# Patient Record
Sex: FEMALE | Race: WHITE | Employment: OTHER | URBAN - METROPOLITAN AREA
[De-identification: names, ages, dates, MRNs, and addresses within clinical notes are randomized per-mention and may not be internally consistent; named-entity substitution may affect disease eponyms.]

---

## 2017-01-25 ENCOUNTER — OFFICE VISIT (OUTPATIENT)
Dept: INTERNAL MEDICINE | Age: 67
End: 2017-01-25

## 2017-01-25 VITALS
SYSTOLIC BLOOD PRESSURE: 102 MMHG | HEART RATE: 64 BPM | TEMPERATURE: 98.1 F | HEIGHT: 65 IN | DIASTOLIC BLOOD PRESSURE: 62 MMHG | OXYGEN SATURATION: 97 % | BODY MASS INDEX: 22.99 KG/M2 | RESPIRATION RATE: 14 BRPM | WEIGHT: 138 LBS

## 2017-01-25 DIAGNOSIS — Z87.891 PERSONAL HISTORY OF TOBACCO USE: ICD-10-CM

## 2017-01-25 DIAGNOSIS — J44.9 CHRONIC OBSTRUCTIVE PULMONARY DISEASE, UNSPECIFIED COPD TYPE (HCC): ICD-10-CM

## 2017-01-25 DIAGNOSIS — E78.2 MIXED HYPERLIPIDEMIA: ICD-10-CM

## 2017-01-25 DIAGNOSIS — Z01.419 ENCOUNTER FOR GYNECOLOGICAL EXAMINATION WITHOUT ABNORMAL FINDING: ICD-10-CM

## 2017-01-25 DIAGNOSIS — M81.0 SENILE OSTEOPOROSIS: Primary | ICD-10-CM

## 2017-01-25 DIAGNOSIS — E03.4 HYPOTHYROIDISM DUE TO ACQUIRED ATROPHY OF THYROID: ICD-10-CM

## 2017-01-25 DIAGNOSIS — Z12.31 SCREENING MAMMOGRAM, ENCOUNTER FOR: ICD-10-CM

## 2017-01-25 PROCEDURE — 99215 OFFICE O/P EST HI 40 MIN: CPT | Performed by: FAMILY MEDICINE

## 2017-01-25 PROCEDURE — G0296 VISIT TO DETERM LDCT ELIG: HCPCS | Performed by: FAMILY MEDICINE

## 2017-01-25 RX ORDER — CITALOPRAM 40 MG/1
40 TABLET ORAL DAILY
Qty: 90 TABLET | Refills: 3 | Status: SHIPPED | OUTPATIENT
Start: 2017-01-25 | End: 2018-02-08 | Stop reason: SDUPTHER

## 2017-01-25 RX ORDER — LORATADINE 10 MG/1
10 TABLET ORAL DAILY
COMMUNITY
End: 2019-09-16 | Stop reason: ALTCHOICE

## 2017-01-25 ASSESSMENT — PATIENT HEALTH QUESTIONNAIRE - PHQ9
1. LITTLE INTEREST OR PLEASURE IN DOING THINGS: 0
2. FEELING DOWN, DEPRESSED OR HOPELESS: 0
SUM OF ALL RESPONSES TO PHQ9 QUESTIONS 1 & 2: 0
SUM OF ALL RESPONSES TO PHQ QUESTIONS 1-9: 0

## 2017-02-13 ENCOUNTER — HOSPITAL ENCOUNTER (OUTPATIENT)
Dept: WOMENS IMAGING | Age: 67
Discharge: HOME | End: 2017-02-13
Payer: MEDICARE

## 2017-02-13 DIAGNOSIS — M81.0 SENILE OSTEOPOROSIS: ICD-10-CM

## 2017-02-13 DIAGNOSIS — Z12.31 SCREENING MAMMOGRAM, ENCOUNTER FOR: ICD-10-CM

## 2017-02-13 PROCEDURE — 77080 DXA BONE DENSITY AXIAL: CPT

## 2017-02-13 PROCEDURE — G0202 SCR MAMMO BI INCL CAD: HCPCS

## 2017-02-13 RX ORDER — MOMETASONE FUROATE AND FORMOTEROL FUMARATE DIHYDRATE 200; 5 UG/1; UG/1
AEROSOL RESPIRATORY (INHALATION)
Qty: 13 G | Refills: 3 | Status: SHIPPED | OUTPATIENT
Start: 2017-02-13 | End: 2018-01-02 | Stop reason: SDUPTHER

## 2017-02-20 ENCOUNTER — TELEPHONE (OUTPATIENT)
Dept: INTERNAL MEDICINE | Age: 67
End: 2017-02-20

## 2017-02-20 RX ORDER — ALENDRONATE SODIUM 70 MG/1
70 TABLET ORAL
Qty: 12 TABLET | Refills: 3 | Status: SHIPPED | OUTPATIENT
Start: 2017-02-20 | End: 2017-10-17

## 2017-03-25 ENCOUNTER — HOSPITAL ENCOUNTER (EMERGENCY)
Age: 67
Discharge: HOME | End: 2017-03-25
Attending: EMERGENCY MEDICINE
Payer: MEDICARE

## 2017-03-25 ENCOUNTER — APPOINTMENT (OUTPATIENT)
Dept: CT IMAGING | Age: 67
End: 2017-03-25
Payer: MEDICARE

## 2017-03-25 VITALS
OXYGEN SATURATION: 100 % | DIASTOLIC BLOOD PRESSURE: 73 MMHG | WEIGHT: 136 LBS | BODY MASS INDEX: 22.66 KG/M2 | RESPIRATION RATE: 16 BRPM | HEIGHT: 65 IN | SYSTOLIC BLOOD PRESSURE: 133 MMHG | HEART RATE: 60 BPM | TEMPERATURE: 98.1 F

## 2017-03-25 DIAGNOSIS — K52.9 COLITIS: Primary | ICD-10-CM

## 2017-03-25 LAB
ALBUMIN SERPL-MCNC: 4.8 G/DL (ref 3.9–4.9)
ALP BLD-CCNC: 86 U/L (ref 40–130)
ALT SERPL-CCNC: 11 U/L (ref 0–33)
ANION GAP SERPL CALCULATED.3IONS-SCNC: 8 MEQ/L (ref 7–13)
AST SERPL-CCNC: 22 U/L (ref 0–35)
BASOPHILS ABSOLUTE: 0.1 K/UL (ref 0–0.2)
BASOPHILS RELATIVE PERCENT: 0.7 %
BILIRUB SERPL-MCNC: 0.4 MG/DL (ref 0–1.2)
BILIRUBIN URINE: NEGATIVE
BLOOD, URINE: NEGATIVE
BUN BLDV-MCNC: 13 MG/DL (ref 8–23)
CALCIUM SERPL-MCNC: 9.6 MG/DL (ref 8.6–10.2)
CHLORIDE BLD-SCNC: 99 MEQ/L (ref 98–107)
CLARITY: CLEAR
CO2: 25 MEQ/L (ref 22–29)
COLOR: YELLOW
CREAT SERPL-MCNC: 0.59 MG/DL (ref 0.5–0.9)
EOSINOPHILS ABSOLUTE: 0.1 K/UL (ref 0–0.7)
EOSINOPHILS RELATIVE PERCENT: 1.2 %
GFR AFRICAN AMERICAN: >60
GFR NON-AFRICAN AMERICAN: >60
GLOBULIN: 2 G/DL (ref 2.3–3.5)
GLUCOSE BLD-MCNC: 93 MG/DL (ref 74–109)
GLUCOSE URINE: NEGATIVE MG/DL
HCT VFR BLD CALC: 43.5 % (ref 37–47)
HEMOGLOBIN: 14.8 G/DL (ref 12–16)
KETONES, URINE: NEGATIVE MG/DL
LACTIC ACID: 0.9 MMOL/L (ref 0.5–2.2)
LEUKOCYTE ESTERASE, URINE: NEGATIVE
LYMPHOCYTES ABSOLUTE: 1.2 K/UL (ref 1–4.8)
LYMPHOCYTES RELATIVE PERCENT: 14.9 %
MCH RBC QN AUTO: 30.7 PG (ref 27–31.3)
MCHC RBC AUTO-ENTMCNC: 34.1 % (ref 33–37)
MCV RBC AUTO: 90.3 FL (ref 82–100)
MONOCYTES ABSOLUTE: 0.8 K/UL (ref 0.2–0.8)
MONOCYTES RELATIVE PERCENT: 10.1 %
NEUTROPHILS ABSOLUTE: 5.7 K/UL (ref 1.4–6.5)
NEUTROPHILS RELATIVE PERCENT: 73.1 %
NITRITE, URINE: NEGATIVE
PDW BLD-RTO: 13.6 % (ref 11.5–14.5)
PH UA: 6 (ref 5–9)
PLATELET # BLD: 159 K/UL (ref 130–400)
POTASSIUM SERPL-SCNC: 4 MEQ/L (ref 3.5–5.1)
PROTEIN UA: NEGATIVE MG/DL
RBC # BLD: 4.82 M/UL (ref 4.2–5.4)
SODIUM BLD-SCNC: 132 MEQ/L (ref 132–144)
SPECIFIC GRAVITY UA: 1 (ref 1–1.03)
TOTAL PROTEIN: 6.8 G/DL (ref 6.4–8.1)
UROBILINOGEN, URINE: 0.2 E.U./DL
WBC # BLD: 7.8 K/UL (ref 4.8–10.8)

## 2017-03-25 PROCEDURE — 80053 COMPREHEN METABOLIC PANEL: CPT

## 2017-03-25 PROCEDURE — 85025 COMPLETE CBC W/AUTO DIFF WBC: CPT

## 2017-03-25 PROCEDURE — 96374 THER/PROPH/DIAG INJ IV PUSH: CPT

## 2017-03-25 PROCEDURE — 81003 URINALYSIS AUTO W/O SCOPE: CPT

## 2017-03-25 PROCEDURE — 74177 CT ABD & PELVIS W/CONTRAST: CPT

## 2017-03-25 PROCEDURE — 2500000003 HC RX 250 WO HCPCS: Performed by: EMERGENCY MEDICINE

## 2017-03-25 PROCEDURE — 36415 COLL VENOUS BLD VENIPUNCTURE: CPT

## 2017-03-25 PROCEDURE — 96375 TX/PRO/DX INJ NEW DRUG ADDON: CPT

## 2017-03-25 PROCEDURE — 83605 ASSAY OF LACTIC ACID: CPT

## 2017-03-25 PROCEDURE — 99284 EMERGENCY DEPT VISIT MOD MDM: CPT

## 2017-03-25 PROCEDURE — 6360000004 HC RX CONTRAST MEDICATION: Performed by: RADIOLOGY

## 2017-03-25 PROCEDURE — 6360000002 HC RX W HCPCS: Performed by: EMERGENCY MEDICINE

## 2017-03-25 PROCEDURE — 2580000003 HC RX 258: Performed by: EMERGENCY MEDICINE

## 2017-03-25 RX ORDER — 0.9 % SODIUM CHLORIDE 0.9 %
1000 INTRAVENOUS SOLUTION INTRAVENOUS ONCE
Status: COMPLETED | OUTPATIENT
Start: 2017-03-25 | End: 2017-03-25

## 2017-03-25 RX ORDER — DILTIAZEM HYDROCHLORIDE 60 MG/1
60 TABLET, FILM COATED ORAL 2 TIMES DAILY
COMMUNITY
End: 2019-05-13 | Stop reason: SDUPTHER

## 2017-03-25 RX ORDER — CIPROFLOXACIN 500 MG/1
500 TABLET, FILM COATED ORAL 2 TIMES DAILY
Qty: 20 TABLET | Refills: 0 | Status: SHIPPED | OUTPATIENT
Start: 2017-03-25 | End: 2017-04-04

## 2017-03-25 RX ORDER — ONDANSETRON 4 MG/1
4-8 TABLET, ORALLY DISINTEGRATING ORAL EVERY 12 HOURS PRN
Qty: 12 TABLET | Refills: 0 | Status: SHIPPED | OUTPATIENT
Start: 2017-03-25 | End: 2017-10-17

## 2017-03-25 RX ORDER — HYDROCODONE BITARTRATE AND ACETAMINOPHEN 5; 325 MG/1; MG/1
1 TABLET ORAL EVERY 6 HOURS PRN
Qty: 10 TABLET | Refills: 0 | Status: SHIPPED | OUTPATIENT
Start: 2017-03-25 | End: 2017-04-01

## 2017-03-25 RX ORDER — DICYCLOMINE HYDROCHLORIDE 10 MG/1
10 CAPSULE ORAL
Qty: 30 CAPSULE | Refills: 0 | Status: SHIPPED | OUTPATIENT
Start: 2017-03-25 | End: 2018-01-23 | Stop reason: ALTCHOICE

## 2017-03-25 RX ORDER — ONDANSETRON 2 MG/ML
4 INJECTION INTRAMUSCULAR; INTRAVENOUS EVERY 30 MIN PRN
Status: DISCONTINUED | OUTPATIENT
Start: 2017-03-25 | End: 2017-03-25 | Stop reason: HOSPADM

## 2017-03-25 RX ORDER — FENTANYL CITRATE 50 UG/ML
50 INJECTION, SOLUTION INTRAMUSCULAR; INTRAVENOUS
Status: DISCONTINUED | OUTPATIENT
Start: 2017-03-25 | End: 2017-03-25 | Stop reason: HOSPADM

## 2017-03-25 RX ORDER — METRONIDAZOLE 500 MG/1
500 TABLET ORAL 3 TIMES DAILY
Qty: 30 TABLET | Refills: 0 | Status: SHIPPED | OUTPATIENT
Start: 2017-03-25 | End: 2017-04-04

## 2017-03-25 RX ADMIN — IOPAMIDOL 100 ML: 755 INJECTION, SOLUTION INTRAVENOUS at 17:53

## 2017-03-25 RX ADMIN — FENTANYL CITRATE 50 MCG: 50 INJECTION, SOLUTION INTRAMUSCULAR; INTRAVENOUS at 16:37

## 2017-03-25 RX ADMIN — SODIUM CHLORIDE 1000 ML: 9 INJECTION, SOLUTION INTRAVENOUS at 16:29

## 2017-03-25 RX ADMIN — ONDANSETRON 4 MG: 2 INJECTION, SOLUTION INTRAMUSCULAR; INTRAVENOUS at 16:38

## 2017-03-25 RX ADMIN — BARIUM SULFATE 450 ML: 21 SUSPENSION ORAL at 16:30

## 2017-03-25 ASSESSMENT — ENCOUNTER SYMPTOMS
NAUSEA: 1
FACIAL SWELLING: 0
COUGH: 0
DIARRHEA: 1
ABDOMINAL PAIN: 1
SHORTNESS OF BREATH: 0
BLOOD IN STOOL: 1
BACK PAIN: 0

## 2017-03-25 ASSESSMENT — PAIN SCALES - GENERAL
PAINLEVEL_OUTOF10: 0
PAINLEVEL_OUTOF10: 0
PAINLEVEL_OUTOF10: 6
PAINLEVEL_OUTOF10: 5

## 2017-03-25 ASSESSMENT — PAIN DESCRIPTION - INTENSITY
RATING_2: 10
RATING_2: 0

## 2017-03-25 ASSESSMENT — PAIN DESCRIPTION - LOCATION
LOCATION_2: ABDOMEN
LOCATION: RECTUM

## 2017-03-25 ASSESSMENT — PAIN DESCRIPTION - FREQUENCY: FREQUENCY: INTERMITTENT

## 2017-03-25 ASSESSMENT — PAIN DESCRIPTION - DESCRIPTORS
DESCRIPTORS: DISCOMFORT
DESCRIPTORS_2: CRAMPING

## 2017-03-25 ASSESSMENT — PAIN DESCRIPTION - DURATION: DURATION_2: INTERMITTENT

## 2017-03-26 ENCOUNTER — TELEPHONE (OUTPATIENT)
Dept: INTERNAL MEDICINE | Age: 67
End: 2017-03-26

## 2017-04-15 RX ORDER — ALPRAZOLAM 0.5 MG/1
TABLET ORAL
Qty: 90 TABLET | Refills: 1 | Status: SHIPPED | OUTPATIENT
Start: 2017-04-15 | End: 2017-10-17 | Stop reason: SDUPTHER

## 2017-07-03 ENCOUNTER — TELEPHONE (OUTPATIENT)
Dept: INTERNAL MEDICINE | Age: 67
End: 2017-07-03

## 2017-07-10 ENCOUNTER — TELEPHONE (OUTPATIENT)
Dept: CASE MANAGEMENT | Age: 67
End: 2017-07-10

## 2017-07-12 ENCOUNTER — TELEPHONE (OUTPATIENT)
Dept: CASE MANAGEMENT | Age: 67
End: 2017-07-12

## 2017-07-18 ENCOUNTER — HOSPITAL ENCOUNTER (OUTPATIENT)
Dept: CT IMAGING | Age: 67
Discharge: HOME | End: 2017-07-18
Payer: MEDICARE

## 2017-07-18 VITALS
BODY MASS INDEX: 21.66 KG/M2 | HEART RATE: 63 BPM | HEIGHT: 65 IN | WEIGHT: 130 LBS | RESPIRATION RATE: 16 BRPM | SYSTOLIC BLOOD PRESSURE: 154 MMHG | DIASTOLIC BLOOD PRESSURE: 84 MMHG

## 2017-07-18 DIAGNOSIS — Z87.891 PERSONAL HISTORY OF TOBACCO USE: ICD-10-CM

## 2017-07-18 PROCEDURE — G0297 LDCT FOR LUNG CA SCREEN: HCPCS

## 2017-10-12 RX ORDER — ALPRAZOLAM 0.5 MG/1
TABLET ORAL
Qty: 90 TABLET | Refills: 0 | OUTPATIENT
Start: 2017-10-12

## 2017-10-17 ENCOUNTER — OFFICE VISIT (OUTPATIENT)
Dept: INTERNAL MEDICINE | Age: 67
End: 2017-10-17

## 2017-10-17 VITALS
DIASTOLIC BLOOD PRESSURE: 80 MMHG | HEART RATE: 60 BPM | HEIGHT: 65 IN | RESPIRATION RATE: 16 BRPM | BODY MASS INDEX: 21.79 KG/M2 | OXYGEN SATURATION: 97 % | TEMPERATURE: 98 F | SYSTOLIC BLOOD PRESSURE: 110 MMHG | WEIGHT: 130.8 LBS

## 2017-10-17 DIAGNOSIS — F41.1 GENERALIZED ANXIETY DISORDER: Primary | ICD-10-CM

## 2017-10-17 DIAGNOSIS — G47.00 INSOMNIA, UNSPECIFIED TYPE: ICD-10-CM

## 2017-10-17 PROCEDURE — 99213 OFFICE O/P EST LOW 20 MIN: CPT | Performed by: NURSE PRACTITIONER

## 2017-10-17 RX ORDER — RIBOFLAVIN (VITAMIN B2) 100 MG
2000 TABLET ORAL
COMMUNITY
End: 2018-05-07 | Stop reason: ALTCHOICE

## 2017-10-17 RX ORDER — ALPRAZOLAM 0.5 MG/1
TABLET ORAL
Qty: 30 TABLET | Refills: 0 | Status: SHIPPED | OUTPATIENT
Start: 2017-10-17 | End: 2017-11-20 | Stop reason: SDUPTHER

## 2017-10-18 LAB
AMPHETAMINE SCREEN, URINE: NORMAL
BARBITURATE SCREEN URINE: NORMAL
BENZODIAZEPINE SCREEN, URINE: NORMAL
CANNABINOID SCREEN URINE: NORMAL
COCAINE METABOLITE SCREEN URINE: NORMAL
Lab: NORMAL
OPIATE SCREEN URINE: NORMAL
PHENCYCLIDINE SCREEN URINE: NORMAL

## 2017-10-19 PROBLEM — Z72.0 TOBACCO ABUSE: Status: ACTIVE | Noted: 2017-06-30

## 2017-10-19 PROBLEM — I20.9 ANGINA PECTORIS (HCC): Status: ACTIVE | Noted: 2017-08-24

## 2017-10-19 NOTE — PROGRESS NOTES
effects, adverse effects of the medication prescribed today, as well as treatment plan/ rationale and result expectations have been discussed with the patient who expresses understanding and desires to proceed. Close follow up to evaluate treatment results and for coordination of care. I have reviewed the patient's medical history in detail and updated the computerized patient record.     Rocio Liu NP

## 2017-10-23 DIAGNOSIS — Z79.899 CHRONICALLY ON BENZODIAZEPINE THERAPY: Primary | ICD-10-CM

## 2017-10-24 ENCOUNTER — NURSE ONLY (OUTPATIENT)
Dept: INTERNAL MEDICINE | Age: 67
End: 2017-10-24

## 2017-10-24 DIAGNOSIS — Z79.899 CHRONICALLY ON BENZODIAZEPINE THERAPY: ICD-10-CM

## 2017-10-27 LAB
6-ACETYLMORPHINE: NOT DETECTED
7-AMINOCLONAZEPAM: NOT DETECTED
ALPHA-OH-ALPRAZOLAM: NOT DETECTED
ALPRAZOLAM: NOT DETECTED
AMPHETAMINE: NOT DETECTED
BARBITURATES: NOT DETECTED
BENZOYLECGONINE: NOT DETECTED
BUPRENORPHINE: NOT DETECTED
CARISOPRODOL: NOT DETECTED
CLONAZEPAM: NOT DETECTED
CODEINE: NOT DETECTED
CREATININE URINE: <20 MG/DL (ref 20–400)
DIAZEPAM: NOT DETECTED
EER PAIN MGT DRUG PANEL, HIGH RES/EMIT U: ABNORMAL
ETHYL GLUCURONIDE: PRESENT
FENTANYL: NOT DETECTED
HYDROCODONE: NOT DETECTED
HYDROMORPHONE: NOT DETECTED
LORAZEPAM: NOT DETECTED
MARIJUANA METABOLITE: NOT DETECTED
MDA: NOT DETECTED
MDEA: NOT DETECTED
MDMA URINE: NOT DETECTED
MEPERIDINE: NOT DETECTED
METHADONE: NOT DETECTED
METHAMPHETAMINE: NOT DETECTED
METHYLPHENIDATE: NOT DETECTED
MIDAZOLAM: NOT DETECTED
MORPHINE: NOT DETECTED
NORBUPRENORPHINE, FREE: NOT DETECTED
NORDIAZEPAM: NOT DETECTED
NORFENTANYL: NOT DETECTED
NORHYDROCODONE, URINE: NOT DETECTED
NOROXYCODONE: NOT DETECTED
NOROXYMORPHONE, URINE: NOT DETECTED
OXAZEPAM: NOT DETECTED
OXYCODONE: NOT DETECTED
OXYMORPHONE: NOT DETECTED
PAIN MANAGEMENT DRUG PANEL: ABNORMAL
PCP: NOT DETECTED
PHENTERMINE: NOT DETECTED
PROPOXYPHENE: NOT DETECTED
TAPENTADOL, URINE: NOT DETECTED
TAPENTADOL-O-SULFATE, URINE: NOT DETECTED
TEMAZEPAM: NOT DETECTED
TRAMADOL: NOT DETECTED
ZOLPIDEM: NOT DETECTED

## 2017-11-20 DIAGNOSIS — G47.00 INSOMNIA, UNSPECIFIED TYPE: ICD-10-CM

## 2017-11-20 DIAGNOSIS — F41.1 GENERALIZED ANXIETY DISORDER: ICD-10-CM

## 2017-11-20 RX ORDER — ALPRAZOLAM 0.5 MG/1
TABLET ORAL
Qty: 30 TABLET | Refills: 2 | Status: SHIPPED | OUTPATIENT
Start: 2017-11-20 | End: 2018-02-12 | Stop reason: SDUPTHER

## 2018-01-23 ENCOUNTER — OFFICE VISIT (OUTPATIENT)
Dept: INTERNAL MEDICINE CLINIC | Age: 68
End: 2018-01-23
Payer: MEDICARE

## 2018-01-23 VITALS
TEMPERATURE: 97.9 F | DIASTOLIC BLOOD PRESSURE: 80 MMHG | SYSTOLIC BLOOD PRESSURE: 110 MMHG | RESPIRATION RATE: 16 BRPM | OXYGEN SATURATION: 98 % | BODY MASS INDEX: 22.66 KG/M2 | WEIGHT: 136 LBS | HEART RATE: 61 BPM | HEIGHT: 65 IN

## 2018-01-23 DIAGNOSIS — J44.9 CHRONIC OBSTRUCTIVE PULMONARY DISEASE, UNSPECIFIED COPD TYPE (HCC): ICD-10-CM

## 2018-01-23 DIAGNOSIS — F41.1 GENERALIZED ANXIETY DISORDER: ICD-10-CM

## 2018-01-23 DIAGNOSIS — E78.2 MIXED HYPERLIPIDEMIA: Primary | ICD-10-CM

## 2018-01-23 DIAGNOSIS — Z12.31 SCREENING MAMMOGRAM, ENCOUNTER FOR: ICD-10-CM

## 2018-01-23 DIAGNOSIS — E03.4 HYPOTHYROIDISM DUE TO ACQUIRED ATROPHY OF THYROID: ICD-10-CM

## 2018-01-23 PROBLEM — I20.9 ANGINA PECTORIS (HCC): Status: RESOLVED | Noted: 2017-08-24 | Resolved: 2018-01-23

## 2018-01-23 PROCEDURE — 99214 OFFICE O/P EST MOD 30 MIN: CPT | Performed by: FAMILY MEDICINE

## 2018-01-23 NOTE — PROGRESS NOTES
Patient: Rayne Suarez    YOB: 1950    Date: 1/23/18    Chief Complaint   Patient presents with    Hyperlipidemia     She is here today for one year follow up. She is due for a mammogram.        Patient Active Problem List    Diagnosis Date Noted    Generalized anxiety disorder 10/17/2017    Tobacco abuse 06/30/2017    Mixed hyperlipidemia 01/14/2015    Hypothyroidism 01/14/2015    GERD (gastroesophageal reflux disease) 01/15/2013    COPD (chronic obstructive pulmonary disease) ([facility] [address] 01/09/2012    Goiter        Allergies   Allergen Reactions    Morphine     Cefdinir Itching and Other (See Comments)    Fosamax [Alendronate] Other (See Comments)     Muscle aches      Pantoprazole Other (See Comments)     abdominal cramping, uncontrolled BM.  Simvastatin      muscle soreness.  Zetia [Ezetimibe]     Zocor [Simvastatin - High Dose]     Adhesive Tape Rash       Vitals:    01/23/18 0932   BP: 110/80   Site: Right Arm   Position: Sitting   Cuff Size: Small Adult   Pulse: 61   Resp: 16   Temp: 97.9 °F (36.6 °C)   TempSrc: Oral   SpO2: 98%   Weight: 136 lb (61.7 kg)   Height: 5' 5\" (1.651 m)      Body mass index is 22.63 kg/m². HPI    She comes in to follow-up on her lipids. She is on a new medication per the [facility] and they've requested that we check her lab work on. She lost her father to COPD last year that motivated her to stop smoking. She is trying to deal with that and it's been a bit of a challenge as it makes her anxiety so much worse. She has no other focal complaints were reviewed all of her needs her maintenance issues and other different things [facility] is taking care of they will follow her thyroid. She was unable to take the Fosamax I put her on for bone density as it made her muscles very achy.   I'm hesitant then to give her re-class because of that and I think she should take this up with her endocrinologist at the [facility] to see if they would prefer a different medication. Review of Systems    Constitutional: Negative for fatigue, fever and sweats. HEENT: Negative for eye discharge and vision loss. Negative for ear drainage, hearing loss and nasal drainage. Respiratory: Negative for cough, dyspnea and wheezing. Cardiovascular:  Negative for chest pain, claudication and irregular heartbeat/palpitations. Gastrointestinal: Negative for abdominal pain, nausea, constipation and diarrhea. Genitourinary: Negative for dysuria, patient post menopausal.  Metabolic/Endocrine: Negative for cold intolerance, heat intolerance, polydipsia and polyphagia. No unintended weight loss or weight gain. Neuro/Psychiatric: Negative for gait disturbance. Negative for psychiatric symptoms. Dermatologic: Negative for pruritus and rash. Musculoskeletal: positive for bone/joint symptoms. No numbness or tingling. No loss of function. Hematology: Negative for bleeding and easy bruising. Immunology:  Negative for environmental allergies and food allergies. Physical Exam    Patient's medication, allergies, past medical, surgical, social and family histories were reviewed and updated as appropriate. PHYSICAL EXAM   General Appearance:  Alert oriented pleasant cooperative in no acute distress. HEENT: Eyes clear nonicteric facial muscles symmetrical.  Hearing good, ears some wax bilaterally but the TMs are normal oropharynx clear  Neck: Soft nontender no adenopathy no carotid bruits  Lungs: Clear to auscultation no wheezes rhonchi or rales  Heart: Regular rate and rhythm without murmurs rubs or gallops  Extremities: No rashes or edema. Neurologically intact. Assessment:  1. Mixed hyperlipidemia  Comprehensive Metabolic Panel    Lipid Panel   2. Chronic obstructive pulmonary disease, unspecified COPD type ([address]  Stable slightly improved with her having stop smoking   3. Hypothyroidism due to acquired atrophy of thyroid  Per the [facility] [email or web address], [facility]   4.

## 2018-01-29 ENCOUNTER — TELEPHONE (OUTPATIENT)
Dept: INTERNAL MEDICINE CLINIC | Age: 68
End: 2018-01-29

## 2018-01-29 NOTE — TELEPHONE ENCOUNTER
The liver and kidneys are functioning well and no evidence of diabetes and the electrolytes are normal.  Her chol is slightly high. The total cholesterol is 205 , the good HDL cholesterol is 77 and the bad LDL cholesterol is 112. With the high HDL, it mitigates the slightly elevated LDL.

## 2018-02-12 ENCOUNTER — TELEPHONE (OUTPATIENT)
Dept: INTERNAL MEDICINE CLINIC | Age: 68
End: 2018-02-12

## 2018-02-12 DIAGNOSIS — Z11.59 NEED FOR HEPATITIS C SCREENING TEST: Primary | ICD-10-CM

## 2018-02-12 DIAGNOSIS — G47.00 INSOMNIA, UNSPECIFIED TYPE: ICD-10-CM

## 2018-02-12 DIAGNOSIS — F41.1 GENERALIZED ANXIETY DISORDER: ICD-10-CM

## 2018-02-12 RX ORDER — ALPRAZOLAM 0.5 MG/1
TABLET ORAL
Qty: 30 TABLET | Refills: 2 | Status: SHIPPED | OUTPATIENT
Start: 2018-02-12 | End: 2018-05-15 | Stop reason: SDUPTHER

## 2018-02-15 ENCOUNTER — TELEPHONE (OUTPATIENT)
Dept: OTHER | Facility: CLINIC | Age: 68
End: 2018-02-15

## 2018-02-15 ENCOUNTER — OFFICE VISIT (OUTPATIENT)
Dept: INTERNAL MEDICINE CLINIC | Age: 68
End: 2018-02-15
Payer: MEDICARE

## 2018-02-15 VITALS
HEART RATE: 63 BPM | WEIGHT: 133.2 LBS | DIASTOLIC BLOOD PRESSURE: 76 MMHG | TEMPERATURE: 97.9 F | RESPIRATION RATE: 16 BRPM | SYSTOLIC BLOOD PRESSURE: 124 MMHG | BODY MASS INDEX: 22.19 KG/M2 | OXYGEN SATURATION: 93 % | HEIGHT: 65 IN

## 2018-02-15 DIAGNOSIS — K52.9 GASTROENTERITIS: ICD-10-CM

## 2018-02-15 DIAGNOSIS — J20.9 ACUTE BRONCHITIS, UNSPECIFIED ORGANISM: Primary | ICD-10-CM

## 2018-02-15 PROCEDURE — 99213 OFFICE O/P EST LOW 20 MIN: CPT | Performed by: FAMILY MEDICINE

## 2018-02-15 RX ORDER — SULFAMETHOXAZOLE AND TRIMETHOPRIM 800; 160 MG/1; MG/1
1 TABLET ORAL 2 TIMES DAILY
Qty: 20 TABLET | Refills: 0 | Status: SHIPPED | OUTPATIENT
Start: 2018-02-15 | End: 2018-02-25

## 2018-02-15 RX ORDER — PREDNISONE 1 MG/1
TABLET ORAL
Qty: 30 TABLET | Refills: 0 | Status: SHIPPED | OUTPATIENT
Start: 2018-02-15 | End: 2018-04-28 | Stop reason: ALTCHOICE

## 2018-02-15 ASSESSMENT — PATIENT HEALTH QUESTIONNAIRE - PHQ9
2. FEELING DOWN, DEPRESSED OR HOPELESS: 0
SUM OF ALL RESPONSES TO PHQ9 QUESTIONS 1 & 2: 0
1. LITTLE INTEREST OR PLEASURE IN DOING THINGS: 0
SUM OF ALL RESPONSES TO PHQ QUESTIONS 1-9: 0

## 2018-02-15 NOTE — PROGRESS NOTES
Negative for chest pain, claudication and irregular heartbeat/palpitations. Gastrointestinal: Negative for nausea, constipation. Positive for abdominal pain and diarrhea. Genitourinary: Negative for dysuria, hematuria, polyuria, dysmenorrhea, menorrhagia and vaginal discharge. Metabolic/Endocrine: Negative for cold intolerance, heat intolerance, polydipsia and polyphagia. No unintended weight loss or weight gain. Neuro/Psychiatric: Negative for gait disturbance. Negative for psychiatric symptoms. Dermatologic: Negative for pruritus and rash. Musculoskeletal: Negative for bone/joint symptoms. No numbness or tingling. No loss of function. Hematology: Positive for bleeding and easy bruising. Immunology:  Negative for environmental allergies and food allergies. Physical Exam    Patient's medication, allergies, past medical, surgical, social and family histories were reviewed and updated as appropriate. PHYSICAL EXAM   General Appearance: Alert oriented pleasant cooperative in no acute distress. HEENT: Eyes clear nonicteric oropharynx minimally hyperemic ears TMs intact no erythema facial muscles symmetrical pain to palpation of sinuses  Neck: Soft nontender no adenopathy  Lungs: Rhonchi throughout with occasional wheeze no rales noted decreased breath sounds  Heart: Regular rate and rhythm without murmurs rubs or gallops. Assessment:  1. Acute bronchitis, unspecified organism  predniSONE (DELTASONE) 5 MG tablet    sulfamethoxazole-trimethoprim (BACTRIM DS) 800-160 MG per tablet  She has into R inhaler at home and I wonder use 1 inhalation twice a day she's been directed she's supposed to stay on that. She also has a Proventil inhaler at home which is can use 2 puffs inhaled 3 times a day for the next 5 days. Then she'll use it as needed for cough and congestion.    2. Gastroenteritis  Increase her fluids Maalox or Mylanta for gas               Plan:  Current Outpatient Prescriptions   Medication

## 2018-02-15 NOTE — TELEPHONE ENCOUNTER
[phone] (home)       Georgiaraza Chandler ARTHUR to set her up for a [employer] account. Left message for pt to return call.         Flint Sandrine      Patient Care Team:  Tawana Vo MD as PCP - General (Family Medicine)

## 2018-02-16 ENCOUNTER — HOSPITAL ENCOUNTER (OUTPATIENT)
Dept: WOMENS IMAGING | Age: 68
Discharge: HOME | End: 2018-02-18
Payer: MEDICARE

## 2018-02-16 DIAGNOSIS — Z12.31 SCREENING MAMMOGRAM, ENCOUNTER FOR: ICD-10-CM

## 2018-02-16 PROCEDURE — 77063 BREAST TOMOSYNTHESIS BI: CPT

## 2018-02-22 ENCOUNTER — OFFICE VISIT (OUTPATIENT)
Dept: INTERNAL MEDICINE CLINIC | Age: 68
End: 2018-02-22
Payer: MEDICARE

## 2018-02-22 VITALS
TEMPERATURE: 98.1 F | SYSTOLIC BLOOD PRESSURE: 100 MMHG | WEIGHT: 132.6 LBS | HEART RATE: 59 BPM | RESPIRATION RATE: 16 BRPM | BODY MASS INDEX: 22.09 KG/M2 | OXYGEN SATURATION: 96 % | DIASTOLIC BLOOD PRESSURE: 60 MMHG | HEIGHT: 65 IN

## 2018-02-22 DIAGNOSIS — J44.1 COPD EXACERBATION (HCC): ICD-10-CM

## 2018-02-22 PROCEDURE — 99213 OFFICE O/P EST LOW 20 MIN: CPT | Performed by: FAMILY MEDICINE

## 2018-02-22 RX ORDER — ALBUTEROL SULFATE 90 UG/1
AEROSOL, METERED RESPIRATORY (INHALATION)
Qty: 8.5 INHALER | Refills: 0
Start: 2018-02-22 | End: 2018-10-12 | Stop reason: SDUPTHER

## 2018-02-22 NOTE — PROGRESS NOTES
mg by mouth daily      Ascorbic Acid (VITAMIN C) 100 MG tablet Take 2,000 mg by mouth       pitavastatin (LIVALO) 1 MG TABS tablet Take 1,000 mg by mouth daily      diltiazem (CARDIZEM) 60 MG tablet Take 60 mg by mouth daily      loratadine (CLARITIN) 10 MG tablet Take 10 mg by mouth daily      meclofenamate (MECLOMEN) 100 MG capsule TAKE 1 CAPSULE BY MOUTH DAILY AS NEEDED 90 capsule 3    conjugated estrogens (PREMARIN) 0.625 MG/GM vaginal cream Place vaginally daily. 1 Tube 3    levothyroxine (SYNTHROID) 50 MCG tablet Take 50 mcg by mouth daily   3    vitamin D3 (CHOLECALCIFEROL) 1000 UNITS TABS tablet Take 2 tablets by mouth daily        No current facility-administered medications for this visit. No orders of the defined types were placed in this encounter. Orders Placed This Encounter   Medications    mometasone-formoterol (DULERA) 200-5 MCG/ACT inhaler     Sig: Inhale 1 puff into the lungs 2 times daily INHALE 1 PUFF EVERY 12 HOURS AS DIRECTED     Dispense:  13 g     Refill:  3    albuterol sulfate HFA (PROAIR HFA) 108 (90 Base) MCG/ACT inhaler     Sig: INHALE 2 PUFFS INTO THE LUNGS EVERY 6 HOURS AS NEEDED FOR WHEEZING. Dispense:  8.5 Inhaler     Refill:  0             Return if symptoms worsen or fail to improve.     Dr. Allyson Valentin      2/22/18  1:39 PM

## 2018-03-01 ENCOUNTER — NURSE ONLY (OUTPATIENT)
Dept: INTERNAL MEDICINE CLINIC | Age: 68
End: 2018-03-01
Payer: MEDICARE

## 2018-03-01 DIAGNOSIS — Z11.59 NEED FOR HEPATITIS C SCREENING TEST: Primary | ICD-10-CM

## 2018-03-01 LAB — HEPATITIS C ANTIBODY INTERPRETATION: NORMAL

## 2018-03-01 PROCEDURE — 36415 COLL VENOUS BLD VENIPUNCTURE: CPT | Performed by: FAMILY MEDICINE

## 2018-04-16 ENCOUNTER — OFFICE VISIT (OUTPATIENT)
Dept: INTERNAL MEDICINE CLINIC | Age: 68
End: 2018-04-16
Payer: MEDICARE

## 2018-04-16 VITALS
BODY MASS INDEX: 22.49 KG/M2 | HEIGHT: 65 IN | WEIGHT: 135 LBS | SYSTOLIC BLOOD PRESSURE: 126 MMHG | TEMPERATURE: 97.9 F | RESPIRATION RATE: 16 BRPM | OXYGEN SATURATION: 95 % | HEART RATE: 74 BPM | DIASTOLIC BLOOD PRESSURE: 74 MMHG

## 2018-04-16 DIAGNOSIS — J01.01 ACUTE RECURRENT MAXILLARY SINUSITIS: Primary | ICD-10-CM

## 2018-04-16 DIAGNOSIS — F17.200 NICOTINE USE DISORDER: ICD-10-CM

## 2018-04-16 PROCEDURE — 99213 OFFICE O/P EST LOW 20 MIN: CPT | Performed by: PHYSICIAN ASSISTANT

## 2018-04-16 RX ORDER — CIPROFLOXACIN 500 MG/1
500 TABLET, FILM COATED ORAL 2 TIMES DAILY
Qty: 20 TABLET | Refills: 0 | Status: SHIPPED | OUTPATIENT
Start: 2018-04-16 | End: 2018-04-26

## 2018-04-16 RX ORDER — FLUTICASONE PROPIONATE 50 MCG
1 SPRAY, SUSPENSION (ML) NASAL DAILY
Qty: 1 BOTTLE | Refills: 3 | Status: SHIPPED | OUTPATIENT
Start: 2018-04-16 | End: 2018-05-18

## 2018-04-16 ASSESSMENT — ENCOUNTER SYMPTOMS
SORE THROAT: 1
SHORTNESS OF BREATH: 1
DIARRHEA: 0
SINUS PAIN: 1
COUGH: 1
SPUTUM PRODUCTION: 1
EYE PAIN: 0
WHEEZING: 1

## 2018-04-28 ENCOUNTER — OFFICE VISIT (OUTPATIENT)
Dept: FAMILY MEDICINE CLINIC | Age: 68
End: 2018-04-28
Payer: MEDICARE

## 2018-04-28 VITALS
HEART RATE: 80 BPM | HEIGHT: 65 IN | OXYGEN SATURATION: 95 % | DIASTOLIC BLOOD PRESSURE: 70 MMHG | BODY MASS INDEX: 22.66 KG/M2 | SYSTOLIC BLOOD PRESSURE: 124 MMHG | WEIGHT: 136 LBS | TEMPERATURE: 97.9 F

## 2018-04-28 DIAGNOSIS — R09.81 SINUS CONGESTION: ICD-10-CM

## 2018-04-28 DIAGNOSIS — J40 BRONCHITIS: Primary | ICD-10-CM

## 2018-04-28 DIAGNOSIS — J44.9 CHRONIC OBSTRUCTIVE PULMONARY DISEASE, UNSPECIFIED COPD TYPE (HCC): ICD-10-CM

## 2018-04-28 PROCEDURE — 99213 OFFICE O/P EST LOW 20 MIN: CPT | Performed by: NURSE PRACTITIONER

## 2018-04-28 RX ORDER — BENZONATATE 100 MG/1
100 CAPSULE ORAL 3 TIMES DAILY PRN
Qty: 42 CAPSULE | Refills: 0 | Status: SHIPPED | OUTPATIENT
Start: 2018-04-28 | End: 2018-05-07 | Stop reason: ALTCHOICE

## 2018-04-28 RX ORDER — DOXYCYCLINE HYCLATE 100 MG
100 TABLET ORAL 2 TIMES DAILY
Qty: 20 TABLET | Refills: 0 | Status: SHIPPED | OUTPATIENT
Start: 2018-04-28 | End: 2018-05-08

## 2018-04-28 RX ORDER — PREDNISONE 1 MG/1
TABLET ORAL
Qty: 30 TABLET | Refills: 0 | Status: SHIPPED | OUTPATIENT
Start: 2018-04-28 | End: 2018-05-18

## 2018-04-28 ASSESSMENT — ENCOUNTER SYMPTOMS
SINUS PAIN: 1
SINUS PRESSURE: 1
ABDOMINAL DISTENTION: 0
CONSTIPATION: 0
ABDOMINAL PAIN: 0
VOMITING: 0
SHORTNESS OF BREATH: 1
HEMOPTYSIS: 0
WHEEZING: 1
DIARRHEA: 0
COUGH: 1
TROUBLE SWALLOWING: 0
CHEST TIGHTNESS: 1
HEARTBURN: 0
NAUSEA: 0
RHINORRHEA: 0
SORE THROAT: 1

## 2018-05-01 ENCOUNTER — OFFICE VISIT (OUTPATIENT)
Dept: INTERNAL MEDICINE CLINIC | Age: 68
End: 2018-05-01
Payer: MEDICARE

## 2018-05-01 VITALS
RESPIRATION RATE: 16 BRPM | HEIGHT: 65 IN | WEIGHT: 131.4 LBS | OXYGEN SATURATION: 98 % | HEART RATE: 86 BPM | DIASTOLIC BLOOD PRESSURE: 72 MMHG | SYSTOLIC BLOOD PRESSURE: 126 MMHG | BODY MASS INDEX: 21.89 KG/M2 | TEMPERATURE: 98.1 F

## 2018-05-01 DIAGNOSIS — J06.9 URI, ACUTE: Primary | ICD-10-CM

## 2018-05-01 DIAGNOSIS — J41.0 SIMPLE CHRONIC BRONCHITIS (HCC): ICD-10-CM

## 2018-05-01 PROCEDURE — 99213 OFFICE O/P EST LOW 20 MIN: CPT | Performed by: PHYSICIAN ASSISTANT

## 2018-05-01 ASSESSMENT — ENCOUNTER SYMPTOMS
BLURRED VISION: 0
WHEEZING: 1
VOMITING: 0
SHORTNESS OF BREATH: 1
COUGH: 1
SORE THROAT: 1
NAUSEA: 0
EYE PAIN: 0
CONSTIPATION: 0
DIARRHEA: 0

## 2018-05-07 ENCOUNTER — OFFICE VISIT (OUTPATIENT)
Dept: INTERNAL MEDICINE CLINIC | Age: 68
End: 2018-05-07
Payer: MEDICARE

## 2018-05-07 ENCOUNTER — HOSPITAL ENCOUNTER (OUTPATIENT)
Dept: GENERAL RADIOLOGY | Age: 68
Discharge: HOME | End: 2018-05-09
Payer: MEDICARE

## 2018-05-07 VITALS
WEIGHT: 132.4 LBS | HEIGHT: 65 IN | OXYGEN SATURATION: 94 % | RESPIRATION RATE: 20 BRPM | DIASTOLIC BLOOD PRESSURE: 74 MMHG | HEART RATE: 66 BPM | BODY MASS INDEX: 22.06 KG/M2 | SYSTOLIC BLOOD PRESSURE: 122 MMHG | TEMPERATURE: 97.4 F

## 2018-05-07 DIAGNOSIS — R06.02 SOB (SHORTNESS OF BREATH): ICD-10-CM

## 2018-05-07 DIAGNOSIS — J42 CHRONIC BRONCHITIS, UNSPECIFIED CHRONIC BRONCHITIS TYPE (HCC): ICD-10-CM

## 2018-05-07 DIAGNOSIS — R05.3 PERSISTENT COUGH FOR 3 WEEKS OR LONGER: ICD-10-CM

## 2018-05-07 DIAGNOSIS — R23.3 ABNORMAL BRUISING: ICD-10-CM

## 2018-05-07 DIAGNOSIS — R10.31 RIGHT LOWER QUADRANT ABDOMINAL PAIN: Primary | ICD-10-CM

## 2018-05-07 PROCEDURE — 71046 X-RAY EXAM CHEST 2 VIEWS: CPT

## 2018-05-07 PROCEDURE — 99213 OFFICE O/P EST LOW 20 MIN: CPT | Performed by: PHYSICIAN ASSISTANT

## 2018-05-07 PROCEDURE — 94640 AIRWAY INHALATION TREATMENT: CPT | Performed by: PHYSICIAN ASSISTANT

## 2018-05-07 RX ORDER — ALBUTEROL SULFATE 2.5 MG/3ML
2.5 SOLUTION RESPIRATORY (INHALATION) EVERY 6 HOURS PRN
Qty: 120 VIAL | Refills: 6 | Status: SHIPPED | OUTPATIENT
Start: 2018-05-07 | End: 2018-05-08 | Stop reason: SDUPTHER

## 2018-05-07 RX ORDER — ALBUTEROL SULFATE 2.5 MG/3ML
2.5 SOLUTION RESPIRATORY (INHALATION) ONCE
Status: COMPLETED | OUTPATIENT
Start: 2018-05-07 | End: 2018-05-07

## 2018-05-07 RX ADMIN — ALBUTEROL SULFATE 2.5 MG: 2.5 SOLUTION RESPIRATORY (INHALATION) at 13:55

## 2018-05-07 ASSESSMENT — ENCOUNTER SYMPTOMS
WHEEZING: 1
SPUTUM PRODUCTION: 1
COUGH: 1
SHORTNESS OF BREATH: 1

## 2018-05-08 DIAGNOSIS — J42 CHRONIC BRONCHITIS, UNSPECIFIED CHRONIC BRONCHITIS TYPE (HCC): ICD-10-CM

## 2018-05-08 DIAGNOSIS — R06.02 SOB (SHORTNESS OF BREATH): ICD-10-CM

## 2018-05-09 RX ORDER — ALBUTEROL SULFATE 2.5 MG/3ML
2.5 SOLUTION RESPIRATORY (INHALATION) EVERY 6 HOURS PRN
Qty: 120 VIAL | Refills: 3 | Status: SHIPPED | OUTPATIENT
Start: 2018-05-09 | End: 2018-05-18

## 2018-05-14 ENCOUNTER — HOSPITAL ENCOUNTER (OUTPATIENT)
Dept: CT IMAGING | Age: 68
Discharge: HOME | End: 2018-05-16
Payer: MEDICARE

## 2018-05-14 VITALS
HEART RATE: 65 BPM | DIASTOLIC BLOOD PRESSURE: 70 MMHG | WEIGHT: 130 LBS | BODY MASS INDEX: 21.66 KG/M2 | SYSTOLIC BLOOD PRESSURE: 114 MMHG | HEIGHT: 65 IN | RESPIRATION RATE: 16 BRPM

## 2018-05-14 DIAGNOSIS — R10.84 GENERALIZED ABDOMINAL PAIN: ICD-10-CM

## 2018-05-14 DIAGNOSIS — R10.84 GENERALIZED ABDOMINAL PAIN: Primary | ICD-10-CM

## 2018-05-14 DIAGNOSIS — R10.31 RIGHT LOWER QUADRANT ABDOMINAL PAIN: ICD-10-CM

## 2018-05-14 PROCEDURE — 6360000004 HC RX CONTRAST MEDICATION: Performed by: PHYSICIAN ASSISTANT

## 2018-05-14 PROCEDURE — 74177 CT ABD & PELVIS W/CONTRAST: CPT

## 2018-05-14 RX ORDER — SODIUM CHLORIDE 0.9 % (FLUSH) 0.9 %
10 SYRINGE (ML) INJECTION ONCE
Status: DISCONTINUED | OUTPATIENT
Start: 2018-05-14 | End: 2018-05-17 | Stop reason: HOSPADM

## 2018-05-14 RX ADMIN — IOPAMIDOL 100 ML: 755 INJECTION, SOLUTION INTRAVENOUS at 16:48

## 2018-05-15 DIAGNOSIS — F41.1 GENERALIZED ANXIETY DISORDER: ICD-10-CM

## 2018-05-15 DIAGNOSIS — G47.00 INSOMNIA, UNSPECIFIED TYPE: ICD-10-CM

## 2018-05-15 RX ORDER — ALPRAZOLAM 0.5 MG/1
TABLET ORAL
Qty: 30 TABLET | Refills: 2 | Status: SHIPPED | OUTPATIENT
Start: 2018-05-15 | End: 2018-08-13 | Stop reason: SDUPTHER

## 2018-05-18 ENCOUNTER — OFFICE VISIT (OUTPATIENT)
Dept: INTERNAL MEDICINE CLINIC | Age: 68
End: 2018-05-18
Payer: MEDICARE

## 2018-05-18 VITALS — HEIGHT: 65 IN | WEIGHT: 132.4 LBS | BODY MASS INDEX: 22.06 KG/M2

## 2018-05-18 DIAGNOSIS — L97.521 SKIN ULCER OF LEFT FOOT, LIMITED TO BREAKDOWN OF SKIN (HCC): ICD-10-CM

## 2018-05-18 PROCEDURE — 99214 OFFICE O/P EST MOD 30 MIN: CPT | Performed by: FAMILY MEDICINE

## 2018-05-18 RX ORDER — SULFAMETHOXAZOLE AND TRIMETHOPRIM 800; 160 MG/1; MG/1
1 TABLET ORAL 2 TIMES DAILY
Qty: 20 TABLET | Refills: 0 | Status: SHIPPED | OUTPATIENT
Start: 2018-05-18 | End: 2018-05-28

## 2018-06-07 ENCOUNTER — OFFICE VISIT (OUTPATIENT)
Dept: INTERNAL MEDICINE CLINIC | Age: 68
End: 2018-06-07
Payer: MEDICARE

## 2018-06-07 VITALS
WEIGHT: 134.2 LBS | OXYGEN SATURATION: 98 % | TEMPERATURE: 98.1 F | HEIGHT: 65 IN | HEART RATE: 60 BPM | RESPIRATION RATE: 16 BRPM | BODY MASS INDEX: 22.36 KG/M2 | DIASTOLIC BLOOD PRESSURE: 76 MMHG | SYSTOLIC BLOOD PRESSURE: 122 MMHG

## 2018-06-07 DIAGNOSIS — L08.9 TOE INFECTION: ICD-10-CM

## 2018-06-07 PROCEDURE — 99213 OFFICE O/P EST LOW 20 MIN: CPT | Performed by: FAMILY MEDICINE

## 2018-08-13 DIAGNOSIS — F41.1 GENERALIZED ANXIETY DISORDER: ICD-10-CM

## 2018-08-13 DIAGNOSIS — G47.00 INSOMNIA, UNSPECIFIED TYPE: ICD-10-CM

## 2018-08-13 RX ORDER — ALPRAZOLAM 0.5 MG/1
TABLET ORAL
Qty: 30 TABLET | Refills: 2 | Status: SHIPPED | OUTPATIENT
Start: 2018-08-13 | End: 2018-11-15 | Stop reason: SDUPTHER

## 2018-10-12 DIAGNOSIS — J44.1 COPD EXACERBATION (HCC): ICD-10-CM

## 2018-10-14 RX ORDER — ALBUTEROL SULFATE 90 UG/1
AEROSOL, METERED RESPIRATORY (INHALATION)
Qty: 1 INHALER | Refills: 5 | Status: SHIPPED | OUTPATIENT
Start: 2018-10-14

## 2018-11-15 DIAGNOSIS — G47.00 INSOMNIA, UNSPECIFIED TYPE: ICD-10-CM

## 2018-11-15 DIAGNOSIS — F41.1 GENERALIZED ANXIETY DISORDER: ICD-10-CM

## 2018-11-15 RX ORDER — ALPRAZOLAM 0.5 MG/1
TABLET ORAL
Qty: 30 TABLET | Refills: 2 | Status: SHIPPED | OUTPATIENT
Start: 2018-11-15 | End: 2019-02-06 | Stop reason: SDUPTHER

## 2018-11-15 NOTE — TELEPHONE ENCOUNTER
Jared Staton is requesting medication refill. Rx requested:  Requested Prescriptions     Pending Prescriptions Disp Refills    ALPRAZolam (XANAX) 0.5 MG tablet 30 tablet 2     Sig: TAKE 1 TABLET BY MOUTH NIGHTLY.        Last Office Visit:   6/7/2018    Last Tox screen:    10-17-17/Needed    Last Medication contract:    10-23-17/Needed    Next Visit Date:  Future Appointments  Date Time Provider Lashonda Villagran   1/11/2019 12:30 PM Delicia Robbins MD [address] [place] [address]

## 2018-11-19 ENCOUNTER — NURSE ONLY (OUTPATIENT)
Dept: INTERNAL MEDICINE CLINIC | Age: 68
End: 2018-11-19

## 2018-11-19 DIAGNOSIS — Z79.899 LONG-TERM USE OF HIGH-RISK MEDICATION: ICD-10-CM

## 2018-11-19 DIAGNOSIS — Z79.899 LONG-TERM USE OF HIGH-RISK MEDICATION: Primary | ICD-10-CM

## 2018-11-30 ENCOUNTER — OFFICE VISIT (OUTPATIENT)
Dept: INTERNAL MEDICINE CLINIC | Age: 68
End: 2018-11-30
Payer: MEDICARE

## 2018-11-30 VITALS
HEART RATE: 69 BPM | TEMPERATURE: 97.9 F | SYSTOLIC BLOOD PRESSURE: 110 MMHG | RESPIRATION RATE: 16 BRPM | OXYGEN SATURATION: 97 % | HEIGHT: 65 IN | DIASTOLIC BLOOD PRESSURE: 70 MMHG | BODY MASS INDEX: 22.49 KG/M2 | WEIGHT: 135 LBS

## 2018-11-30 DIAGNOSIS — L08.9 BACTERIAL SKIN INFECTION: Primary | ICD-10-CM

## 2018-11-30 DIAGNOSIS — L73.9 FOLLICULITIS: ICD-10-CM

## 2018-11-30 DIAGNOSIS — B96.89 BACTERIAL SKIN INFECTION: Primary | ICD-10-CM

## 2018-11-30 LAB — TSH SERPL DL<=0.05 MIU/L-ACNC: 1.6 UIU/ML

## 2018-11-30 PROCEDURE — 99213 OFFICE O/P EST LOW 20 MIN: CPT | Performed by: PHYSICIAN ASSISTANT

## 2018-11-30 RX ORDER — GREEN TEA/HOODIA GORDONII 315-12.5MG
1 CAPSULE ORAL 2 TIMES DAILY
Qty: 60 TABLET | Refills: 0 | Status: SHIPPED | OUTPATIENT
Start: 2018-11-30 | End: 2018-12-30

## 2018-11-30 RX ORDER — SULFAMETHOXAZOLE AND TRIMETHOPRIM 800; 160 MG/1; MG/1
1 TABLET ORAL ONCE
Qty: 1 TABLET | Refills: 1 | Status: SHIPPED | OUTPATIENT
Start: 2018-11-30 | End: 2018-11-30

## 2018-11-30 RX ORDER — FLUCONAZOLE 150 MG/1
150 TABLET ORAL ONCE
Qty: 1 TABLET | Refills: 0 | Status: SHIPPED | OUTPATIENT
Start: 2018-11-30 | End: 2018-11-30

## 2018-11-30 ASSESSMENT — ENCOUNTER SYMPTOMS
COUGH: 1
EYES NEGATIVE: 1
BACK PAIN: 0
SHORTNESS OF BREATH: 1
ABDOMINAL PAIN: 0

## 2018-12-03 ENCOUNTER — TELEPHONE (OUTPATIENT)
Dept: INTERNAL MEDICINE CLINIC | Age: 68
End: 2018-12-03

## 2018-12-03 RX ORDER — SULFAMETHOXAZOLE AND TRIMETHOPRIM 800; 160 MG/1; MG/1
1 TABLET ORAL 2 TIMES DAILY
Qty: 20 TABLET | Refills: 0 | Status: SHIPPED | OUTPATIENT
Start: 2018-12-03 | End: 2018-12-13

## 2018-12-10 ENCOUNTER — OFFICE VISIT (OUTPATIENT)
Dept: INTERNAL MEDICINE CLINIC | Age: 68
End: 2018-12-10
Payer: MEDICARE

## 2018-12-10 VITALS
HEART RATE: 78 BPM | TEMPERATURE: 97.9 F | RESPIRATION RATE: 14 BRPM | BODY MASS INDEX: 21.83 KG/M2 | OXYGEN SATURATION: 97 % | HEIGHT: 65 IN | DIASTOLIC BLOOD PRESSURE: 72 MMHG | SYSTOLIC BLOOD PRESSURE: 126 MMHG | WEIGHT: 131 LBS

## 2018-12-10 DIAGNOSIS — N76.0 VAGINOSIS: ICD-10-CM

## 2018-12-10 DIAGNOSIS — R30.0 DYSURIA: Primary | ICD-10-CM

## 2018-12-10 DIAGNOSIS — R30.0 DYSURIA: ICD-10-CM

## 2018-12-10 LAB
BILIRUBIN URINE: NEGATIVE
BILIRUBIN, POC: NORMAL
BLOOD URINE, POC: NORMAL
BLOOD, URINE: NEGATIVE
CLARITY, POC: CLEAR
CLARITY: CLEAR
COLOR, POC: YELLOW
COLOR: YELLOW
GLUCOSE URINE, POC: NORMAL
GLUCOSE URINE: NEGATIVE MG/DL
KETONES, POC: NORMAL
KETONES, URINE: NEGATIVE MG/DL
LEUKOCYTE EST, POC: NORMAL
LEUKOCYTE ESTERASE, URINE: NEGATIVE
NITRITE, POC: NORMAL
NITRITE, URINE: NEGATIVE
PH UA: 5.5 (ref 5–9)
PH, POC: 6
PROTEIN UA: NEGATIVE MG/DL
PROTEIN, POC: NORMAL
SPECIFIC GRAVITY UA: 1.01 (ref 1–1.03)
SPECIFIC GRAVITY, POC: 1.02
URINE REFLEX TO CULTURE: NORMAL
UROBILINOGEN, POC: 0.2
UROBILINOGEN, URINE: 0.2 E.U./DL

## 2018-12-10 PROCEDURE — 81002 URINALYSIS NONAUTO W/O SCOPE: CPT | Performed by: PHYSICIAN ASSISTANT

## 2018-12-10 PROCEDURE — 99213 OFFICE O/P EST LOW 20 MIN: CPT | Performed by: PHYSICIAN ASSISTANT

## 2018-12-10 RX ORDER — METRONIDAZOLE 7.5 MG/G
1 GEL VAGINAL 2 TIMES DAILY
Qty: 1 TUBE | Refills: 0 | Status: SHIPPED | OUTPATIENT
Start: 2018-12-10 | End: 2018-12-15

## 2018-12-10 ASSESSMENT — ENCOUNTER SYMPTOMS
EYES NEGATIVE: 1
GASTROINTESTINAL NEGATIVE: 1
RESPIRATORY NEGATIVE: 1

## 2018-12-10 NOTE — PROGRESS NOTES
Subjective  Kayla Exon, 79 y.o. female presents today with:  Chief Complaint   Patient presents with    Vaginal Discharge     Whitish yellow discharge     Incontinence     Urinary - foul smell x 1 week       HPI  Elin Prince is here with complaint of foul-smelling light yellowish vaginal discharge and treatment for left lower extremity ulceration with Keflex  Also complaining of urinary incontinence and frequency for the past week    Review of Systems   Constitutional: Negative. HENT: Negative. Eyes: Negative. Respiratory: Negative. Cardiovascular: Negative. Gastrointestinal: Negative. Endocrine: Negative. Genitourinary: Positive for dysuria, frequency, urgency and vaginal discharge. Musculoskeletal: Negative. Skin: Negative. Neurological: Negative. Hematological: Negative. Psychiatric/Behavioral: Negative. Past Medical History:   Diagnosis Date    Anxiety     Asthma     Cancer ([facility] [address] 2018    squamous cell- eye brow - CC    Chronic constipation     Colon polyps     COPD (chronic obstructive pulmonary disease) (HCC)     Cyst of nipple     Endometriosis     Fibromyalgia     Goiter     Goiter     Hyperlipidemia     Hypothyroidism     Osteoarthritis     Thyroid ca ([facility] [address]     TMJ (dislocation of temporomandibular joint)     TMJ arthritis      Past Surgical History:   Procedure Laterality Date    BREAST SURGERY      COLONOSCOPY      THYROIDECTOMY      THYROIDECTOMY Left 1/6/2015     Social History     Social History    Marital status:      Spouse name: N/A    Number of children: N/A    Years of education: N/A     Occupational History    Not on file.      Social History Main Topics    Smoking status: Current Some Day Smoker     Packs/day: 0.25     Years: 30.00     Types: Cigarettes    Smokeless tobacco: Never Used      Comment: She smokes 1 cigarette twice a day    Alcohol use Yes    Drug use: No    Sexual activity: Yes     Partners: Male conjugated estrogens (PREMARIN) 0.625 MG/GM vaginal cream Place vaginally daily. 1 Tube 3    levothyroxine (SYNTHROID) 50 MCG tablet Take 50 mcg by mouth daily   3    vitamin D3 (CHOLECALCIFEROL) 1000 UNITS TABS tablet Take 2 tablets by mouth daily        No current facility-administered medications for this visit. Objective    Vitals:    12/10/18 1244   BP: 126/72   Site: Left Upper Arm   Position: Sitting   Cuff Size: Small Adult   Pulse: 78   Resp: 14   Temp: 97.9 °F (36.6 °C)   TempSrc: Oral   SpO2: 97%   Weight: 131 lb (59.4 kg)   Height: 5' 5\" (1.651 m)     Physical Exam   Constitutional: She is oriented to person, place, and time. She appears well-developed and well-nourished. HENT:   Head: Normocephalic and atraumatic. Eyes: Pupils are equal, round, and reactive to light. Conjunctivae and EOM are normal.   Neck: Normal range of motion. Neck supple. Cardiovascular: Normal rate, regular rhythm and normal heart sounds. Pulmonary/Chest: Effort normal and breath sounds normal.   Abdominal: Soft. Bowel sounds are normal. There is tenderness in the suprapubic area and left lower quadrant. Neurological: She is alert and oriented to person, place, and time. Skin: Skin is warm and dry. Assessment & Plan    Diagnosis Orders   1. Dysuria  POCT Urinalysis no Micro    Urine Reflex to Culture   2. Vaginosis  Urine Reflex to Culture         Orders Placed This Encounter   Procedures    Urine Reflex to Culture     Standing Status:   Future     Standing Expiration Date:   12/10/2019     Order Specific Question:   SPECIFY(EX-CATH,MIDSTREAM,CYSTO,ETC)? Answer:   mid stream    POCT Urinalysis no Micro     Orders Placed This Encounter   Medications    metroNIDAZOLE (METROGEL) 0.75 % vaginal gel     Sig: Place 1 Applicatorful vaginally 2 times daily for 5 days Place vaginally 2 times daily. Dispense:  1 Tube     Refill:  0     There are no discontinued medications.   Return for call for

## 2018-12-12 ENCOUNTER — OFFICE VISIT (OUTPATIENT)
Dept: INTERNAL MEDICINE CLINIC | Age: 68
End: 2018-12-12
Payer: MEDICARE

## 2018-12-12 VITALS
HEIGHT: 65 IN | OXYGEN SATURATION: 97 % | BODY MASS INDEX: 21.99 KG/M2 | SYSTOLIC BLOOD PRESSURE: 130 MMHG | WEIGHT: 132 LBS | DIASTOLIC BLOOD PRESSURE: 76 MMHG | TEMPERATURE: 97.8 F | HEART RATE: 70 BPM | RESPIRATION RATE: 18 BRPM

## 2018-12-12 DIAGNOSIS — N95.0 POSTMENOPAUSAL VAGINAL BLEEDING: ICD-10-CM

## 2018-12-12 DIAGNOSIS — Z01.419 ENCOUNTER FOR GYNECOLOGICAL EXAMINATION WITHOUT ABNORMAL FINDING: ICD-10-CM

## 2018-12-12 DIAGNOSIS — R10.32 LEFT LOWER QUADRANT PAIN: Primary | ICD-10-CM

## 2018-12-12 LAB
BACTERIA: NORMAL /HPF
BILIRUBIN URINE: NEGATIVE
BLOOD, URINE: ABNORMAL
CLARITY: CLEAR
COLOR: YELLOW
EPITHELIAL CELLS, UA: NORMAL /HPF
GLUCOSE URINE: NEGATIVE MG/DL
KETONES, URINE: NEGATIVE MG/DL
LEUKOCYTE ESTERASE, URINE: NEGATIVE
NITRITE, URINE: NEGATIVE
PH UA: 7 (ref 5–9)
PROTEIN UA: NEGATIVE MG/DL
RBC UA: NORMAL /HPF (ref 0–2)
SPECIFIC GRAVITY UA: 1 (ref 1–1.03)
URINE REFLEX TO CULTURE: YES
UROBILINOGEN, URINE: 0.2 E.U./DL
WBC UA: NORMAL /HPF (ref 0–5)

## 2018-12-12 PROCEDURE — 99214 OFFICE O/P EST MOD 30 MIN: CPT | Performed by: PHYSICIAN ASSISTANT

## 2018-12-12 ASSESSMENT — ENCOUNTER SYMPTOMS
RESPIRATORY NEGATIVE: 1
GASTROINTESTINAL NEGATIVE: 1
ALLERGIC/IMMUNOLOGIC NEGATIVE: 1
EYES NEGATIVE: 1

## 2018-12-12 NOTE — PROGRESS NOTES
12/12/2019    US PELVIS COMPLETE     Standing Status:   Future     Standing Expiration Date:   12/12/2019     Order Specific Question:   Reason for exam:     Answer:   tranvsaginal if indicated    Pap Smear     Patient History:    Patient's last menstrual period was 01/09/1995. OBGYN Status: Postmenopausal  Past Surgical History:  No date: BREAST SURGERY  No date: COLONOSCOPY  No date: THYROIDECTOMY  1/6/2015: THYROIDECTOMY Left      Smoking status: Current Some Day Smoker                                                    Packs/day: 0.25      Years: 30.00        Types: Cigarettes  Smokeless tobacco: Never Used                      Comment: She smokes 1 cigarette twice a day       Standing Status:   Future     Standing Expiration Date:   12/12/2019     Order Specific Question:   Collection Type     Answer: Thin Prep     Order Specific Question:   Prior Abnormal Pap Test     Answer:   No     Order Specific Question:   Screening or Diagnostic     Answer:   Screening     Order Specific Question:   HPV Requested? Answer:   Yes -  If ASCUS Reflex HPV     Order Specific Question:   High Risk Patient     Answer:   N/A    Urine Reflex to Culture     Standing Status:   Future     Standing Expiration Date:   12/12/2019     Order Specific Question:   SPECIFY(EX-CATH,MIDSTREAM,CYSTO,ETC)? Answer:   unknown. No orders of the defined types were placed in this encounter. There are no discontinued medications. No Follow-up on file.     Mini Wiseman PA-C

## 2018-12-13 LAB
CLUE CELLS: NORMAL
TRICHOMONAS PREP: NORMAL
TRICHOMONAS VAGINALIS SCREEN: NEGATIVE
YEAST WET PREP: NORMAL

## 2018-12-14 LAB — URINE CULTURE, ROUTINE: NORMAL

## 2018-12-15 LAB — GENITAL CULTURE, ROUTINE: NORMAL

## 2018-12-17 LAB
C TRACH DNA GENITAL QL NAA+PROBE: NEGATIVE
N. GONORRHOEAE DNA: NEGATIVE

## 2019-01-11 ENCOUNTER — OFFICE VISIT (OUTPATIENT)
Dept: CARDIOLOGY CLINIC | Age: 69
End: 2019-01-11
Payer: MEDICARE

## 2019-01-11 VITALS
HEART RATE: 58 BPM | OXYGEN SATURATION: 98 % | BODY MASS INDEX: 21.66 KG/M2 | SYSTOLIC BLOOD PRESSURE: 119 MMHG | HEIGHT: 65 IN | WEIGHT: 130 LBS | RESPIRATION RATE: 18 BRPM | DIASTOLIC BLOOD PRESSURE: 74 MMHG

## 2019-01-11 DIAGNOSIS — R07.9 CHEST PAIN, UNSPECIFIED TYPE: Primary | ICD-10-CM

## 2019-01-11 DIAGNOSIS — R09.89 BILATERAL CAROTID BRUITS: ICD-10-CM

## 2019-01-11 DIAGNOSIS — I10 ESSENTIAL HYPERTENSION: ICD-10-CM

## 2019-01-11 DIAGNOSIS — E78.2 MIXED HYPERLIPIDEMIA: ICD-10-CM

## 2019-01-11 PROCEDURE — 93000 ELECTROCARDIOGRAM COMPLETE: CPT | Performed by: INTERNAL MEDICINE

## 2019-01-11 PROCEDURE — 99203 OFFICE O/P NEW LOW 30 MIN: CPT | Performed by: INTERNAL MEDICINE

## 2019-01-11 ASSESSMENT — ENCOUNTER SYMPTOMS
EYES NEGATIVE: 1
NAUSEA: 0
STRIDOR: 0
BLOOD IN STOOL: 0
SHORTNESS OF BREATH: 0
RESPIRATORY NEGATIVE: 1
GASTROINTESTINAL NEGATIVE: 1
COUGH: 0
WHEEZING: 0
CHEST TIGHTNESS: 0

## 2019-01-17 ENCOUNTER — OFFICE VISIT (OUTPATIENT)
Dept: INTERNAL MEDICINE CLINIC | Age: 69
End: 2019-01-17
Payer: MEDICARE

## 2019-01-17 VITALS
SYSTOLIC BLOOD PRESSURE: 124 MMHG | HEART RATE: 77 BPM | TEMPERATURE: 98.4 F | OXYGEN SATURATION: 96 % | WEIGHT: 130.4 LBS | BODY MASS INDEX: 21.73 KG/M2 | DIASTOLIC BLOOD PRESSURE: 80 MMHG | HEIGHT: 65 IN | RESPIRATION RATE: 16 BRPM

## 2019-01-17 DIAGNOSIS — J20.9 ACUTE BRONCHITIS, UNSPECIFIED ORGANISM: ICD-10-CM

## 2019-01-17 DIAGNOSIS — Z12.31 VISIT FOR SCREENING MAMMOGRAM: ICD-10-CM

## 2019-01-17 DIAGNOSIS — J01.00 ACUTE NON-RECURRENT MAXILLARY SINUSITIS: Primary | ICD-10-CM

## 2019-01-17 PROCEDURE — 99213 OFFICE O/P EST LOW 20 MIN: CPT | Performed by: FAMILY MEDICINE

## 2019-01-17 RX ORDER — PREDNISONE 20 MG/1
20 TABLET ORAL 2 TIMES DAILY
Qty: 10 TABLET | Refills: 0 | Status: SHIPPED | OUTPATIENT
Start: 2019-01-17 | End: 2019-04-01 | Stop reason: SDUPTHER

## 2019-01-17 RX ORDER — BENZONATATE 200 MG/1
200 CAPSULE ORAL 3 TIMES DAILY PRN
Qty: 30 CAPSULE | Refills: 0 | Status: SHIPPED | OUTPATIENT
Start: 2019-01-17 | End: 2019-01-27

## 2019-01-17 RX ORDER — DOXYCYCLINE HYCLATE 100 MG
100 TABLET ORAL 2 TIMES DAILY
Qty: 20 TABLET | Refills: 0 | Status: SHIPPED | OUTPATIENT
Start: 2019-01-17 | End: 2019-04-01 | Stop reason: SDUPTHER

## 2019-01-25 ENCOUNTER — HOSPITAL ENCOUNTER (OUTPATIENT)
Dept: ULTRASOUND IMAGING | Age: 69
Discharge: HOME | End: 2019-01-27
Payer: MEDICARE

## 2019-01-25 ENCOUNTER — HOSPITAL ENCOUNTER (OUTPATIENT)
Dept: NON INVASIVE DIAGNOSTICS | Age: 69
Discharge: HOME | End: 2019-01-25
Payer: MEDICARE

## 2019-01-25 DIAGNOSIS — R07.9 CHEST PAIN, UNSPECIFIED TYPE: ICD-10-CM

## 2019-01-25 DIAGNOSIS — R09.89 BILATERAL CAROTID BRUITS: ICD-10-CM

## 2019-01-25 LAB
LV EF: 60 %
LVEF MODALITY: NORMAL

## 2019-01-25 PROCEDURE — 93306 TTE W/DOPPLER COMPLETE: CPT

## 2019-01-25 PROCEDURE — 93880 EXTRACRANIAL BILAT STUDY: CPT

## 2019-01-25 PROCEDURE — 93880 EXTRACRANIAL BILAT STUDY: CPT | Performed by: INTERNAL MEDICINE

## 2019-02-05 ENCOUNTER — HOSPITAL ENCOUNTER (OUTPATIENT)
Dept: WOMENS IMAGING | Age: 69
Discharge: HOME | End: 2019-02-07
Payer: MEDICARE

## 2019-02-05 DIAGNOSIS — Z12.31 VISIT FOR SCREENING MAMMOGRAM: ICD-10-CM

## 2019-02-05 DIAGNOSIS — E78.2 MIXED HYPERLIPIDEMIA: ICD-10-CM

## 2019-02-05 DIAGNOSIS — I10 ESSENTIAL HYPERTENSION: ICD-10-CM

## 2019-02-05 LAB
ALBUMIN SERPL-MCNC: 4.2 G/DL (ref 3.9–4.9)
ALP BLD-CCNC: 98 U/L (ref 40–130)
ALT SERPL-CCNC: 11 U/L (ref 0–33)
ANION GAP SERPL CALCULATED.3IONS-SCNC: 13 MEQ/L (ref 7–13)
AST SERPL-CCNC: 17 U/L (ref 0–35)
BILIRUB SERPL-MCNC: 0.4 MG/DL (ref 0–1.2)
BUN BLDV-MCNC: 11 MG/DL (ref 8–23)
CALCIUM SERPL-MCNC: 9.6 MG/DL (ref 8.6–10.2)
CHLORIDE BLD-SCNC: 101 MEQ/L (ref 98–107)
CHOLESTEROL, TOTAL: 256 MG/DL (ref 0–199)
CO2: 27 MEQ/L (ref 22–29)
CREAT SERPL-MCNC: 0.68 MG/DL (ref 0.5–0.9)
GFR AFRICAN AMERICAN: >60
GFR NON-AFRICAN AMERICAN: >60
GLOBULIN: 2.5 G/DL (ref 2.3–3.5)
GLUCOSE BLD-MCNC: 96 MG/DL (ref 74–109)
HCT VFR BLD CALC: 43.9 % (ref 37–47)
HDLC SERPL-MCNC: 70 MG/DL (ref 40–59)
HEMOGLOBIN: 14.7 G/DL (ref 12–16)
LDL CHOLESTEROL CALCULATED: 163 MG/DL (ref 0–129)
MAGNESIUM: 2.1 MG/DL (ref 1.7–2.3)
MCH RBC QN AUTO: 31.7 PG (ref 27–31.3)
MCHC RBC AUTO-ENTMCNC: 33.5 % (ref 33–37)
MCV RBC AUTO: 94.4 FL (ref 82–100)
PDW BLD-RTO: 13.8 % (ref 11.5–14.5)
PLATELET # BLD: 282 K/UL (ref 130–400)
POTASSIUM SERPL-SCNC: 4.3 MEQ/L (ref 3.5–5.1)
RBC # BLD: 4.65 M/UL (ref 4.2–5.4)
SODIUM BLD-SCNC: 141 MEQ/L (ref 132–144)
TOTAL PROTEIN: 6.7 G/DL (ref 6.4–8.1)
TRIGL SERPL-MCNC: 116 MG/DL (ref 0–200)
WBC # BLD: 4.1 K/UL (ref 4.8–10.8)

## 2019-02-05 PROCEDURE — 77067 SCR MAMMO BI INCL CAD: CPT

## 2019-02-06 DIAGNOSIS — G47.00 INSOMNIA, UNSPECIFIED TYPE: ICD-10-CM

## 2019-02-06 DIAGNOSIS — F41.1 GENERALIZED ANXIETY DISORDER: ICD-10-CM

## 2019-02-06 RX ORDER — ALPRAZOLAM 0.5 MG/1
TABLET ORAL
Qty: 30 TABLET | Refills: 2 | Status: SHIPPED | OUTPATIENT
Start: 2019-02-06 | End: 2019-05-14 | Stop reason: SDUPTHER

## 2019-03-28 ENCOUNTER — OFFICE VISIT (OUTPATIENT)
Dept: CARDIOLOGY CLINIC | Age: 69
End: 2019-03-28
Payer: MEDICARE

## 2019-03-28 VITALS
HEIGHT: 65 IN | WEIGHT: 131.2 LBS | DIASTOLIC BLOOD PRESSURE: 78 MMHG | HEART RATE: 74 BPM | RESPIRATION RATE: 20 BRPM | SYSTOLIC BLOOD PRESSURE: 118 MMHG | BODY MASS INDEX: 21.86 KG/M2 | OXYGEN SATURATION: 98 %

## 2019-03-28 DIAGNOSIS — E78.5 HYPERLIPIDEMIA, UNSPECIFIED HYPERLIPIDEMIA TYPE: ICD-10-CM

## 2019-03-28 DIAGNOSIS — I65.23 BILATERAL CAROTID ARTERY STENOSIS: ICD-10-CM

## 2019-03-28 DIAGNOSIS — I25.10 CORONARY ARTERY DISEASE INVOLVING NATIVE CORONARY ARTERY OF NATIVE HEART WITHOUT ANGINA PECTORIS: Primary | ICD-10-CM

## 2019-03-28 DIAGNOSIS — I10 ESSENTIAL HYPERTENSION: ICD-10-CM

## 2019-03-28 PROBLEM — I48.91 ATRIAL FIBRILLATION (HCC): Status: ACTIVE | Noted: 2019-03-28

## 2019-03-28 PROCEDURE — 99215 OFFICE O/P EST HI 40 MIN: CPT | Performed by: INTERNAL MEDICINE

## 2019-03-28 ASSESSMENT — ENCOUNTER SYMPTOMS
CHEST TIGHTNESS: 0
SHORTNESS OF BREATH: 0
EYES NEGATIVE: 1
GASTROINTESTINAL NEGATIVE: 1
STRIDOR: 0
NAUSEA: 0
COUGH: 1
WHEEZING: 0
BLOOD IN STOOL: 0

## 2019-04-01 ENCOUNTER — OFFICE VISIT (OUTPATIENT)
Dept: INTERNAL MEDICINE CLINIC | Age: 69
End: 2019-04-01
Payer: MEDICARE

## 2019-04-01 VITALS
WEIGHT: 129.8 LBS | OXYGEN SATURATION: 98 % | BODY MASS INDEX: 21.63 KG/M2 | HEIGHT: 65 IN | SYSTOLIC BLOOD PRESSURE: 124 MMHG | TEMPERATURE: 97.8 F | HEART RATE: 66 BPM | RESPIRATION RATE: 16 BRPM | DIASTOLIC BLOOD PRESSURE: 80 MMHG

## 2019-04-01 DIAGNOSIS — J41.0 SIMPLE CHRONIC BRONCHITIS (HCC): ICD-10-CM

## 2019-04-01 DIAGNOSIS — J01.00 ACUTE NON-RECURRENT MAXILLARY SINUSITIS: ICD-10-CM

## 2019-04-01 PROCEDURE — 99213 OFFICE O/P EST LOW 20 MIN: CPT | Performed by: PHYSICIAN ASSISTANT

## 2019-04-01 RX ORDER — DOXYCYCLINE HYCLATE 100 MG
100 TABLET ORAL 2 TIMES DAILY
Qty: 20 TABLET | Refills: 0 | Status: SHIPPED | OUTPATIENT
Start: 2019-04-01 | End: 2019-04-11 | Stop reason: ALTCHOICE

## 2019-04-01 RX ORDER — FLUTICASONE PROPIONATE 50 MCG
1 SPRAY, SUSPENSION (ML) NASAL DAILY
Qty: 1 BOTTLE | Refills: 1 | Status: SHIPPED | OUTPATIENT
Start: 2019-04-01 | End: 2019-04-23 | Stop reason: SDUPTHER

## 2019-04-01 RX ORDER — PREDNISONE 20 MG/1
20 TABLET ORAL 2 TIMES DAILY
Qty: 10 TABLET | Refills: 0 | Status: SHIPPED | OUTPATIENT
Start: 2019-04-01 | End: 2019-07-01 | Stop reason: SDUPTHER

## 2019-04-01 ASSESSMENT — ENCOUNTER SYMPTOMS
NAUSEA: 0
VOMITING: 0
BACK PAIN: 0
SORE THROAT: 0
CONSTIPATION: 0
DIARRHEA: 0
COUGH: 1
EYE PAIN: 0
WHEEZING: 0
SHORTNESS OF BREATH: 0

## 2019-04-01 ASSESSMENT — PATIENT HEALTH QUESTIONNAIRE - PHQ9
SUM OF ALL RESPONSES TO PHQ QUESTIONS 1-9: 0
SUM OF ALL RESPONSES TO PHQ9 QUESTIONS 1 & 2: 0
1. LITTLE INTEREST OR PLEASURE IN DOING THINGS: 0
2. FEELING DOWN, DEPRESSED OR HOPELESS: 0
SUM OF ALL RESPONSES TO PHQ QUESTIONS 1-9: 0

## 2019-04-02 PROBLEM — J41.0 SIMPLE CHRONIC BRONCHITIS (HCC): Status: ACTIVE | Noted: 2019-04-02

## 2019-04-03 DIAGNOSIS — I65.23 BILATERAL CAROTID ARTERY STENOSIS: Primary | ICD-10-CM

## 2019-04-03 DIAGNOSIS — R93.89 ABNORMAL CAROTID ULTRASOUND: ICD-10-CM

## 2019-04-05 ENCOUNTER — HOSPITAL ENCOUNTER (OUTPATIENT)
Dept: CT IMAGING | Age: 69
Discharge: HOME | End: 2019-04-07
Payer: MEDICARE

## 2019-04-05 VITALS
SYSTOLIC BLOOD PRESSURE: 130 MMHG | RESPIRATION RATE: 16 BRPM | HEIGHT: 65 IN | HEART RATE: 62 BPM | DIASTOLIC BLOOD PRESSURE: 78 MMHG | WEIGHT: 130 LBS | BODY MASS INDEX: 21.66 KG/M2

## 2019-04-05 DIAGNOSIS — I65.23 BILATERAL CAROTID ARTERY STENOSIS: ICD-10-CM

## 2019-04-05 DIAGNOSIS — R93.89 ABNORMAL CAROTID ULTRASOUND: ICD-10-CM

## 2019-04-05 PROCEDURE — 70498 CT ANGIOGRAPHY NECK: CPT

## 2019-04-05 PROCEDURE — 2580000003 HC RX 258: Performed by: INTERNAL MEDICINE

## 2019-04-05 PROCEDURE — 6360000004 HC RX CONTRAST MEDICATION: Performed by: INTERNAL MEDICINE

## 2019-04-05 RX ORDER — SODIUM CHLORIDE 0.9 % (FLUSH) 0.9 %
10 SYRINGE (ML) INJECTION ONCE
Status: COMPLETED | OUTPATIENT
Start: 2019-04-05 | End: 2019-04-05

## 2019-04-05 RX ADMIN — Medication 10 ML: at 12:01

## 2019-04-05 RX ADMIN — IOPAMIDOL 100 ML: 612 INJECTION, SOLUTION INTRAVENOUS at 12:01

## 2019-04-08 ENCOUNTER — TELEPHONE (OUTPATIENT)
Dept: CARDIOLOGY CLINIC | Age: 69
End: 2019-04-08

## 2019-04-11 ENCOUNTER — OFFICE VISIT (OUTPATIENT)
Dept: CARDIOLOGY CLINIC | Age: 69
End: 2019-04-11
Payer: MEDICARE

## 2019-04-11 VITALS
RESPIRATION RATE: 16 BRPM | DIASTOLIC BLOOD PRESSURE: 68 MMHG | WEIGHT: 129 LBS | BODY MASS INDEX: 21.49 KG/M2 | HEART RATE: 75 BPM | SYSTOLIC BLOOD PRESSURE: 106 MMHG | OXYGEN SATURATION: 98 % | HEIGHT: 65 IN

## 2019-04-11 DIAGNOSIS — I10 ESSENTIAL HYPERTENSION: ICD-10-CM

## 2019-04-11 DIAGNOSIS — Z72.0 TOBACCO ABUSE: ICD-10-CM

## 2019-04-11 DIAGNOSIS — E78.5 HYPERLIPIDEMIA, UNSPECIFIED HYPERLIPIDEMIA TYPE: ICD-10-CM

## 2019-04-11 DIAGNOSIS — R09.89 BILATERAL CAROTID BRUITS: ICD-10-CM

## 2019-04-11 DIAGNOSIS — E78.2 MIXED HYPERLIPIDEMIA: Primary | ICD-10-CM

## 2019-04-11 DIAGNOSIS — R07.9 CHEST PAIN, UNSPECIFIED TYPE: ICD-10-CM

## 2019-04-11 DIAGNOSIS — I65.23 BILATERAL CAROTID ARTERY STENOSIS: ICD-10-CM

## 2019-04-11 LAB
ANION GAP SERPL CALCULATED.3IONS-SCNC: 14 MEQ/L (ref 9–15)
BUN BLDV-MCNC: 19 MG/DL (ref 8–23)
CALCIUM SERPL-MCNC: 9.8 MG/DL (ref 8.5–9.9)
CHLORIDE BLD-SCNC: 100 MEQ/L (ref 95–107)
CO2: 28 MEQ/L (ref 20–31)
CREAT SERPL-MCNC: 0.64 MG/DL (ref 0.5–0.9)
GFR AFRICAN AMERICAN: >60
GFR NON-AFRICAN AMERICAN: >60
GLUCOSE BLD-MCNC: 73 MG/DL (ref 70–99)
HCT VFR BLD CALC: 43.7 % (ref 37–47)
HEMOGLOBIN: 14.7 G/DL (ref 12–16)
MCH RBC QN AUTO: 31.9 PG (ref 27–31.3)
MCHC RBC AUTO-ENTMCNC: 33.7 % (ref 33–37)
MCV RBC AUTO: 94.5 FL (ref 82–100)
PDW BLD-RTO: 13.8 % (ref 11.5–14.5)
PLATELET # BLD: 208 K/UL (ref 130–400)
POTASSIUM SERPL-SCNC: 5 MEQ/L (ref 3.4–4.9)
RBC # BLD: 4.62 M/UL (ref 4.2–5.4)
SODIUM BLD-SCNC: 142 MEQ/L (ref 135–144)
WBC # BLD: 7.4 K/UL (ref 4.8–10.8)

## 2019-04-11 PROCEDURE — 99215 OFFICE O/P EST HI 40 MIN: CPT | Performed by: INTERNAL MEDICINE

## 2019-04-11 ASSESSMENT — ENCOUNTER SYMPTOMS
BLOOD IN STOOL: 0
WHEEZING: 0
STRIDOR: 0
NAUSEA: 0
EYES NEGATIVE: 1
GASTROINTESTINAL NEGATIVE: 1
COUGH: 0
SHORTNESS OF BREATH: 1
CHEST TIGHTNESS: 1

## 2019-04-11 NOTE — PROGRESS NOTES
Subsequent Progress Note  Patient: Perla Hood  YOB: 1950  MRN: [id number]    Chief Complaint: carotid CP  Chief Complaint   Patient presents with    Carotid Disease     Review CTA    Hypertension       CV Data:  2/2017 spect negative ccf  2/2017 PVR negative ccf  1/2019 CUS extensive atherosclerosis R>L  1/19 echo ef 60   4/5/2019 CTA neck - CHERELLE 97%  LICA 34  Subjective/HPI: still has cp achy feeling +sob no more bleed. Colonoscopy showed no bleed.  She has h/o hemorrhoids     Smokes 1/2 ppd   Retired     EKG:    Past Medical History:   Diagnosis Date    Anxiety     Asthma     Cancer ([facility] [address] 2018    squamous cell- eye brow - CC    Chronic constipation     Colon polyps     COPD (chronic obstructive pulmonary disease) (HCC)     Cyst of nipple     Endometriosis     Fibromyalgia     Goiter     Goiter     Hyperlipidemia     Hypothyroidism     Osteoarthritis     Thyroid ca (HCC)     TMJ (dislocation of temporomandibular joint)     TMJ arthritis        Past Surgical History:   Procedure Laterality Date    BREAST SURGERY      COLONOSCOPY      THYROIDECTOMY      THYROIDECTOMY Left 1/6/2015       Family History   Problem Relation Age of Onset    Heart Disease Mother 61        CABG    Cancer Mother [de-identified]        lung    High Blood Pressure Mother     Heart Disease Father 48    High Blood Pressure Maternal Grandmother     High Blood Pressure Paternal Grandmother     Heart Attack Neg Hx     Hypertension Neg Hx     Diabetes Neg Hx        Social History     Socioeconomic History    Marital status:      Spouse name: None    Number of children: None    Years of education: None    Highest education level: None   Occupational History    None   Social Needs    Financial resource strain: None    Food insecurity:     Worry: None     Inability: None    Transportation needs:     Medical: None     Non-medical: None   Tobacco Use    Smoking status: Current Some Day Smoker     Packs/day: 0.25     Years: 30.00     Pack years: 7.50     Types: Cigarettes    Smokeless tobacco: Never Used    Tobacco comment: She smokes 1 cigarette twice a day   Substance and Sexual Activity    Alcohol use: Yes    Drug use: No    Sexual activity: Yes     Partners: Male   Lifestyle    Physical activity:     Days per week: None     Minutes per session: None    Stress: None   Relationships    Social connections:     Talks on phone: None     Gets together: None     Attends Hindu service: None     Active member of club or organization: None     Attends meetings of clubs or organizations: None     Relationship status: None    Intimate partner violence:     Fear of current or ex partner: None     Emotionally abused: None     Physically abused: None     Forced sexual activity: None   Other Topics Concern    None   Social History Narrative    None       Allergies   Allergen Reactions    Morphine      Tremor    Cefdinir Itching and Other (See Comments)    Fosamax [Alendronate] Other (See Comments)     Muscle aches      Pantoprazole Other (See Comments)     abdominal cramping, uncontrolled BM.  Simvastatin      muscle soreness.  Zetia [Ezetimibe]     Zocor [Simvastatin - High Dose]     Adhesive Tape Rash       Current Outpatient Medications   Medication Sig Dispense Refill    aspirin 81 MG tablet Take 1 tablet by mouth daily With Food 30 tablet 3    fluticasone (FLONASE) 50 MCG/ACT nasal spray 1 spray by Each Nare route daily 1 Spray in each nostril 1 Bottle 1    pitavastatin (LIVALO) 1 MG TABS tablet Take 0.5 tablets by mouth nightly 30 tablet 3    ALPRAZolam (XANAX) 0.5 MG tablet TAKE 1 TABLET BY MOUTH NIGHTLY.  30 tablet 2    DULERA 200-5 MCG/ACT inhaler INHALE 1 PUFF EVERY 12 HOURS AS DIRECTED 13 Inhaler 2    albuterol sulfate  (90 Base) MCG/ACT inhaler 2 puffs every 6 hours 1 Inhaler 5    meclofenamate (MECLOMEN) 100 MG capsule TAKE 1 CAPSULE BY MOUTH DAILY AS NEEDED 90 capsule 0    citalopram (CELEXA) 40 MG tablet TAKE 1 TABLET BY MOUTH DAILY 90 tablet 3    diltiazem (CARDIZEM) 60 MG tablet Take 60 mg by mouth 2 times daily       loratadine (CLARITIN) 10 MG tablet Take 10 mg by mouth daily      conjugated estrogens (PREMARIN) 0.625 MG/GM vaginal cream Place vaginally daily. 1 Tube 3    levothyroxine (SYNTHROID) 50 MCG tablet Take 50 mcg by mouth daily   3    vitamin D3 (CHOLECALCIFEROL) 1000 UNITS TABS tablet Take 4 tablets by mouth daily        No current facility-administered medications for this visit. Review of Systems:   Review of Systems   Constitutional: Negative. Negative for diaphoresis and fatigue. HENT: Negative. Eyes: Negative. Respiratory: Positive for chest tightness and shortness of breath. Negative for cough, wheezing and stridor. Cardiovascular: Positive for chest pain. Negative for palpitations and leg swelling. Gastrointestinal: Negative. Negative for blood in stool and nausea. Genitourinary: Negative. Musculoskeletal: Negative. Skin: Negative. Neurological: Negative. Negative for dizziness, syncope, weakness and light-headedness. Hematological: Negative. Psychiatric/Behavioral: Negative. Physical Examination:    /68 (Site: Left Upper Arm, Position: Sitting, Cuff Size: Large Adult)   Pulse 75   Resp 16   Ht 5' 5\" (1.651 m)   Wt 129 lb (58.5 kg)   LMP 01/09/1995   SpO2 98%   BMI 21.47 kg/m²    Physical Exam   Constitutional: She appears healthy. No distress. HENT:   Normal cephalic and Atraumatic   Eyes: Pupils are equal, round, and reactive to light. Neck: Normal range of motion and thyroid normal. Neck supple. No JVD present. No neck adenopathy. No thyromegaly present. Cardiovascular: Normal rate, regular rhythm, intact distal pulses and normal pulses. Murmur heard.   Pulses:       Carotid pulses are on the right side with bruit, and on the left side with bruit.  Pulmonary/Chest: Effort normal and breath sounds normal. She has no wheezes. She has no rales. She exhibits no tenderness. Abdominal: Soft. Bowel sounds are normal. There is no tenderness. Musculoskeletal: Normal range of motion. She exhibits no edema or tenderness. Neurological: She is alert and oriented to person, place, and time. Skin: Skin is warm. No cyanosis. Nails show no clubbing.        LABS:  CBC:   Lab Results   Component Value Date    WBC 4.1 02/05/2019    RBC 4.65 02/05/2019    HGB 14.7 02/05/2019    HCT 43.9 02/05/2019    MCV 94.4 02/05/2019    MCH 31.7 02/05/2019    MCHC 33.5 02/05/2019    RDW 13.8 02/05/2019     02/05/2019    MPV 9.6 10/09/2014     Lipids:  Lab Results   Component Value Date    CHOL 256 (H) 02/05/2019    CHOL 244 (H) 10/09/2014     Lab Results   Component Value Date    TRIG 116 02/05/2019    TRIG 159 10/09/2014     Lab Results   Component Value Date    HDL 70 (H) 02/05/2019    HDL 60 (H) 10/09/2014     Lab Results   Component Value Date    LDLCALC 163 (H) 02/05/2019    LDLCALC 152 (H) 10/09/2014     No results found for: LABVLDL, VLDL  No results found for: CHOLHDLRATIO  CMP:    Lab Results   Component Value Date     02/05/2019    K 4.3 02/05/2019     02/05/2019    CO2 27 02/05/2019    BUN 11 02/05/2019    CREATININE 0.68 02/05/2019    GFRAA >60.0 02/05/2019    LABGLOM >60.0 02/05/2019    GLUCOSE 96 02/05/2019    PROT 6.7 02/05/2019    LABALBU 4.2 02/05/2019    CALCIUM 9.6 02/05/2019    BILITOT 0.4 02/05/2019    ALKPHOS 98 02/05/2019    AST 17 02/05/2019    ALT 11 02/05/2019     BMP:    Lab Results   Component Value Date     02/05/2019    K 4.3 02/05/2019     02/05/2019    CO2 27 02/05/2019    BUN 11 02/05/2019    LABALBU 4.2 02/05/2019    CREATININE 0.68 02/05/2019    CALCIUM 9.6 02/05/2019    GFRAA >60.0 02/05/2019    LABGLOM >60.0 02/05/2019    GLUCOSE 96 02/05/2019     Magnesium:    Lab Results   Component Value Date    MG 2.1

## 2019-04-16 RX ORDER — CITALOPRAM 40 MG/1
40 TABLET ORAL DAILY
Qty: 90 TABLET | Refills: 3 | Status: SHIPPED | OUTPATIENT
Start: 2019-04-16 | End: 2020-09-08

## 2019-04-18 ENCOUNTER — HOSPITAL ENCOUNTER (OUTPATIENT)
Dept: CARDIAC CATH/INVASIVE PROCEDURES | Age: 69
Discharge: HOME | End: 2019-04-18
Attending: INTERNAL MEDICINE | Admitting: INTERNAL MEDICINE
Payer: MEDICARE

## 2019-04-18 VITALS
HEIGHT: 65 IN | OXYGEN SATURATION: 100 % | BODY MASS INDEX: 21.67 KG/M2 | TEMPERATURE: 98.2 F | SYSTOLIC BLOOD PRESSURE: 128 MMHG | HEART RATE: 58 BPM | DIASTOLIC BLOOD PRESSURE: 68 MMHG | WEIGHT: 130.07 LBS | RESPIRATION RATE: 15 BRPM

## 2019-04-18 PROCEDURE — 93458 L HRT ARTERY/VENTRICLE ANGIO: CPT | Performed by: INTERNAL MEDICINE

## 2019-04-18 PROCEDURE — 2709999900 HC NON-CHARGEABLE SUPPLY

## 2019-04-18 PROCEDURE — C1887 CATHETER, GUIDING: HCPCS

## 2019-04-18 PROCEDURE — C1894 INTRO/SHEATH, NON-LASER: HCPCS

## 2019-04-18 PROCEDURE — C1769 GUIDE WIRE: HCPCS

## 2019-04-18 PROCEDURE — 6360000002 HC RX W HCPCS

## 2019-04-18 PROCEDURE — 2500000003 HC RX 250 WO HCPCS

## 2019-04-18 RX ORDER — SODIUM CHLORIDE 0.9 % (FLUSH) 0.9 %
10 SYRINGE (ML) INJECTION PRN
Status: DISCONTINUED | OUTPATIENT
Start: 2019-04-18 | End: 2019-04-18 | Stop reason: HOSPADM

## 2019-04-18 RX ORDER — SODIUM CHLORIDE 450 MG/100ML
INJECTION, SOLUTION INTRAVENOUS CONTINUOUS
Status: DISCONTINUED | OUTPATIENT
Start: 2019-04-18 | End: 2019-04-18 | Stop reason: HOSPADM

## 2019-04-18 RX ORDER — NITROGLYCERIN 0.4 MG/1
0.4 TABLET SUBLINGUAL EVERY 5 MIN PRN
Status: DISCONTINUED | OUTPATIENT
Start: 2019-04-18 | End: 2019-04-18 | Stop reason: HOSPADM

## 2019-04-18 RX ORDER — ACETAMINOPHEN 325 MG/1
650 TABLET ORAL EVERY 4 HOURS PRN
Status: DISCONTINUED | OUTPATIENT
Start: 2019-04-18 | End: 2019-04-18 | Stop reason: HOSPADM

## 2019-04-18 RX ORDER — SODIUM CHLORIDE 0.9 % (FLUSH) 0.9 %
10 SYRINGE (ML) INJECTION EVERY 12 HOURS SCHEDULED
Status: DISCONTINUED | OUTPATIENT
Start: 2019-04-18 | End: 2019-04-18 | Stop reason: HOSPADM

## 2019-04-18 RX ORDER — ONDANSETRON 2 MG/ML
4 INJECTION INTRAMUSCULAR; INTRAVENOUS EVERY 6 HOURS PRN
Status: DISCONTINUED | OUTPATIENT
Start: 2019-04-18 | End: 2019-04-18 | Stop reason: HOSPADM

## 2019-04-18 RX ORDER — DIPHENHYDRAMINE HYDROCHLORIDE 50 MG/ML
50 INJECTION INTRAMUSCULAR; INTRAVENOUS ONCE
Status: DISCONTINUED | OUTPATIENT
Start: 2019-04-18 | End: 2019-04-18 | Stop reason: HOSPADM

## 2019-04-18 RX ORDER — SODIUM CHLORIDE 450 MG/100ML
75 INJECTION, SOLUTION INTRAVENOUS CONTINUOUS
Status: DISCONTINUED | OUTPATIENT
Start: 2019-04-18 | End: 2019-04-18 | Stop reason: HOSPADM

## 2019-04-18 NOTE — BRIEF OP NOTE
Brief Postoperative Note  ______________________________________________________________    Patient: Audrey Reis  YOB: 1950  MRN: [id number]   Section of Cardiology  Adult Brief Cardiac Cath Procedure Note        Procedure(s):  LHC, b/l coronary angio    Pre-operative Diagnosis:  angina    H&P Status: Completed and reviewed.      Post-operative Diagnosis:    MIld plaque LAD and RCA 20% range, EF 55 EDP 5    Findings:  See full report    Complications:  none    Primary Proceduralist:   Jenn Pickett MD

## 2019-04-18 NOTE — PROGRESS NOTES
Right wrist remains soft and dry. Removed saline locks intact. Will give discharge instructions and verbalize understanding.

## 2019-04-18 NOTE — PROGRESS NOTES
Patient has been resting in bed and has had crackers and a beverage. Family is at the bedside. Removed 2 cc of air from TR band.

## 2019-04-18 NOTE — PROGRESS NOTES
Patient will be brought back to pre post, identified, sign consents and given a copy of rights and responsibilities

## 2019-04-23 RX ORDER — FLUTICASONE PROPIONATE 50 MCG
SPRAY, SUSPENSION (ML) NASAL
Qty: 1 BOTTLE | Refills: 1 | Status: SHIPPED | OUTPATIENT
Start: 2019-04-23 | End: 2019-05-10 | Stop reason: SDUPTHER

## 2019-05-10 RX ORDER — FLUTICASONE PROPIONATE 50 MCG
SPRAY, SUSPENSION (ML) NASAL
Qty: 1 BOTTLE | Refills: 2 | Status: SHIPPED | OUTPATIENT
Start: 2019-05-10

## 2019-05-13 ENCOUNTER — OFFICE VISIT (OUTPATIENT)
Dept: CARDIOLOGY CLINIC | Age: 69
End: 2019-05-13
Payer: MEDICARE

## 2019-05-13 VITALS
RESPIRATION RATE: 16 BRPM | DIASTOLIC BLOOD PRESSURE: 68 MMHG | WEIGHT: 129.8 LBS | OXYGEN SATURATION: 98 % | SYSTOLIC BLOOD PRESSURE: 122 MMHG | BODY MASS INDEX: 21.6 KG/M2 | HEART RATE: 66 BPM

## 2019-05-13 DIAGNOSIS — I20.8 ANGINA EFFORT: ICD-10-CM

## 2019-05-13 DIAGNOSIS — R09.89 BILATERAL CAROTID BRUITS: ICD-10-CM

## 2019-05-13 DIAGNOSIS — E78.2 MIXED HYPERLIPIDEMIA: Primary | ICD-10-CM

## 2019-05-13 DIAGNOSIS — I10 ESSENTIAL HYPERTENSION: ICD-10-CM

## 2019-05-13 DIAGNOSIS — I25.10 CORONARY ARTERY DISEASE INVOLVING NATIVE CORONARY ARTERY OF NATIVE HEART WITHOUT ANGINA PECTORIS: ICD-10-CM

## 2019-05-13 PROCEDURE — 99214 OFFICE O/P EST MOD 30 MIN: CPT | Performed by: INTERNAL MEDICINE

## 2019-05-13 RX ORDER — DILTIAZEM HYDROCHLORIDE 60 MG/1
60 TABLET, FILM COATED ORAL 2 TIMES DAILY
Qty: 180 TABLET | Refills: 2 | Status: SHIPPED | OUTPATIENT
Start: 2019-05-13 | End: 2020-03-09 | Stop reason: SDUPTHER

## 2019-05-13 ASSESSMENT — ENCOUNTER SYMPTOMS
COUGH: 0
EYES NEGATIVE: 1
GASTROINTESTINAL NEGATIVE: 1
RESPIRATORY NEGATIVE: 1
CHEST TIGHTNESS: 0
BLOOD IN STOOL: 0
NAUSEA: 0
SHORTNESS OF BREATH: 0
WHEEZING: 0
STRIDOR: 0

## 2019-05-13 NOTE — PROGRESS NOTES
13 Inhaler 2    albuterol sulfate  (90 Base) MCG/ACT inhaler 2 puffs every 6 hours 1 Inhaler 5    meclofenamate (MECLOMEN) 100 MG capsule TAKE 1 CAPSULE BY MOUTH DAILY AS NEEDED 90 capsule 0    loratadine (CLARITIN) 10 MG tablet Take 10 mg by mouth daily      conjugated estrogens (PREMARIN) 0.625 MG/GM vaginal cream Place vaginally daily. 1 Tube 3    levothyroxine (SYNTHROID) 50 MCG tablet Take 50 mcg by mouth daily On Tuesday and Thursday; Saturday  she takes 2 tabs. On Monday,Wednesday,Friday she takes 2 1/2 tabs  3    vitamin D3 (CHOLECALCIFEROL) 1000 UNITS TABS tablet Take 4 tablets by mouth daily        No current facility-administered medications for this visit. Review of Systems:   Review of Systems   Constitutional: Negative. Negative for diaphoresis and fatigue. HENT: Negative. Eyes: Negative. Respiratory: Negative. Negative for cough, chest tightness, shortness of breath, wheezing and stridor. Cardiovascular: Positive for chest pain. Negative for palpitations and leg swelling. Gastrointestinal: Negative. Negative for blood in stool and nausea. Genitourinary: Negative. Musculoskeletal: Negative. Skin: Negative. Neurological: Negative. Negative for dizziness, syncope, weakness and light-headedness. Hematological: Negative. Psychiatric/Behavioral: Negative. Physical Examination:    /68 (Site: Right Upper Arm, Position: Sitting, Cuff Size: Medium Adult)   Pulse 66   Resp 16   Wt 129 lb 12.8 oz (58.9 kg)   LMP 01/09/1995   SpO2 98%   BMI 21.60 kg/m²    Physical Exam   Constitutional: She appears healthy. No distress. HENT:   Normal cephalic and Atraumatic   Eyes: Pupils are equal, round, and reactive to light. Neck: Normal range of motion and thyroid normal. Neck supple. No JVD present. No neck adenopathy. No thyromegaly present.    Cardiovascular: Normal rate, regular rhythm, normal heart sounds, intact distal pulses and normal pulses. Pulmonary/Chest: Effort normal and breath sounds normal. She has no wheezes. She has no rales. She exhibits no tenderness. Abdominal: Soft. Bowel sounds are normal. There is no tenderness. Musculoskeletal: Normal range of motion. She exhibits no edema or tenderness. Neurological: She is alert and oriented to person, place, and time. Skin: Skin is warm. No cyanosis. Nails show no clubbing.        LABS:  CBC:   Lab Results   Component Value Date    WBC 7.4 04/11/2019    RBC 4.62 04/11/2019    HGB 14.7 04/11/2019    HCT 43.7 04/11/2019    MCV 94.5 04/11/2019    MCH 31.9 04/11/2019    MCHC 33.7 04/11/2019    RDW 13.8 04/11/2019     04/11/2019    MPV 9.6 10/09/2014     Lipids:  Lab Results   Component Value Date    CHOL 256 (H) 02/05/2019    CHOL 244 (H) 10/09/2014     Lab Results   Component Value Date    TRIG 116 02/05/2019    TRIG 159 10/09/2014     Lab Results   Component Value Date    HDL 70 (H) 02/05/2019    HDL 60 (H) 10/09/2014     Lab Results   Component Value Date    LDLCALC 163 (H) 02/05/2019    LDLCALC 152 (H) 10/09/2014     No results found for: LABVLDL, VLDL  No results found for: CHOLHDLRATIO  CMP:    Lab Results   Component Value Date     04/11/2019    K 5.0 04/11/2019     04/11/2019    CO2 28 04/11/2019    BUN 19 04/11/2019    CREATININE 0.64 04/11/2019    GFRAA >60.0 04/11/2019    LABGLOM >60.0 04/11/2019    GLUCOSE 73 04/11/2019    PROT 6.7 02/05/2019    LABALBU 4.2 02/05/2019    CALCIUM 9.8 04/11/2019    BILITOT 0.4 02/05/2019    ALKPHOS 98 02/05/2019    AST 17 02/05/2019    ALT 11 02/05/2019     BMP:    Lab Results   Component Value Date     04/11/2019    K 5.0 04/11/2019     04/11/2019    CO2 28 04/11/2019    BUN 19 04/11/2019    LABALBU 4.2 02/05/2019    CREATININE 0.64 04/11/2019    CALCIUM 9.8 04/11/2019    GFRAA >60.0 04/11/2019    LABGLOM >60.0 04/11/2019    GLUCOSE 73 04/11/2019     Magnesium:    Lab Results   Component Value Date    MG 2.1 02/05/2019     TSH:  Lab Results   Component Value Date    TSH 1.600 03/27/2018       Patient Active Problem List   Diagnosis    Goiter    [facility]ch[facility]) ([facility]    GERD (gastroesophageal reflux disease)    Mixed hyperlipidemia    Hypothyroidism    Generalized anxiety disorder    Tobacco abuse    Intra-abdominal hematoma    Chest pain    Bilateral carotid bruits    Essential hypertension    Atrial fibrillation ([facility]    Coronary artery disease involving native coronary artery of native heart without angina pectoris    Hyperlipidemia    Bilateral carotid artery stenosis    Simple chronic bronchitis (HCC)    Angina effort ([facility]       Medications Discontinued During This Encounter   Medication Reason    pitavastatin (LIVALO) 1 MG TABS tablet REORDER    diltiazem (CARDIZEM) 60 MG tablet REORDER       Modified Medications    Modified Medication Previous Medication    DILTIAZEM (CARDIZEM) 60 MG TABLET diltiazem (CARDIZEM) 60 MG tablet       Take 1 tablet by mouth 2 times daily    Take 60 mg by mouth 2 times daily     PITAVASTATIN (LIVALO) 1 MG TABS TABLET pitavastatin (LIVALO) 1 MG TABS tablet       Take 0.5 tablets by mouth nightly    Take 0.5 tablets by mouth nightly       Orders Placed This Encounter   Medications    pitavastatin (LIVALO) 1 MG TABS tablet     Sig: Take 0.5 tablets by mouth nightly     Dispense:  30 tablet     Refill:  3    diltiazem (CARDIZEM) 60 MG tablet     Sig: Take 1 tablet by mouth 2 times daily     Dispense:  180 tablet     Refill:  2       Assessment/Plan:    1. Mixed hyperlipidemia     - pitavastatin (LIVALO) 1 MG TABS tablet; Take 0.5 tablets by mouth nightly  Dispense: 30 tablet; Refill: 3  - Lipid Panel; Future    2. Essential hypertension     - pitavastatin (LIVALO) 1 MG TABS tablet; Take 0.5 tablets by mouth nightly  Dispense: 30 tablet; Refill: 3  - CBC; Future  - Comprehensive Metabolic Panel; Future  - Magnesium; Future    3.  Coronary artery disease involving native coronary artery of native heart without angina pectoris   no angina        Counseling:  Heart Healthy Lifestyle, Stop Smoking, Take Precautions to Prevent Falls, Regular Exercise and Walk Daily    Return for Cardiovascular care. .      Electronically signed by Fauzia Greene MD on 5/13/2019 at 1:49 PM

## 2019-05-14 DIAGNOSIS — E78.2 MIXED HYPERLIPIDEMIA: ICD-10-CM

## 2019-05-14 DIAGNOSIS — I10 ESSENTIAL HYPERTENSION: ICD-10-CM

## 2019-05-14 DIAGNOSIS — F41.1 GENERALIZED ANXIETY DISORDER: ICD-10-CM

## 2019-05-14 DIAGNOSIS — G47.00 INSOMNIA, UNSPECIFIED TYPE: ICD-10-CM

## 2019-05-14 LAB
ALBUMIN SERPL-MCNC: 4.7 G/DL (ref 3.5–4.6)
ALP BLD-CCNC: 100 U/L (ref 40–130)
ALT SERPL-CCNC: 11 U/L (ref 0–33)
ANION GAP SERPL CALCULATED.3IONS-SCNC: 15 MEQ/L (ref 9–15)
AST SERPL-CCNC: 17 U/L (ref 0–35)
BILIRUB SERPL-MCNC: 0.3 MG/DL (ref 0.2–0.7)
BUN BLDV-MCNC: 17 MG/DL (ref 8–23)
CALCIUM SERPL-MCNC: 9.4 MG/DL (ref 8.5–9.9)
CHLORIDE BLD-SCNC: 100 MEQ/L (ref 95–107)
CHOLESTEROL, TOTAL: 219 MG/DL (ref 0–199)
CO2: 25 MEQ/L (ref 20–31)
CREAT SERPL-MCNC: 0.74 MG/DL (ref 0.5–0.9)
GFR AFRICAN AMERICAN: >60
GFR NON-AFRICAN AMERICAN: >60
GLOBULIN: 2.2 G/DL (ref 2.3–3.5)
GLUCOSE BLD-MCNC: 87 MG/DL (ref 70–99)
HCT VFR BLD CALC: 46.1 % (ref 37–47)
HDLC SERPL-MCNC: 76 MG/DL (ref 40–59)
HEMOGLOBIN: 15.2 G/DL (ref 12–16)
LDL CHOLESTEROL CALCULATED: 122 MG/DL (ref 0–129)
MAGNESIUM: 2.1 MG/DL (ref 1.7–2.4)
MCH RBC QN AUTO: 30.7 PG (ref 27–31.3)
MCHC RBC AUTO-ENTMCNC: 33 % (ref 33–37)
MCV RBC AUTO: 93 FL (ref 82–100)
PDW BLD-RTO: 14.4 % (ref 11.5–14.5)
PLATELET # BLD: 193 K/UL (ref 130–400)
POTASSIUM SERPL-SCNC: 4.5 MEQ/L (ref 3.4–4.9)
RBC # BLD: 4.95 M/UL (ref 4.2–5.4)
SODIUM BLD-SCNC: 140 MEQ/L (ref 135–144)
TOTAL PROTEIN: 6.9 G/DL (ref 6.3–8)
TRIGL SERPL-MCNC: 104 MG/DL (ref 0–150)
WBC # BLD: 4.6 K/UL (ref 4.8–10.8)

## 2019-05-14 RX ORDER — ALPRAZOLAM 0.5 MG/1
TABLET ORAL
Qty: 30 TABLET | Refills: 2 | Status: SHIPPED | OUTPATIENT
Start: 2019-05-14 | End: 2019-08-12 | Stop reason: SDUPTHER

## 2019-05-14 NOTE — TELEPHONE ENCOUNTER
LAST OV 04/01/2019 w/ Bowen  LAST OV 01/17/2019 w/ Abiel  LAST Rx 02/06/2019 Disp #30 R #2  NEXT APPT 09/16/2019

## 2019-07-01 ENCOUNTER — OFFICE VISIT (OUTPATIENT)
Dept: FAMILY MEDICINE CLINIC | Age: 69
End: 2019-07-01
Payer: MEDICARE

## 2019-07-01 VITALS
DIASTOLIC BLOOD PRESSURE: 70 MMHG | HEART RATE: 68 BPM | WEIGHT: 124.4 LBS | SYSTOLIC BLOOD PRESSURE: 110 MMHG | OXYGEN SATURATION: 92 % | BODY MASS INDEX: 20.73 KG/M2 | HEIGHT: 65 IN | TEMPERATURE: 96.8 F | RESPIRATION RATE: 18 BRPM

## 2019-07-01 DIAGNOSIS — J01.21 ACUTE RECURRENT ETHMOIDAL SINUSITIS: ICD-10-CM

## 2019-07-01 DIAGNOSIS — J01.00 ACUTE NON-RECURRENT MAXILLARY SINUSITIS: Primary | ICD-10-CM

## 2019-07-01 DIAGNOSIS — I65.23 BILATERAL CAROTID ARTERY STENOSIS: ICD-10-CM

## 2019-07-01 DIAGNOSIS — J42 CHRONIC BRONCHITIS, UNSPECIFIED CHRONIC BRONCHITIS TYPE (HCC): ICD-10-CM

## 2019-07-01 PROCEDURE — 99214 OFFICE O/P EST MOD 30 MIN: CPT | Performed by: PHYSICIAN ASSISTANT

## 2019-07-01 RX ORDER — PREDNISONE 20 MG/1
20 TABLET ORAL 2 TIMES DAILY
Qty: 10 TABLET | Refills: 0 | Status: SHIPPED | OUTPATIENT
Start: 2019-07-01 | End: 2019-07-06

## 2019-07-01 RX ORDER — DOXYCYCLINE HYCLATE 100 MG
100 TABLET ORAL 2 TIMES DAILY
Qty: 20 TABLET | Refills: 0 | Status: SHIPPED | OUTPATIENT
Start: 2019-07-01 | End: 2020-01-21 | Stop reason: SDUPTHER

## 2019-07-01 RX ORDER — BENZONATATE 200 MG/1
200 CAPSULE ORAL 3 TIMES DAILY PRN
Qty: 30 CAPSULE | Refills: 0 | Status: SHIPPED | OUTPATIENT
Start: 2019-07-01 | End: 2019-07-08

## 2019-07-01 ASSESSMENT — ENCOUNTER SYMPTOMS
SHORTNESS OF BREATH: 1
WHEEZING: 1
COUGH: 1

## 2019-07-01 NOTE — PROGRESS NOTES
°C)   TempSrc: Oral   SpO2: 92%   Weight: 124 lb 6.4 oz (56.4 kg)   Height: 5' 5\" (1.651 m)     Physical Exam   Constitutional: She is oriented to person, place, and time. She appears well-developed and well-nourished. HENT:   Head: Normocephalic and atraumatic. Nose: Mucosal edema and sinus tenderness present. Mouth/Throat: Oropharynx is clear and moist.   Eyes: Pupils are equal, round, and reactive to light. Conjunctivae and EOM are normal.   Neck: Normal range of motion. Neck supple. Cardiovascular: Normal rate, regular rhythm and normal heart sounds. Pulmonary/Chest: She has wheezes. Decreased breath sounds throughout   Abdominal: Soft. Bowel sounds are normal. She exhibits no mass. There is no tenderness. There is no guarding. Musculoskeletal: She exhibits no edema. Neurological: She is alert and oriented to person, place, and time. Skin: Skin is warm and dry. Psychiatric: She has a normal mood and affect. Assessment & Plan    Diagnosis Orders   1. Acute non-recurrent maxillary sinusitis  doxycycline hyclate (VIBRA-TABS) 100 MG tablet    predniSONE (DELTASONE) 20 MG tablet   2. Acute recurrent ethmoidal sinusitis     3. Chronic bronchitis, unspecified chronic bronchitis type ([facility]     4. Bilateral carotid artery stenosis           No orders of the defined types were placed in this encounter.     Orders Placed This Encounter   Medications    doxycycline hyclate (VIBRA-TABS) 100 MG tablet     Sig: Take 1 tablet by mouth 2 times daily for 10 days     Dispense:  20 tablet     Refill:  0    benzonatate (TESSALON) 200 MG capsule     Sig: Take 1 capsule by mouth 3 times daily as needed for Cough     Dispense:  30 capsule     Refill:  0    predniSONE (DELTASONE) 20 MG tablet     Sig: Take 1 tablet by mouth 2 times daily for 5 days     Dispense:  10 tablet     Refill:  0     Medications Discontinued During This Encounter   Medication Reason    predniSONE (DELTASONE) 20 MG tablet REORDER

## 2019-07-15 DIAGNOSIS — J44.1 COPD EXACERBATION (HCC): ICD-10-CM

## 2019-08-12 DIAGNOSIS — F41.1 GENERALIZED ANXIETY DISORDER: ICD-10-CM

## 2019-08-12 DIAGNOSIS — G47.00 INSOMNIA, UNSPECIFIED TYPE: ICD-10-CM

## 2019-08-12 RX ORDER — ALPRAZOLAM 0.5 MG/1
TABLET ORAL
Qty: 30 TABLET | Refills: 0 | Status: SHIPPED | OUTPATIENT
Start: 2019-08-12 | End: 2019-08-15

## 2019-08-15 ENCOUNTER — TELEPHONE (OUTPATIENT)
Dept: FAMILY MEDICINE CLINIC | Age: 69
End: 2019-08-15

## 2019-08-15 DIAGNOSIS — G47.00 INSOMNIA, UNSPECIFIED TYPE: ICD-10-CM

## 2019-08-15 DIAGNOSIS — F41.1 GENERALIZED ANXIETY DISORDER: ICD-10-CM

## 2019-08-15 RX ORDER — ALPRAZOLAM 0.5 MG/1
TABLET ORAL
Qty: 30 TABLET | Refills: 2
Start: 2019-08-15 | End: 2019-09-12 | Stop reason: SDUPTHER

## 2019-08-15 NOTE — TELEPHONE ENCOUNTER
I changed the way the prescription reads in the chart hopefully she will be able to get 2 additional refills with her next order. Sorry for the problem.

## 2019-09-12 DIAGNOSIS — G47.00 INSOMNIA, UNSPECIFIED TYPE: ICD-10-CM

## 2019-09-12 DIAGNOSIS — F41.1 GENERALIZED ANXIETY DISORDER: ICD-10-CM

## 2019-09-12 RX ORDER — ALPRAZOLAM 0.5 MG/1
TABLET ORAL
Qty: 30 TABLET | Refills: 0 | Status: SHIPPED | OUTPATIENT
Start: 2019-09-12 | End: 2019-10-14 | Stop reason: SDUPTHER

## 2019-09-12 NOTE — TELEPHONE ENCOUNTER
Ephraim Chawla needs this again when Dr Latoya Lucia is out. Last time there was an issue because you were kind enough to give her 30 days until Dr Latoya Lucia came back, however it was in a pile somewhere and did not get signed or sent up front until Friday the 16th. So, that was a mess. You will see a rx in her med list from Dr Latoya Lucia 8/15/19 #30 w/ 2 refills. She never pick that one up because she did eventually get the one you wrote her on 8/12/19,  #30 with no refills  I see that in her media when she signed for it. She knows this is not your fault. I am trying to help you make sense of this so you have no questions. Thanks. Sorry for the book.

## 2019-09-16 ENCOUNTER — OFFICE VISIT (OUTPATIENT)
Dept: CARDIOLOGY CLINIC | Age: 69
End: 2019-09-16
Payer: MEDICARE

## 2019-09-16 ENCOUNTER — CLINICAL DOCUMENTATION (OUTPATIENT)
Dept: CARDIOLOGY CLINIC | Age: 69
End: 2019-09-16

## 2019-09-16 VITALS
HEART RATE: 81 BPM | DIASTOLIC BLOOD PRESSURE: 78 MMHG | RESPIRATION RATE: 16 BRPM | SYSTOLIC BLOOD PRESSURE: 112 MMHG | BODY MASS INDEX: 20.57 KG/M2 | OXYGEN SATURATION: 92 % | WEIGHT: 123.6 LBS

## 2019-09-16 DIAGNOSIS — I25.10 CORONARY ARTERY DISEASE INVOLVING NATIVE CORONARY ARTERY OF NATIVE HEART WITHOUT ANGINA PECTORIS: ICD-10-CM

## 2019-09-16 DIAGNOSIS — I48.91 ATRIAL FIBRILLATION, UNSPECIFIED TYPE (HCC): ICD-10-CM

## 2019-09-16 DIAGNOSIS — I10 ESSENTIAL HYPERTENSION: Primary | ICD-10-CM

## 2019-09-16 DIAGNOSIS — I10 ESSENTIAL HYPERTENSION: ICD-10-CM

## 2019-09-16 LAB — TSH SERPL DL<=0.05 MIU/L-ACNC: 3.19 UIU/ML (ref 0.44–3.86)

## 2019-09-16 PROCEDURE — 99214 OFFICE O/P EST MOD 30 MIN: CPT | Performed by: INTERNAL MEDICINE

## 2019-09-16 RX ORDER — FEXOFENADINE HCL 180 MG/1
180 TABLET ORAL DAILY
COMMUNITY
End: 2022-10-10 | Stop reason: ALTCHOICE

## 2019-09-16 ASSESSMENT — ENCOUNTER SYMPTOMS
NAUSEA: 0
EYES NEGATIVE: 1
GASTROINTESTINAL NEGATIVE: 1
COUGH: 0
STRIDOR: 0
SHORTNESS OF BREATH: 0
RESPIRATORY NEGATIVE: 1
CHEST TIGHTNESS: 0
BLOOD IN STOOL: 0
WHEEZING: 0

## 2019-09-20 ENCOUNTER — TELEPHONE (OUTPATIENT)
Dept: CARDIOLOGY CLINIC | Age: 69
End: 2019-09-20

## 2019-09-20 ENCOUNTER — TELEPHONE (OUTPATIENT)
Dept: FAMILY MEDICINE CLINIC | Age: 69
End: 2019-09-20

## 2019-09-23 DIAGNOSIS — Z87.891 PERSONAL HISTORY OF NICOTINE DEPENDENCE: ICD-10-CM

## 2019-09-23 DIAGNOSIS — E78.2 MIXED HYPERLIPIDEMIA: Primary | ICD-10-CM

## 2019-09-23 DIAGNOSIS — Z72.0 TOBACCO ABUSE: Primary | ICD-10-CM

## 2019-09-27 ENCOUNTER — TELEPHONE (OUTPATIENT)
Dept: CASE MANAGEMENT | Age: 69
End: 2019-09-27

## 2019-10-04 ENCOUNTER — HOSPITAL ENCOUNTER (OUTPATIENT)
Dept: CT IMAGING | Age: 69
Discharge: HOME | End: 2019-10-06
Payer: MEDICARE

## 2019-10-04 DIAGNOSIS — Z72.0 TOBACCO ABUSE: ICD-10-CM

## 2019-10-04 DIAGNOSIS — Z87.891 PERSONAL HISTORY OF NICOTINE DEPENDENCE: ICD-10-CM

## 2019-10-04 PROCEDURE — G0297 LDCT FOR LUNG CA SCREEN: HCPCS

## 2019-10-07 ENCOUNTER — TELEPHONE (OUTPATIENT)
Dept: FAMILY MEDICINE CLINIC | Age: 69
End: 2019-10-07

## 2019-10-07 ENCOUNTER — OFFICE VISIT (OUTPATIENT)
Dept: FAMILY MEDICINE CLINIC | Age: 69
End: 2019-10-07
Payer: MEDICARE

## 2019-10-07 VITALS
SYSTOLIC BLOOD PRESSURE: 138 MMHG | HEART RATE: 62 BPM | WEIGHT: 125 LBS | HEIGHT: 65 IN | TEMPERATURE: 97.4 F | DIASTOLIC BLOOD PRESSURE: 88 MMHG | OXYGEN SATURATION: 92 % | BODY MASS INDEX: 20.83 KG/M2 | RESPIRATION RATE: 16 BRPM

## 2019-10-07 DIAGNOSIS — R91.1 LUNG NODULE, SOLITARY: Primary | ICD-10-CM

## 2019-10-07 DIAGNOSIS — J20.9 ACUTE BRONCHITIS, UNSPECIFIED ORGANISM: Primary | ICD-10-CM

## 2019-10-07 DIAGNOSIS — R91.1 LUNG NODULE, SOLITARY: ICD-10-CM

## 2019-10-07 PROCEDURE — 99213 OFFICE O/P EST LOW 20 MIN: CPT | Performed by: FAMILY MEDICINE

## 2019-10-07 RX ORDER — AMOXICILLIN AND CLAVULANATE POTASSIUM 875; 125 MG/1; MG/1
1 TABLET, FILM COATED ORAL 2 TIMES DAILY
Qty: 20 TABLET | Refills: 0 | Status: SHIPPED | OUTPATIENT
Start: 2019-10-07 | End: 2020-01-13

## 2019-10-12 NOTE — TELEPHONE ENCOUNTER
requesting medication refill.      Rx requested:  Requested Prescriptions     Pending Prescriptions Disp Refills    ALPRAZolam (XANAX) 0.5 MG tablet 30 tablet 0     Sig: TAKE 1 TABLET BY MOUTH NIGHTLY       Last Office Visit:   10/7/2019    Last Tox screen:    11/19/18    Last Medication contract:    11/19/18    Next Visit Date:  Future Appointments   Date Time Provider [facility]   1/13/2020  1:15 PM Tala Devlin, [id number] [id number]   4/6/2020 10:15 AM Billi Sever, MD [address] [place] [address] 7

## 2019-10-14 RX ORDER — ALPRAZOLAM 0.5 MG/1
TABLET ORAL
Qty: 30 TABLET | Refills: 2 | Status: SHIPPED | OUTPATIENT
Start: 2019-10-14 | End: 2020-01-07 | Stop reason: SDUPTHER

## 2019-10-30 ENCOUNTER — TELEPHONE (OUTPATIENT)
Dept: CARDIOLOGY CLINIC | Age: 69
End: 2019-10-30

## 2019-11-13 DIAGNOSIS — E78.2 MIXED HYPERLIPIDEMIA: ICD-10-CM

## 2019-11-13 LAB
CHOLESTEROL, TOTAL: 138 MG/DL (ref 0–199)
HDLC SERPL-MCNC: 73 MG/DL (ref 40–59)
LDL CHOLESTEROL CALCULATED: 33 MG/DL (ref 0–129)
TRIGL SERPL-MCNC: 160 MG/DL (ref 0–150)

## 2019-11-27 ENCOUNTER — TELEPHONE (OUTPATIENT)
Dept: CARDIOLOGY CLINIC | Age: 69
End: 2019-11-27

## 2020-01-07 RX ORDER — ALPRAZOLAM 0.5 MG/1
TABLET ORAL
Qty: 30 TABLET | Refills: 2 | Status: SHIPPED | OUTPATIENT
Start: 2020-01-07 | End: 2020-04-06 | Stop reason: SDUPTHER

## 2020-01-10 DIAGNOSIS — Z79.899 ENCOUNTER FOR LONG-TERM CURRENT USE OF MEDICATION: ICD-10-CM

## 2020-01-10 LAB
AMPHETAMINE SCREEN, URINE: NORMAL
BARBITURATE SCREEN URINE: NORMAL
BENZODIAZEPINE SCREEN, URINE: NORMAL
CANNABINOID SCREEN URINE: NORMAL
COCAINE METABOLITE SCREEN URINE: NORMAL
Lab: NORMAL
METHADONE SCREEN, URINE: NORMAL
OPIATE SCREEN URINE: NORMAL
OXYCODONE URINE: NORMAL
PHENCYCLIDINE SCREEN URINE: NORMAL
PROPOXYPHENE SCREEN: NORMAL

## 2020-01-13 ENCOUNTER — OFFICE VISIT (OUTPATIENT)
Dept: CARDIOLOGY CLINIC | Age: 70
End: 2020-01-13
Payer: COMMERCIAL

## 2020-01-13 VITALS
HEART RATE: 52 BPM | DIASTOLIC BLOOD PRESSURE: 76 MMHG | RESPIRATION RATE: 16 BRPM | OXYGEN SATURATION: 97 % | BODY MASS INDEX: 22.13 KG/M2 | WEIGHT: 133 LBS | SYSTOLIC BLOOD PRESSURE: 118 MMHG

## 2020-01-13 PROCEDURE — 93000 ELECTROCARDIOGRAM COMPLETE: CPT | Performed by: INTERNAL MEDICINE

## 2020-01-13 PROCEDURE — 99214 OFFICE O/P EST MOD 30 MIN: CPT | Performed by: INTERNAL MEDICINE

## 2020-01-13 ASSESSMENT — ENCOUNTER SYMPTOMS
RESPIRATORY NEGATIVE: 1
EYES NEGATIVE: 1
GASTROINTESTINAL NEGATIVE: 1
NAUSEA: 0
BLOOD IN STOOL: 0
WHEEZING: 0
SHORTNESS OF BREATH: 0
STRIDOR: 0
COUGH: 0
CHEST TIGHTNESS: 0

## 2020-01-13 NOTE — PROGRESS NOTES
Highest education level: None   Occupational History    None   Social Needs    Financial resource strain: None    Food insecurity:     Worry: None     Inability: None    Transportation needs:     Medical: None     Non-medical: None   Tobacco Use    Smoking status: Current Some Day Smoker     Packs/day: 1.00     Years: 45.00     Pack years: 45.00     Types: Cigarettes     Start date: 1966    Smokeless tobacco: Never Used    Tobacco comment: 9/23/19 now smoking 1 cig twice per day, had quit for 7 years at one point   Substance and Sexual Activity    Alcohol use: Yes    Drug use: No    Sexual activity: Yes     Partners: Male   Lifestyle    Physical activity:     Days per week: None     Minutes per session: None    Stress: None   Relationships    Social connections:     Talks on phone: None     Gets together: None     Attends Religion service: None     Active member of club or organization: None     Attends meetings of clubs or organizations: None     Relationship status: None    Intimate partner violence:     Fear of current or ex partner: None     Emotionally abused: None     Physically abused: None     Forced sexual activity: None   Other Topics Concern    None   Social History Narrative    None       Allergies   Allergen Reactions    Morphine      Tremor    Cefdinir Itching and Other (See Comments)    Fosamax [Alendronate] Other (See Comments)     Muscle aches      Pantoprazole Other (See Comments)     abdominal cramping, uncontrolled BM.  Simvastatin      muscle soreness.     Zetia [Ezetimibe]     Zocor [Simvastatin - High Dose]     Adhesive Tape Rash       Current Outpatient Medications   Medication Sig Dispense Refill    ALPRAZolam (XANAX) 0.5 MG tablet TAKE 1 TABLET BY MOUTH NIGHTLY as needed 30 tablet 2    fexofenadine (ALLEGRA) 180 MG tablet Take 180 mg by mouth daily      mometasone-formoterol (DULERA) 200-5 MCG/ACT inhaler INHALE 1 PUFF EVERY 12 HOURS AS DIRECTED 1 Inhaler 5    05/14/2019    AST 17 05/14/2019    ALT 11 05/14/2019     BMP:    Lab Results   Component Value Date     05/14/2019    K 4.5 05/14/2019     05/14/2019    CO2 25 05/14/2019    BUN 17 05/14/2019    LABALBU 4.7 05/14/2019    CREATININE 0.74 05/14/2019    CALCIUM 9.4 05/14/2019    GFRAA >60.0 05/14/2019    LABGLOM >60.0 05/14/2019    GLUCOSE 87 05/14/2019     Magnesium:    Lab Results   Component Value Date    MG 2.1 05/14/2019     TSH:  Lab Results   Component Value Date    TSH 3.190 09/16/2019       Patient Active Problem List   Diagnosis    Goiter    COPD [facility]chronic obstructive pulmonary disease) ([facility]    GERD (gastroesophageal reflux disease)    Mixed hyperlipidemia    Hypothyroidism    Generalized anxiety disorder    Tobacco abuse    Intra-abdominal hematoma    Chest pain    Bilateral carotid bruits    Essential hypertension    Atrial fibrillation ([facility]    Coronary artery disease involving native coronary artery of native heart without angina pectoris    Hyperlipidemia    Bilateral carotid artery stenosis    Simple chronic bronchitis (HCC)    Angina effort       Medications Discontinued During This Encounter   Medication Reason    amoxicillin-clavulanate (AUGMENTIN) 875-125 MG per tablet LIST CLEANUP    Evolocumab (REPATHA) 140 MG/ML SOSY LIST CLEANUP       Modified Medications    No medications on file       No orders of the defined types were placed in this encounter. Assessment/Plan:    1. Essential hypertension  Stable     2. Coronary artery disease involving native coronary artery of native heart without angina pectoris  No angina    3. Carotid - mild     4. R Lung nodule- repeat CT chesst scheduled for 6 mo. If worse at tthat time [facility] eval     Counseling:  Heart Healthy Lifestyle, Stop Smoking, Low Salt Diet, Take Precautions to Prevent Falls, Regular Exercise and Walk Daily    Return in about 4 months (around 5/13/2020) for Cardiovascular care. .      Electronically signed by Claudia Dean MD on 1/13/2020 at 1:51 PM

## 2020-01-21 ENCOUNTER — OFFICE VISIT (OUTPATIENT)
Dept: FAMILY MEDICINE CLINIC | Age: 70
End: 2020-01-21
Payer: COMMERCIAL

## 2020-01-21 VITALS
BODY MASS INDEX: 21.66 KG/M2 | RESPIRATION RATE: 12 BRPM | OXYGEN SATURATION: 94 % | HEART RATE: 70 BPM | WEIGHT: 130 LBS | SYSTOLIC BLOOD PRESSURE: 114 MMHG | HEIGHT: 65 IN | TEMPERATURE: 97.9 F | DIASTOLIC BLOOD PRESSURE: 78 MMHG

## 2020-01-21 PROCEDURE — 99213 OFFICE O/P EST LOW 20 MIN: CPT | Performed by: PHYSICIAN ASSISTANT

## 2020-01-21 RX ORDER — PREDNISONE 20 MG/1
20 TABLET ORAL 2 TIMES DAILY
Qty: 10 TABLET | Refills: 0 | Status: SHIPPED | OUTPATIENT
Start: 2020-01-21 | End: 2020-01-26

## 2020-01-21 RX ORDER — DOXYCYCLINE HYCLATE 100 MG
100 TABLET ORAL 2 TIMES DAILY
Qty: 20 TABLET | Refills: 0 | Status: SHIPPED | OUTPATIENT
Start: 2020-01-21 | End: 2020-01-31

## 2020-01-21 ASSESSMENT — ENCOUNTER SYMPTOMS
STRIDOR: 0
WHEEZING: 0
SINUS PRESSURE: 0
SHORTNESS OF BREATH: 1
COUGH: 1
SINUS PAIN: 0
RHINORRHEA: 1

## 2020-01-21 ASSESSMENT — PATIENT HEALTH QUESTIONNAIRE - PHQ9
1. LITTLE INTEREST OR PLEASURE IN DOING THINGS: 0
SUM OF ALL RESPONSES TO PHQ QUESTIONS 1-9: 0
SUM OF ALL RESPONSES TO PHQ QUESTIONS 1-9: 0
2. FEELING DOWN, DEPRESSED OR HOPELESS: 0
SUM OF ALL RESPONSES TO PHQ9 QUESTIONS 1 & 2: 0

## 2020-01-21 NOTE — PROGRESS NOTES
2020     Gabriel Alvarenga (:  1950) is a 71 y.o. female, here for evaluation of the following medical concerns: Cough productive yellow mucus for the past week    HPI  Patient is here with complaint of cough productive of yellow mucus shortness of breath for the past week she does feel mildly improved today  She reports that she had new finding of a lung nodule and is scheduled follow-up with her PCP to repeat her CT scan in another 2 to 3 months    Review of Systems   Constitutional: Positive for fatigue. Negative for chills and fever. HENT: Positive for postnasal drip and rhinorrhea. Negative for sinus pressure and sinus pain. Respiratory: Positive for cough and shortness of breath. Negative for wheezing and stridor. Allergic/Immunologic: Positive for environmental allergies. All other systems reviewed and are negative. Prior to Visit Medications    Medication Sig Taking?  Authorizing Provider   ALPRAZolam (XANAX) 0.5 MG tablet TAKE 1 TABLET BY MOUTH NIGHTLY as needed  Wayne Fitzpatrick MD   fexofenadine (ALLEGRA) 180 MG tablet Take 180 mg by mouth daily  Historical Provider, MD   mometasone-formoterol (DULERA) 200-5 MCG/ACT inhaler INHALE 1 PUFF EVERY 12 HOURS AS DIRECTED  Wayne Fitzpatrick MD   pitavastatin (LIVALO) 1 MG TABS tablet Take 0.5 tablets by mouth nightly  Darryle Buttner, MD   diltiazem (CARDIZEM) 60 MG tablet Take 1 tablet by mouth 2 times daily  Darryle Buttner, MD   fluticasone (FLONASE) 50 MCG/ACT nasal spray USE 1 SPRAY INTO EACH NOSTRIL ONCE DAILY  Mini Wiseman PA-C   citalopram (CELEXA) 40 MG tablet TAKE 1 TABLET BY MOUTH DAILY  Wayne Fitzpatrick MD   aspirin 81 MG tablet Take 1 tablet by mouth daily With Mio Cardenas MD   albuterol sulfate  (90 Base) MCG/ACT inhaler 2 puffs every 6 hours  Wayne Fitzpatrick MD   meclofenamate (MECLOMEN) 100 MG capsule TAKE 1 CAPSULE BY MOUTH DAILY AS NEEDED  Wayne Fitzpatrick MD   conjugated estrogens (PREMARIN) 0.625 MG/GM vaginal cream Place vaginally daily. Kalpana Calvert MD   levothyroxine (SYNTHROID) 50 MCG tablet Take 50 mcg by mouth daily On Tuesday and Thursday; Saturday  she takes 2 tabs. On Monday,Wednesday,Friday she takes 2 1/2 tabs  Historical Provider, MD   vitamin D3 (CHOLECALCIFEROL) 1000 UNITS TABS tablet Take 4 tablets by mouth daily   Historical Provider, MD        Social History     Tobacco Use    Smoking status: Current Some Day Smoker     Packs/day: 1.00     Years: 45.00     Pack years: 45.00     Types: Cigarettes     Start date: 1966    Smokeless tobacco: Never Used    Tobacco comment: 9/23/19 now smoking 1 cig twice per day, had quit for 7 years at one point   Substance Use Topics    Alcohol use: Yes        Vitals:    01/21/20 1206   BP: 114/78   Pulse: 70   Resp: 12   Temp: 97.9 °F (36.6 °C)   SpO2: 94%   Weight: 130 lb (59 kg)   Height: 5' 5\" (1.651 m)     Estimated body mass index is 21.63 kg/m² as calculated from the following:    Height as of this encounter: 5' 5\" (1.651 m). Weight as of this encounter: 130 lb (59 kg). Physical Exam  Constitutional:       General: She is not in acute distress. Appearance: Normal appearance. She is not ill-appearing. HENT:      Head: Normocephalic and atraumatic. Right Ear: External ear normal.      Left Ear: External ear normal.      Nose: Congestion present. Mouth/Throat:      Mouth: Mucous membranes are moist.      Pharynx: Oropharynx is clear. Eyes:      Extraocular Movements: Extraocular movements intact. Conjunctiva/sclera: Conjunctivae normal.      Pupils: Pupils are equal, round, and reactive to light. Neck:      Musculoskeletal: Normal range of motion and neck supple. Cardiovascular:      Rate and Rhythm: Normal rate and regular rhythm. Pulmonary:      Effort: Pulmonary effort is normal. No respiratory distress. Breath sounds: Wheezing and rhonchi present. Lymphadenopathy:      Cervical: No cervical adenopathy. Skin:     General: Skin is warm and dry. Neurological:      General: No focal deficit present. Mental Status: She is alert and oriented to person, place, and time. Assessment & Plan    Diagnosis Orders   1. COPD with acute bronchitis (HCC)  doxycycline hyclate (VIBRA-TABS) 100 MG tablet    predniSONE (DELTASONE) 20 MG tablet         No orders of the defined types were placed in this encounter. Orders Placed This Encounter   Medications    doxycycline hyclate (VIBRA-TABS) 100 MG tablet     Sig: Take 1 tablet by mouth 2 times daily for 10 days     Dispense:  20 tablet     Refill:  0    predniSONE (DELTASONE) 20 MG tablet     Sig: Take 1 tablet by mouth 2 times daily for 5 days     Dispense:  10 tablet     Refill:  0     Medications Discontinued During This Encounter   Medication Reason    doxycycline hyclate (VIBRA-TABS) 100 MG tablet REORDER     Return for follow up with your PCP as directed. Mini Wiseman PA-C           Assessment & Plan    Diagnosis Orders   1. COPD with acute bronchitis (HCC)  doxycycline hyclate (VIBRA-TABS) 100 MG tablet         No orders of the defined types were placed in this encounter. No orders of the defined types were placed in this encounter. There are no discontinued medications. Return for follow up with your PCP as directed.     Mini Wiseman PA-C

## 2020-02-05 ENCOUNTER — OFFICE VISIT (OUTPATIENT)
Dept: FAMILY MEDICINE CLINIC | Age: 70
End: 2020-02-05
Payer: COMMERCIAL

## 2020-02-05 VITALS
RESPIRATION RATE: 16 BRPM | OXYGEN SATURATION: 96 % | BODY MASS INDEX: 21.66 KG/M2 | TEMPERATURE: 98 F | SYSTOLIC BLOOD PRESSURE: 110 MMHG | HEART RATE: 54 BPM | WEIGHT: 130 LBS | DIASTOLIC BLOOD PRESSURE: 60 MMHG | HEIGHT: 65 IN

## 2020-02-05 PROCEDURE — 99214 OFFICE O/P EST MOD 30 MIN: CPT | Performed by: FAMILY MEDICINE

## 2020-02-05 RX ORDER — SULFAMETHOXAZOLE AND TRIMETHOPRIM 800; 160 MG/1; MG/1
1 TABLET ORAL 2 TIMES DAILY
Qty: 20 TABLET | Refills: 0 | Status: SHIPPED | OUTPATIENT
Start: 2020-02-05 | End: 2020-02-15

## 2020-02-05 RX ORDER — CLINDAMYCIN HYDROCHLORIDE 300 MG/1
300 CAPSULE ORAL 3 TIMES DAILY
Qty: 30 CAPSULE | Refills: 0 | Status: SHIPPED
Start: 2020-02-05 | End: 2020-02-05 | Stop reason: SINTOL

## 2020-02-05 NOTE — PROGRESS NOTES
Patient: Leonie Sams    YOB: 1950    Date: 2/5/20    Chief Complaint   Patient presents with    Cough     follow up. She complains of cough with white sputum, chest congestion , wheezing and nasal drainage. Patient Active Problem List    Diagnosis Date Noted    Angina effort      Priority: High     Overview Note:     Replacing deprecated diagnoses      Simple chronic bronchitis ([facility] [address] 04/02/2019    Atrial fibrillation ([facility] [address] 03/28/2019    Coronary artery disease involving native coronary artery of native heart without angina pectoris 03/28/2019    Hyperlipidemia 03/28/2019    Bilateral carotid artery stenosis 03/28/2019    Chest pain 01/11/2019    Bilateral carotid bruits 01/11/2019    Essential hypertension 01/11/2019    Intra-abdominal hematoma 05/18/2018    Generalized anxiety disorder 10/17/2017    Tobacco abuse 06/30/2017    Mixed hyperlipidemia 01/14/2015    Hypothyroidism 01/14/2015    GERD (gastroesophageal reflux disease) 01/15/2013    COPD (chronic obstructive pulmonary disease) ([facility] [address] 01/09/2012    Goiter        Allergies   Allergen Reactions    Morphine      Tremor    Amoxil [Amoxicillin]      diarrhea    Cefdinir Itching and Other (See Comments)    Fosamax [Alendronate] Other (See Comments)     Muscle aches      Pantoprazole Other (See Comments)     abdominal cramping, uncontrolled BM.  Simvastatin      muscle soreness.  Zetia [Ezetimibe]     Zocor [Simvastatin - High Dose]     Adhesive Tape Rash       Vitals:    02/05/20 1029   BP: 110/60   Site: Left Upper Arm   Position: Sitting   Cuff Size: Small Adult   Pulse: 54   Resp: 16   Temp: 98 °F (36.7 °C)   TempSrc: Oral   SpO2: 96%   Weight: 130 lb (59 kg)   Height: 5' 5\" (1.651 m)      Body mass index is 21.63 kg/m². HPI    She comes in today to follow-up on an exacerbation of chronic bronchitis that she had been seen [facility] for on 21 January.   She is better however she is back to coughing up appearance: Alert oriented pleasant cooperative she does seem fatigued  HEENT: She has cerumen in her ears bilaterally her oropharynx is clear there is no pain to palpation of her sinuses. Her facial muscles are symmetrical her color is good  Neck: Soft nontender no adenopathy  Lungs: Rhonchi throughout the did clear after she coughed and swallowed. She does have diminished breath sounds throughout. No wheezes no rales  Heart: Regular rate and rhythm          Assessment:   Diagnosis Orders   1. Mucopurulent chronic bronchitis ([address]  Nerissa Barirentos MD, Pulmonology, [place]    CT CHEST HIGH RESOLUTION    tiotropium (SPIRIVA HANDIHALER) 18 MCG inhalation capsule    sulfamethoxazole-trimethoprim (BACTRIM DS) 800-160 MG per tablet    External Referral to Pulmonology   2.  Screening mammogram, encounter for  SAADIA DIGITAL SCREEN W OR WO CAD BILATERAL               Plan:  Current Outpatient Medications   Medication Sig Dispense Refill    tiotropium (Ernestene Bathe) 18 MCG inhalation capsule Inhale 1 capsule into the lungs daily 30 capsule 5    sulfamethoxazole-trimethoprim (BACTRIM DS) 800-160 MG per tablet Take 1 tablet by mouth 2 times daily for 10 days 20 tablet 0    ALPRAZolam (XANAX) 0.5 MG tablet TAKE 1 TABLET BY MOUTH NIGHTLY as needed 30 tablet 2    fexofenadine (ALLEGRA) 180 MG tablet Take 180 mg by mouth daily      mometasone-formoterol (DULERA) 200-5 MCG/ACT inhaler INHALE 1 PUFF EVERY 12 HOURS AS DIRECTED 1 Inhaler 5    pitavastatin (LIVALO) 1 MG TABS tablet Take 0.5 tablets by mouth nightly 30 tablet 3    diltiazem (CARDIZEM) 60 MG tablet Take 1 tablet by mouth 2 times daily 180 tablet 2    fluticasone (FLONASE) 50 MCG/ACT nasal spray USE 1 SPRAY INTO EACH NOSTRIL ONCE DAILY 1 Bottle 2    citalopram (CELEXA) 40 MG tablet TAKE 1 TABLET BY MOUTH DAILY 90 tablet 3    aspirin 81 MG tablet Take 1 tablet by mouth daily With Food 30 tablet 3    albuterol sulfate  (90 Base) MCG/ACT inhaler 2 puffs every 6 hours 1 Inhaler 5    meclofenamate (MECLOMEN) 100 MG capsule TAKE 1 CAPSULE BY MOUTH DAILY AS NEEDED 90 capsule 0    conjugated estrogens (PREMARIN) 0.625 MG/GM vaginal cream Place vaginally daily. 1 Tube 3    levothyroxine (SYNTHROID) 50 MCG tablet Take 50 mcg by mouth daily On Tuesday and Thursday; Saturday  she takes 2 tabs. On Monday,Wednesday,Friday she takes 2 1/2 tabs  3    vitamin D3 (CHOLECALCIFEROL) 1000 UNITS TABS tablet Take 2 tablets by mouth daily        No current facility-administered medications for this visit. Orders Placed This Encounter   Procedures    CT CHEST HIGH RESOLUTION     Standing Status:   Future     Standing Expiration Date:   2/5/2021    SAADIA DIGITAL SCREEN W OR WO CAD BILATERAL     Standing Status:   Future     Standing Expiration Date:   4/5/2021   Skyla Pimentel MD, Pulmonology, [place]     Referral Priority:   Routine     Referral Type:   Eval and Treat     Referral Reason:   Specialty Services Required     Referred to Provider:   Evonne Valentin MD     Requested Specialty:   Pulmonology     Number of Visits Requested:   1    External Referral to Pulmonology     Referral Priority:   Routine     Referral Type:   Eval and Treat     Referral Reason:   Specialty Services Required     Requested Specialty:   Pulmonology     Number of Visits Requested:   1       Orders Placed This Encounter   Medications    tiotropium (Eino Fabian) 18 MCG inhalation capsule     Sig: Inhale 1 capsule into the lungs daily     Dispense:  30 capsule     Refill:  5    DISCONTD: clindamycin (CLEOCIN) 300 MG capsule     Sig: Take 1 capsule by mouth 3 times daily for 10 days     Dispense:  30 capsule     Refill:  0    sulfamethoxazole-trimethoprim (BACTRIM DS) 800-160 MG per tablet     Sig: Take 1 tablet by mouth 2 times daily for 10 days     Dispense:  20 tablet     Refill:  0              Return in about 4 weeks (around 3/4/2020).     Dr. Taina Iglesias

## 2020-02-12 ENCOUNTER — HOSPITAL ENCOUNTER (OUTPATIENT)
Dept: WOMENS IMAGING | Age: 70
Discharge: HOME | End: 2020-02-14
Payer: COMMERCIAL

## 2020-02-12 PROCEDURE — 77063 BREAST TOMOSYNTHESIS BI: CPT

## 2020-02-13 ENCOUNTER — TELEPHONE (OUTPATIENT)
Dept: FAMILY MEDICINE CLINIC | Age: 70
End: 2020-02-13

## 2020-02-18 ENCOUNTER — TELEPHONE (OUTPATIENT)
Dept: FAMILY MEDICINE CLINIC | Age: 70
End: 2020-02-18

## 2020-02-18 NOTE — TELEPHONE ENCOUNTER
Pt needs P/A for   mometasone-formoterol [facility]) 200-5 MCG/ACT inhaler [[id number]]     Pharm gave her number for P/A:  [phone]  (Through [employer])    TY

## 2020-02-25 ENCOUNTER — APPOINTMENT (OUTPATIENT)
Dept: ULTRASOUND IMAGING | Age: 70
End: 2020-02-25
Payer: COMMERCIAL

## 2020-02-25 ENCOUNTER — HOSPITAL ENCOUNTER (OUTPATIENT)
Dept: WOMENS IMAGING | Age: 70
Discharge: HOME | End: 2020-02-27
Payer: COMMERCIAL

## 2020-02-25 PROCEDURE — G0279 TOMOSYNTHESIS, MAMMO: HCPCS

## 2020-03-04 ENCOUNTER — HOSPITAL ENCOUNTER (OUTPATIENT)
Dept: CT IMAGING | Age: 70
Discharge: HOME | End: 2020-03-06
Payer: MEDICARE

## 2020-03-04 PROCEDURE — 71250 CT THORAX DX C-: CPT

## 2020-03-09 RX ORDER — DILTIAZEM HYDROCHLORIDE 60 MG/1
60 TABLET, FILM COATED ORAL 2 TIMES DAILY
Qty: 180 TABLET | Refills: 3 | Status: SHIPPED | OUTPATIENT
Start: 2020-03-09 | End: 2021-03-19 | Stop reason: SDUPTHER

## 2020-04-06 RX ORDER — ALPRAZOLAM 0.5 MG/1
TABLET ORAL
Qty: 30 TABLET | Refills: 2 | Status: SHIPPED | OUTPATIENT
Start: 2020-04-06 | End: 2020-07-06 | Stop reason: SDUPTHER

## 2020-07-06 RX ORDER — ALPRAZOLAM 0.5 MG/1
TABLET ORAL
Qty: 30 TABLET | Refills: 2 | Status: SHIPPED | OUTPATIENT
Start: 2020-07-06 | End: 2020-10-01 | Stop reason: SDUPTHER

## 2020-07-06 NOTE — TELEPHONE ENCOUNTER
requesting medication refill.      Rx requested:  Requested Prescriptions     Pending Prescriptions Disp Refills    ALPRAZolam (XANAX) 0.5 MG tablet 30 tablet 2     Sig: TAKE 1 TABLET BY MOUTH NIGHTLY as needed       Last Office Visit:   2/5/2020    Last Tox screen:    01/10/2020    Last Medication contract:    01/09/2020    Next Visit Date:  Future Appointments   Date Time Provider Lashonda Villagran   7/31/2020  1:00 PM Reed San MD [facility]

## 2020-09-08 RX ORDER — CITALOPRAM 40 MG/1
TABLET ORAL
Qty: 90 TABLET | Refills: 2 | Status: SHIPPED | OUTPATIENT
Start: 2020-09-08 | End: 2021-08-26 | Stop reason: SDUPTHER

## 2020-09-08 NOTE — TELEPHONE ENCOUNTER
Patient requesting medication refill.  Please approve or deny this request.    Rx requested:  Requested Prescriptions     Pending Prescriptions Disp Refills    citalopram (CELEXA) 40 MG tablet 90 tablet 1         Last Office Visit:   2/5/2020      Next Visit Date:  Future Appointments   Date Time Provider Lashonda Villagran   12/7/2020 12:15 PM Kaylyn Gustafson MD [facility]

## 2020-10-01 RX ORDER — ALPRAZOLAM 0.5 MG/1
TABLET ORAL
Qty: 30 TABLET | Refills: 2 | Status: SHIPPED | OUTPATIENT
Start: 2020-10-01 | End: 2020-12-30 | Stop reason: SDUPTHER

## 2020-12-07 ENCOUNTER — OFFICE VISIT (OUTPATIENT)
Dept: CARDIOLOGY CLINIC | Age: 70
End: 2020-12-07
Payer: MEDICARE

## 2020-12-07 VITALS
WEIGHT: 120 LBS | OXYGEN SATURATION: 99 % | HEIGHT: 65 IN | DIASTOLIC BLOOD PRESSURE: 82 MMHG | HEART RATE: 68 BPM | RESPIRATION RATE: 16 BRPM | BODY MASS INDEX: 19.99 KG/M2 | SYSTOLIC BLOOD PRESSURE: 119 MMHG

## 2020-12-07 PROCEDURE — 99214 OFFICE O/P EST MOD 30 MIN: CPT | Performed by: INTERNAL MEDICINE

## 2020-12-07 PROCEDURE — 93000 ELECTROCARDIOGRAM COMPLETE: CPT | Performed by: INTERNAL MEDICINE

## 2020-12-07 ASSESSMENT — ENCOUNTER SYMPTOMS
RESPIRATORY NEGATIVE: 1
WHEEZING: 0
BLOOD IN STOOL: 0
STRIDOR: 0
GASTROINTESTINAL NEGATIVE: 1
CHEST TIGHTNESS: 0
COUGH: 0
EYES NEGATIVE: 1
NAUSEA: 0
SHORTNESS OF BREATH: 0

## 2020-12-07 NOTE — PROGRESS NOTES
Subsequent Progress Note  Patient: Kena Muro  YOB: 1950  MRN: [id number]    Chief Complaint: carotid htn carotid cad  Chief Complaint   Patient presents with    1 Year Follow Up    Atrial Fibrillation    Coronary Artery Disease    Hypertension       CV Data:  2/2017 spect negative ccf  2/2017 PVR negative ccf  1/2019 CUS extensive atherosclerosis R>L  1/19 echo ef 60   4/5/2019 CTA neck - CHERELLE 07%  LICA 34  5/21/6787 CATH LAD Mild RCA 20% EF 55 EDP 5   S/P Complete Thyroidectomy     Subjective/HPI: no cp no sob no falls no bleed tolerating only 1/2 tab Livalo. Full tab caused severe myalgia. LDL not to goal.      1/13/2020 no cp no sob no falls no bleed f/u CT chest mild enlarged R nodule. 12/7/2020 still smokes.  No cp no palp no falls no bleeed no sob    Smokes 1/2 ppd   Retired   EKG: SR 58    Past Medical History:   Diagnosis Date    A-fib ([facility] [address]     Anxiety     Asthma     Cancer ([facility] [address] 2018    squamous cell- eye brow - CC    Chronic constipation     Colon polyps     COPD (chronic obstructive pulmonary disease) (HCC)     Cyst of nipple     Endometriosis     Fibromyalgia     Goiter     Goiter     Hyperlipidemia     Hypertension     Hypothyroidism     Osteoarthritis     Thyroid ca ([facility] [address]     TMJ (dislocation of temporomandibular joint)     TMJ arthritis        Past Surgical History:   Procedure Laterality Date    BREAST SURGERY      COLONOSCOPY      DIAGNOSTIC CARDIAC CATH LAB PROCEDURE  04/18/2019    THYROIDECTOMY      THYROIDECTOMY Left 1/6/2015       Family History   Problem Relation Age of Onset    Heart Disease Mother 61        CABG    Cancer Mother [de-identified]        lung    High Blood Pressure Mother     Heart Disease Father 48    High Blood Pressure Maternal Grandmother     High Blood Pressure Paternal Grandmother     Heart Attack Neg Hx     Hypertension Neg Hx     Diabetes Neg Hx        Social History     Socioeconomic History    Marital status:      Spouse name: None    Number of children: None    Years of education: None    Highest education level: None   Occupational History    None   Social Needs    Financial resource strain: None    Food insecurity     Worry: None     Inability: None    Transportation needs     Medical: None     Non-medical: None   Tobacco Use    Smoking status: Current Some Day Smoker     Packs/day: 1.00     Years: 45.00     Pack years: 45.00     Types: Cigarettes     Start date: 1966    Smokeless tobacco: Never Used    Tobacco comment: 9/23/19 now smoking 1 cig twice per day, had quit for 7 years at one point   Substance and Sexual Activity    Alcohol use: Yes    Drug use: No    Sexual activity: Yes     Partners: Male   Lifestyle    Physical activity     Days per week: None     Minutes per session: None    Stress: None   Relationships    Social connections     Talks on phone: None     Gets together: None     Attends Pentecostal service: None     Active member of club or organization: None     Attends meetings of clubs or organizations: None     Relationship status: None    Intimate partner violence     Fear of current or ex partner: None     Emotionally abused: None     Physically abused: None     Forced sexual activity: None   Other Topics Concern    None   Social History Narrative    None       Allergies   Allergen Reactions    Morphine      Tremor    Amoxil [Amoxicillin]      diarrhea    Cefdinir Itching and Other (See Comments)    Fosamax [Alendronate] Other (See Comments)     Muscle aches      Pantoprazole Other (See Comments)     abdominal cramping, uncontrolled BM.  Simvastatin      muscle soreness.     Zetia [Ezetimibe]     Zocor [Simvastatin - High Dose]     Adhesive Tape Rash       Current Outpatient Medications   Medication Sig Dispense Refill    ALPRAZolam (XANAX) 0.5 MG tablet TAKE 1 TABLET BY MOUTH NIGHTLY as needed 30 tablet 2    citalopram (CELEXA) 40 MG tablet Take 1 tablet by mouth once daily 90 tablet 2    mometasone-formoterol (DULERA) 200-5 MCG/ACT inhaler INHALE 1 PUFF BY MOUTH EVERY 12 HOURS AS DIRECTED 13 g 5    dilTIAZem (CARDIZEM) 60 MG tablet Take 1 tablet by mouth 2 times daily 180 tablet 3    pitavastatin (LIVALO) 1 MG TABS tablet Take 0.5 tablets by mouth nightly 15 tablet 11    fexofenadine (ALLEGRA) 180 MG tablet Take 180 mg by mouth daily      fluticasone (FLONASE) 50 MCG/ACT nasal spray USE 1 SPRAY INTO EACH NOSTRIL ONCE DAILY 1 Bottle 2    aspirin 81 MG tablet Take 1 tablet by mouth daily With Food 30 tablet 3    albuterol sulfate  (90 Base) MCG/ACT inhaler 2 puffs every 6 hours 1 Inhaler 5    meclofenamate (MECLOMEN) 100 MG capsule TAKE 1 CAPSULE BY MOUTH DAILY AS NEEDED 90 capsule 0    conjugated estrogens (PREMARIN) 0.625 MG/GM vaginal cream Place vaginally daily. 1 Tube 3    levothyroxine (SYNTHROID) 50 MCG tablet Take 50 mcg by mouth daily On Tuesday and Thursday; Saturday  she takes 2 tabs. On Monday,Wednesday,Friday she takes 2 1/2 tabs  3    vitamin D3 (CHOLECALCIFEROL) 1000 UNITS TABS tablet Take 2 tablets by mouth daily        No current facility-administered medications for this visit. Review of Systems:   Review of Systems   Constitutional: Negative. Negative for diaphoresis and fatigue. HENT: Negative. Eyes: Negative. Respiratory: Negative. Negative for cough, chest tightness, shortness of breath, wheezing and stridor. Cardiovascular: Negative. Negative for chest pain, palpitations and leg swelling. Gastrointestinal: Negative. Negative for blood in stool and nausea. Genitourinary: Negative. Musculoskeletal: Negative. Skin: Negative. Neurological: Negative. Negative for dizziness, syncope, weakness and light-headedness. Hematological: Negative. Psychiatric/Behavioral: Negative.           Physical Examination:    /82 (Site: Left Upper Arm, Position: Sitting, Cuff Size: Medium Adult) 05/14/2019    LABGLOM >60.0 05/14/2019    GLUCOSE 87 05/14/2019    PROT 6.9 05/14/2019    LABALBU 4.7 05/14/2019    CALCIUM 9.4 05/14/2019    BILITOT 0.3 05/14/2019    ALKPHOS 100 05/14/2019    AST 17 05/14/2019    ALT 11 05/14/2019     BMP:    Lab Results   Component Value Date     05/14/2019    K 4.5 05/14/2019     05/14/2019    CO2 25 05/14/2019    BUN 17 05/14/2019    LABALBU 4.7 05/14/2019    CREATININE 0.74 05/14/2019    CALCIUM 9.4 05/14/2019    GFRAA >60.0 05/14/2019    LABGLOM >60.0 05/14/2019    GLUCOSE 87 05/14/2019     Magnesium:    Lab Results   Component Value Date    MG 2.1 05/14/2019     TSH:  Lab Results   Component Value Date    TSH 3.190 09/16/2019       Patient Active Problem List   Diagnosis    Goiter    COPD (chronic obstructive pulmonary disease) ([facility] [address]    GERD (gastroesophageal reflux disease)    Mixed hyperlipidemia    Hypothyroidism    Generalized anxiety disorder    Tobacco abuse    Intra-abdominal hematoma    Chest pain    Bilateral carotid bruits    Essential hypertension    Atrial fibrillation ([address]    Coronary artery disease involving native coronary artery of native heart without angina pectoris    Hyperlipidemia    Bilateral carotid artery stenosis    Simple chronic bronchitis (HCC)    Angina effort       Medications Discontinued During This Encounter   Medication Reason    tiotropium (SPIRIVA HANDIHALER) 18 MCG inhalation capsule DISCONTINUED BY ANOTHER CLINICIAN       Modified Medications    No medications on file       No orders of the defined types were placed in this encounter. Assessment/Plan:    1. Essential hypertension  Stable     2. Coronary artery disease involving native coronary artery of native heart without angina pectoris  No angina    3. Carotid - mild - CUS    4. R Lung nodule- repeat CT chesst scheduled for 6 mo. If worse at tthat time [facility] eval - stable     5. Claudication Cold feet - PVR     Counseling:  Heart Healthy

## 2020-12-24 ENCOUNTER — HOSPITAL ENCOUNTER (OUTPATIENT)
Dept: ULTRASOUND IMAGING | Age: 70
Discharge: HOME | End: 2020-12-26
Payer: MEDICARE

## 2020-12-24 DIAGNOSIS — I10 ESSENTIAL HYPERTENSION: ICD-10-CM

## 2020-12-24 DIAGNOSIS — E78.5 HYPERLIPIDEMIA, UNSPECIFIED HYPERLIPIDEMIA TYPE: ICD-10-CM

## 2020-12-24 LAB
ALBUMIN SERPL-MCNC: 4.2 G/DL (ref 3.5–4.6)
ALP BLD-CCNC: 109 U/L (ref 40–130)
ALT SERPL-CCNC: 17 U/L (ref 0–33)
ANION GAP SERPL CALCULATED.3IONS-SCNC: 9 MEQ/L (ref 9–15)
AST SERPL-CCNC: 21 U/L (ref 0–35)
BILIRUB SERPL-MCNC: 0.3 MG/DL (ref 0.2–0.7)
BUN BLDV-MCNC: 13 MG/DL (ref 8–23)
CALCIUM SERPL-MCNC: 9.3 MG/DL (ref 8.5–9.9)
CHLORIDE BLD-SCNC: 102 MEQ/L (ref 95–107)
CHOLESTEROL, FASTING: 189 MG/DL (ref 0–199)
CO2: 27 MEQ/L (ref 20–31)
CREAT SERPL-MCNC: 0.67 MG/DL (ref 0.5–0.9)
GFR AFRICAN AMERICAN: >60
GFR NON-AFRICAN AMERICAN: >60
GLOBULIN: 2.6 G/DL (ref 2.3–3.5)
GLUCOSE BLD-MCNC: 85 MG/DL (ref 70–99)
HCT VFR BLD CALC: 43 % (ref 37–47)
HDLC SERPL-MCNC: 76 MG/DL (ref 40–59)
HEMOGLOBIN: 14.4 G/DL (ref 12–16)
LDL CHOLESTEROL CALCULATED: 94 MG/DL (ref 0–129)
MAGNESIUM: 2.1 MG/DL (ref 1.7–2.4)
MCH RBC QN AUTO: 30.9 PG (ref 27–31.3)
MCHC RBC AUTO-ENTMCNC: 33.5 % (ref 33–37)
MCV RBC AUTO: 92.4 FL (ref 82–100)
PDW BLD-RTO: 14 % (ref 11.5–14.5)
PLATELET # BLD: 168 K/UL (ref 130–400)
POTASSIUM SERPL-SCNC: 4.4 MEQ/L (ref 3.4–4.9)
RBC # BLD: 4.65 M/UL (ref 4.2–5.4)
SODIUM BLD-SCNC: 138 MEQ/L (ref 135–144)
TOTAL PROTEIN: 6.8 G/DL (ref 6.3–8)
TRIGLYCERIDE, FASTING: 94 MG/DL (ref 0–150)
TSH SERPL DL<=0.05 MIU/L-ACNC: 2.76 UIU/ML (ref 0.44–3.86)
WBC # BLD: 4.6 K/UL (ref 4.8–10.8)

## 2020-12-24 PROCEDURE — 93924 LWR XTR VASC STDY BILAT: CPT

## 2020-12-24 PROCEDURE — 93880 EXTRACRANIAL BILAT STUDY: CPT

## 2020-12-30 PROCEDURE — 93924 LWR XTR VASC STDY BILAT: CPT | Performed by: INTERNAL MEDICINE

## 2020-12-30 RX ORDER — ALPRAZOLAM 0.5 MG/1
TABLET ORAL
Qty: 30 TABLET | Refills: 2 | Status: SHIPPED | OUTPATIENT
Start: 2020-12-30 | End: 2021-04-05 | Stop reason: SDUPTHER

## 2020-12-31 PROCEDURE — 93880 EXTRACRANIAL BILAT STUDY: CPT | Performed by: INTERNAL MEDICINE

## 2021-01-11 ENCOUNTER — VIRTUAL VISIT (OUTPATIENT)
Dept: CARDIOLOGY CLINIC | Age: 71
End: 2021-01-11
Payer: MEDICARE

## 2021-01-11 DIAGNOSIS — I10 ESSENTIAL HYPERTENSION: ICD-10-CM

## 2021-01-11 DIAGNOSIS — E78.5 HYPERLIPIDEMIA, UNSPECIFIED HYPERLIPIDEMIA TYPE: Primary | ICD-10-CM

## 2021-01-11 DIAGNOSIS — I25.10 CORONARY ARTERY DISEASE INVOLVING NATIVE CORONARY ARTERY OF NATIVE HEART WITHOUT ANGINA PECTORIS: ICD-10-CM

## 2021-01-11 DIAGNOSIS — I65.23 BILATERAL CAROTID ARTERY STENOSIS: ICD-10-CM

## 2021-01-11 PROCEDURE — 99214 OFFICE O/P EST MOD 30 MIN: CPT | Performed by: INTERNAL MEDICINE

## 2021-01-11 ASSESSMENT — ENCOUNTER SYMPTOMS
EYES NEGATIVE: 1
STRIDOR: 0
BLOOD IN STOOL: 0
GASTROINTESTINAL NEGATIVE: 1
SHORTNESS OF BREATH: 0
RESPIRATORY NEGATIVE: 1
COUGH: 0
CHEST TIGHTNESS: 0
WHEEZING: 0
NAUSEA: 0

## 2021-01-11 NOTE — PROGRESS NOTES
Heart Attack Neg Hx     Hypertension Neg Hx     Diabetes Neg Hx        Social History     Socioeconomic History    Marital status:      Spouse name: Not on file    Number of children: Not on file    Years of education: Not on file    Highest education level: Not on file   Occupational History    Not on file   Social Needs    Financial resource strain: Not on file    Food insecurity     Worry: Not on file     Inability: Not on file    Transportation needs     Medical: Not on file     Non-medical: Not on file   Tobacco Use    Smoking status: Current Some Day Smoker     Packs/day: 1.00     Years: 45.00     Pack years: 45.00     Types: Cigarettes     Start date: 1966    Smokeless tobacco: Never Used    Tobacco comment: 9/23/19 now smoking 1 cig twice per day, had quit for 7 years at one point   Substance and Sexual Activity    Alcohol use: Yes    Drug use: No    Sexual activity: Yes     Partners: Male   Lifestyle    Physical activity     Days per week: Not on file     Minutes per session: Not on file    Stress: Not on file   Relationships    Social connections     Talks on phone: Not on file     Gets together: Not on file     Attends Oriental orthodox service: Not on file     Active member of club or organization: Not on file     Attends meetings of clubs or organizations: Not on file     Relationship status: Not on file    Intimate partner violence     Fear of current or ex partner: Not on file     Emotionally abused: Not on file     Physically abused: Not on file     Forced sexual activity: Not on file   Other Topics Concern    Not on file   Social History Narrative    Not on file       Allergies   Allergen Reactions    Morphine      Tremor    Amoxil [Amoxicillin]      diarrhea    Cefdinir Itching and Other (See Comments)    Fosamax [Alendronate] Other (See Comments)     Muscle aches      Pantoprazole Other (See Comments)     abdominal cramping, uncontrolled BM.      Simvastatin      muscle soreness.  Zetia [Ezetimibe]     Zocor [Simvastatin - High Dose]     Adhesive Tape Rash       Current Outpatient Medications   Medication Sig Dispense Refill    ALPRAZolam (XANAX) 0.5 MG tablet TAKE 1 TABLET BY MOUTH NIGHTLY as needed 30 tablet 2    citalopram (CELEXA) 40 MG tablet Take 1 tablet by mouth once daily 90 tablet 2    mometasone-formoterol (DULERA) 200-5 MCG/ACT inhaler INHALE 1 PUFF BY MOUTH EVERY 12 HOURS AS DIRECTED 13 g 5    dilTIAZem (CARDIZEM) 60 MG tablet Take 1 tablet by mouth 2 times daily 180 tablet 3    pitavastatin (LIVALO) 1 MG TABS tablet Take 0.5 tablets by mouth nightly 15 tablet 11    fexofenadine (ALLEGRA) 180 MG tablet Take 180 mg by mouth daily      fluticasone (FLONASE) 50 MCG/ACT nasal spray USE 1 SPRAY INTO EACH NOSTRIL ONCE DAILY 1 Bottle 2    aspirin 81 MG tablet Take 1 tablet by mouth daily With Food 30 tablet 3    albuterol sulfate  (90 Base) MCG/ACT inhaler 2 puffs every 6 hours 1 Inhaler 5    meclofenamate (MECLOMEN) 100 MG capsule TAKE 1 CAPSULE BY MOUTH DAILY AS NEEDED 90 capsule 0    conjugated estrogens (PREMARIN) 0.625 MG/GM vaginal cream Place vaginally daily. 1 Tube 3    levothyroxine (SYNTHROID) 50 MCG tablet Take 50 mcg by mouth daily On Tuesday and Thursday; Saturday  she takes 2 tabs. On Monday,Wednesday,Friday she takes 2 1/2 tabs  3    vitamin D3 (CHOLECALCIFEROL) 1000 UNITS TABS tablet Take 2 tablets by mouth daily        No current facility-administered medications for this visit. Review of Systems:   Review of Systems   Constitutional: Negative. Negative for diaphoresis and fatigue. HENT: Negative. Eyes: Negative. Respiratory: Negative. Negative for cough, chest tightness, shortness of breath, wheezing and stridor. Cardiovascular: Negative. Negative for chest pain, palpitations and leg swelling. Gastrointestinal: Negative. Negative for blood in stool and nausea. Genitourinary: Negative.     Musculoskeletal: Negative. Skin: Negative. Neurological: Negative. Negative for dizziness, syncope, weakness and light-headedness. Hematological: Negative. Psychiatric/Behavioral: Negative. Physical Examination:    [facility] 01/09/1995    Physical Exam   Constitutional: She appears healthy. No distress. HENT:   Normal cephalic and Atraumatic   Eyes: Pupils are equal, round, and reactive to light. Neck: Normal range of motion and thyroid normal. Neck supple. No JVD present. No neck adenopathy. No thyromegaly present. Cardiovascular: Normal rate, regular rhythm, intact distal pulses and normal pulses. Murmur heard. Pulmonary/Chest: Effort normal and breath sounds normal. She has no wheezes. She has no rales. She exhibits no tenderness. Abdominal: Soft. Bowel sounds are normal. There is no abdominal tenderness. Musculoskeletal: Normal range of motion. General: No tenderness or edema. Neurological: She is alert and oriented to person, place, and time. Skin: Skin is warm. No cyanosis. Nails show no clubbing.        LABS:  CBC:   Lab Results   Component Value Date    WBC 4.6 12/24/2020    RBC 4.65 12/24/2020    HGB 14.4 12/24/2020    HCT 43.0 12/24/2020    MCV 92.4 12/24/2020    MCH 30.9 12/24/2020    MCHC 33.5 12/24/2020    RDW 14.0 12/24/2020     12/24/2020    MPV 9.6 10/09/2014     Lipids:  Lab Results   Component Value Date    CHOL 138 11/13/2019    CHOL 219 (H) 05/14/2019    CHOL 256 (H) 02/05/2019     Lab Results   Component Value Date    TRIG 160 (H) 11/13/2019    TRIG 104 05/14/2019    TRIG 116 02/05/2019     Lab Results   Component Value Date    HDL 76 (H) 12/24/2020    HDL 73 (H) 11/13/2019    HDL 76 (H) 05/14/2019     Lab Results   Component Value Date    LDLCALC 94 12/24/2020    LDLCALC 33 11/13/2019    LDLCALC 122 05/14/2019     No results found for: LABVLDL, VLDL  No results found for: CHOLHDLRATIO  CMP:    Lab Results   Component Value Date     12/24/2020    K 4.4 12/24/2020     12/24/2020    CO2 27 12/24/2020    BUN 13 12/24/2020    CREATININE 0.67 12/24/2020    GFRAA >60.0 12/24/2020    LABGLOM >60.0 12/24/2020    GLUCOSE 85 12/24/2020    PROT 6.8 12/24/2020    LABALBU 4.2 12/24/2020    CALCIUM 9.3 12/24/2020    BILITOT 0.3 12/24/2020    ALKPHOS 109 12/24/2020    AST 21 12/24/2020    ALT 17 12/24/2020     BMP:    Lab Results   Component Value Date     12/24/2020    K 4.4 12/24/2020     12/24/2020    CO2 27 12/24/2020    BUN 13 12/24/2020    LABALBU 4.2 12/24/2020    CREATININE 0.67 12/24/2020    CALCIUM 9.3 12/24/2020    GFRAA >60.0 12/24/2020    LABGLOM >60.0 12/24/2020    GLUCOSE 85 12/24/2020     Magnesium:    Lab Results   Component Value Date    MG 2.1 12/24/2020     TSH:  Lab Results   Component Value Date    TSH 2.760 12/24/2020       Patient Active Problem List   Diagnosis    Goiter    COPD (chronic obstructive pulmonary disease) (HCC)    GERD (gastroesophageal reflux disease)    Mixed hyperlipidemia    Hypothyroidism    Generalized anxiety disorder    Tobacco abuse    Intra-abdominal hematoma    Chest pain    Bilateral carotid bruits    Essential hypertension    Atrial fibrillation ([facility] [address]    Coronary artery disease involving native coronary artery of native heart without angina pectoris    Hyperlipidemia    Bilateral carotid artery stenosis    Simple chronic bronchitis (HCC)    Angina effort       There are no discontinued medications. Modified Medications    No medications on file       No orders of the defined types were placed in this encounter. Assessment/Plan:    1. Essential hypertension  Stable     2. Coronary artery disease involving native coronary artery of native heart without angina pectoris  No angina    3. Carotid - mild - CUS    4. R Lung nodule- repeat CT chesst scheduled for 6 mo. If worse at tthat time [facility] eval - stable in f/u.      5.Claudication Cold feet - PVR - stable      Counseling:  Heart Healthy Lifestyle, Stop Smoking, Low Salt Diet, Take Precautions to Prevent Falls, Regular Exercise and Walk Daily    Return in about 4 months (around 5/11/2021). Pursuant to the emergency declaration under the [address] [place] [address] waiver authority and the [employer] Act, this Virtual Visit was conducted, with patient's consent, to reduce the patient's risk of exposure to COVID-19 and provide continuity of care for an established patient. Services were provided through a video synchronous discussion virtually to substitute for in-person clinic visit. Visit completed using [email or web address]. Patient was located at home and I was located in the clinic.     Electronically signed by Octavia Beavers MD on 1/11/2021 at 2:27 PM

## 2021-01-27 ENCOUNTER — OFFICE VISIT (OUTPATIENT)
Dept: FAMILY MEDICINE CLINIC | Age: 71
End: 2021-01-27
Payer: MEDICARE

## 2021-01-27 DIAGNOSIS — Z12.31 VISIT FOR SCREENING MAMMOGRAM: Primary | ICD-10-CM

## 2021-01-27 DIAGNOSIS — Z00.00 ROUTINE GENERAL MEDICAL EXAMINATION AT A HEALTH CARE FACILITY: ICD-10-CM

## 2021-01-27 DIAGNOSIS — F17.210 CIGARETTE SMOKER: ICD-10-CM

## 2021-01-27 PROCEDURE — G0438 PPPS, INITIAL VISIT: HCPCS | Performed by: FAMILY MEDICINE

## 2021-01-27 RX ORDER — MONTELUKAST SODIUM 10 MG/1
10 TABLET ORAL NIGHTLY
COMMUNITY
Start: 2020-05-14

## 2021-01-27 ASSESSMENT — PATIENT HEALTH QUESTIONNAIRE - PHQ9
SUM OF ALL RESPONSES TO PHQ QUESTIONS 1-9: 1
SUM OF ALL RESPONSES TO PHQ QUESTIONS 1-9: 1
SUM OF ALL RESPONSES TO PHQ9 QUESTIONS 1 & 2: 1
1. LITTLE INTEREST OR PLEASURE IN DOING THINGS: 0

## 2021-01-27 ASSESSMENT — LIFESTYLE VARIABLES
HAS A RELATIVE, FRIEND, DOCTOR, OR ANOTHER HEALTH PROFESSIONAL EXPRESSED CONCERN ABOUT YOUR DRINKING OR SUGGESTED YOU CUT DOWN: 0
HOW OFTEN DO YOU HAVE A DRINK CONTAINING ALCOHOL: 3
HOW OFTEN DURING THE LAST YEAR HAVE YOU HAD A FEELING OF GUILT OR REMORSE AFTER DRINKING: 0
HOW OFTEN DURING THE LAST YEAR HAVE YOU BEEN UNABLE TO REMEMBER WHAT HAPPENED THE NIGHT BEFORE BECAUSE YOU HAD BEEN DRINKING: 0
AUDIT-C TOTAL SCORE: 3
HOW OFTEN DO YOU HAVE SIX OR MORE DRINKS ON ONE OCCASION: 0
HOW OFTEN DURING THE LAST YEAR HAVE YOU FAILED TO DO WHAT WAS NORMALLY EXPECTED FROM YOU BECAUSE OF DRINKING: 0

## 2021-01-27 NOTE — PROGRESS NOTES
Patient: Devin Russ (: 1950) is a 79 y.o. female,Established patient, here for evaluation of the following chief complaint(s):  Chief Complaint   Patient presents with    Medicare AWV     AWV       Date: 21    Allergies   Allergen Reactions    Morphine      Tremor    Amoxil [Amoxicillin]      diarrhea    Cefdinir Itching and Other (See Comments)    Fosamax [Alendronate] Other (See Comments)     Muscle aches      Pantoprazole Other (See Comments)     abdominal cramping, uncontrolled BM.  Simvastatin      muscle soreness.  Zetia [Ezetimibe]     Zocor [Simvastatin - High Dose]     Adhesive Tape Rash       There were no vitals filed for this visit. There is no height or weight on file to calculate BMI. PHQ Scores 2021 2020 2019 2/15/2018 2017 2016 2014   PHQ2 Score 1 0 0 0 0 1 0   PHQ9 Score 1 0 0 0 0 1 0     Interpretation of Total Score Depression Severity: 1-4 = Minimal depression, 5-9 = Mild depression, 10-14 = Moderate depression, 15-19 = Moderately severe depression, 20-27 = Severe depression    HPI        Review of Systems    Constitutional: Negative for fatigue, fever and sweats. HEENT: Negative for eye discharge and vision loss. Negative for ear drainage, hearing loss and nasal drainage. Respiratory: Negative for cough, dyspnea and wheezing. Cardiovascular:  Negative for chest pain, claudication and irregular heartbeat/palpitations. Gastrointestinal: Negative for abdominal pain, nausea, constipation and diarrhea. Genitourinary: Negative for dysuria, patient postmenopausal.  Metabolic/Endocrine: Negative for cold intolerance, heat intolerance, polydipsia and polyphagia. No unintended weight loss or weight gain. Neuro/Psychiatric: Negative for gait disturbance. Negative for psychiatric symptoms. Dermatologic: Negative for pruritus and rash. Musculoskeletal: positive for bone/joint symptoms. No numbness or tingling.  No loss of function. Hematology: Negative for bleeding and easy bruising. Immunology:  Negative for environmental allergies and food allergies.

## 2021-01-27 NOTE — PATIENT INSTRUCTIONS
family can't agree on what should be in your living will. · You can change your living will at any time. Some people find that their wishes about end-of-life care change as their health changes. If you make big changes to your living will, complete a new form. · If you move to another state, make sure that your living will is legal in the state where you now live. In most cases, doctors will respect your wishes even if you have a form from a different state. · You might use a universal form that has been approved by many states. This kind of form can sometimes be filled out and stored online. Your digital copy will then be available wherever you have a connection to the internet. The doctors and nurses who need to treat you can find it right away. · Your state may offer an online registry. This is another place where you can store your living will online. · It's a good idea to get your living will notarized. This means using a person called a  to watch two people sign, or witness, your living will. What should you know when you create a living will? Here are some questions to ask yourself as you make your living will:  · Do you know enough about life support methods that might be used? If not, talk to your doctor so you know what might be done if you can't breathe on your own, your heart stops, or you can't swallow. · What things would you still want to be able to do after you receive life-support methods? Would you want to be able to walk? To speak? To eat on your own? To live without the help of machines? · Do you want certain Caodaism practices performed if you become very ill? · If you have a choice, where do you want to be cared for? In your home? At a hospital or nursing home? · If you have a choice at the end of your life, where would you prefer to die? At home? In a hospital or nursing home? Somewhere else? · Would you prefer to be buried or cremated?   · Do you want your organs to be donated after you die? What should you do with your living will? · Make sure that your family members and your health care agent have copies of your living will (also called a declaration). · Give your doctor a copy of your living will. Ask him or her to keep it as part of your medical record. If you have more than one doctor, make sure that each one has a copy. · Put a copy of your living will where it can be easily found. For example, some people may put a copy on their refrigerator door. If you are using a digital copy, be sure your doctor, family members, and health care agent know how to find and access it. Where can you learn more? Go to [email or web address] and sign in to your [email or web address] account. Enter J137 in the sarvaMAIL box to learn more about \"Learning About Living Jake Keene. \"     If you do not have an account, please click on the \"Sign Up Now\" link. Current as of: December 9, 2019               Content Version: 12.6  © 6253-2541 [employer]. Care instructions adapted under license by [facility] [place] ([facility]). If you have questions about a medical condition or this instruction, always ask your healthcare professional. [place] [address] any warranty or liability for your use of this information. Personalized Preventive Plan for Beua Norris - 1/27/2021  Medicare offers a range of preventive health benefits. Some of the tests and screenings are paid in full while other may be subject to a deductible, co-insurance, and/or copay. Some of these benefits include a comprehensive review of your medical history including lifestyle, illnesses that may run in your family, and various assessments and screenings as appropriate. After reviewing your medical record and screening and assessments performed today your provider may have ordered immunizations, labs, imaging, and/or referrals for you.   A list of these orders (if applicable) as well as your Preventive Care list are included within your After Visit Summary for your review. Other Preventive Recommendations:    · A preventive eye exam performed by an eye specialist is recommended every 1-2 years to screen for glaucoma; cataracts, macular degeneration, and other eye disorders. · A preventive dental visit is recommended every 6 months. · Try to get at least 150 minutes of exercise per week or 10,000 steps per day on a pedometer . · Order or download the FREE \"Exercise & Physical Activity: Your Everyday Guide\" from [employer]. Call 5-625.932.3794 or search [employer] online. · You need 5413-0086 mg of calcium and 0217-3150 IU of vitamin D per day. It is possible to meet your calcium requirement with diet alone, but a vitamin D supplement is usually necessary to meet this goal.  · When exposed to the sun, use a sunscreen that protects against both UVA and UVB radiation with an SPF of 30 or greater. Reapply every 2 to 3 hours or after sweating, drying off with a towel, or swimming. · Always wear a seat belt when traveling in a car. Always wear a helmet when riding a bicycle or motorcycle.

## 2021-01-27 NOTE — PROGRESS NOTES
Take 1 tablet by mouth daily With Food Yes Chelsi Lopez MD   albuterol sulfate  (90 Base) MCG/ACT inhaler 2 puffs every 6 hours Yes Herve Hines MD   meclofenamate (MECLOMEN) 100 MG capsule TAKE 1 CAPSULE BY MOUTH DAILY AS NEEDED Yes Herve Hines MD   conjugated estrogens (PREMARIN) 0.625 MG/GM vaginal cream Place vaginally daily. Yes Herve Hines MD   levothyroxine (SYNTHROID) 50 MCG tablet Take 50 mcg by mouth daily On Tuesday and Thursday; Saturday  she takes 2 tabs.   On Monday,Wednesday,Friday she takes 2 1/2 tabs Yes Historical Provider, MD       Past Medical History:   Diagnosis Date    [facility]     Anxiety     Asthma     Cancer ([facility] 2018    squamous cell- eye brow - CC    Chronic constipation     Colon polyps     COPD (chronic obstructive pulmonary disease) (HCC)     Cyst of nipple     Endometriosis     Fibromyalgia     Goiter     Goiter     Hyperlipidemia     Hypertension     Hypothyroidism     Osteoarthritis     Thyroid ca ([facility]     TMJ (dislocation of temporomandibular joint)     TMJ arthritis        Past Surgical History:   Procedure Laterality Date    BREAST SURGERY      COLONOSCOPY      DIAGNOSTIC CARDIAC CATH LAB PROCEDURE  04/18/2019    THYROIDECTOMY      THYROIDECTOMY Left 1/6/2015       Family History   Problem Relation Age of Onset    Heart Disease Mother 61        CABG    Cancer Mother [de-identified]        lung    High Blood Pressure Mother     Heart Disease Father 48    High Blood Pressure Maternal Grandmother     High Blood Pressure Paternal Grandmother     Heart Attack Neg Hx     Hypertension Neg Hx     Diabetes Neg Hx        CareTeam (Including outside providers/suppliers regularly involved in providing care):   Patient Care Team:  Jimena Beyer MD as PCP - General  Herve Hines MD as PCP - [facility] Empaneled Provider  Chelsi Lopez MD as Cardiologist (Cardiology)  Christina Landry RN as Nurse Navigator    Wt Readings from Last 3 Encounters: 12/07/20 120 lb (54.4 kg)   02/05/20 130 lb (59 kg)   01/21/20 130 lb (59 kg)      No flowsheet data found. There is no height or weight on file to calculate BMI. Based upon direct observation of the patient, evaluation of cognition reveals recent and remote memory intact. Not able to be done as this was a phone visit. Patient was alert, oriented, appropriate, not SOB with talking and not in distress. Patient's complete Health Risk Assessment and screening values have been reviewed and are found in Flowsheets. The following problems were reviewed today and where indicated follow up appointments were made and/or referrals ordered. Positive Risk Factor Screenings with Interventions:         Substance History:  Social History     Tobacco History     Smoking Status  Current Some Day Smoker Smoking Start Date  1/1/1966 Smoking Frequency  1 pack/day for 45 years (39 pk yrs) Smoking Tobacco Type  Cigarettes    Smokeless Tobacco Use  Never Used    Tobacco Comment  9/23/19 now smoking 1 cig twice per day, had quit for 7 years at one point          Alcohol History     Alcohol Use Status  Yes          Drug Use     Drug Use Status  No          Sexual Activity     Sexually Active  Yes Partners  Male               Alcohol Screening: Audit-C Score: 3  Total Score: 3    A score of 8 or more is associated with harmful or hazardous drinking. A score of 13 or more in women, and 15 or more in men, is likely to indicate alcohol dependence. Substance Abuse Interventions:  · Tobacco abuse:  patient is not ready to work toward tobacco cessation at this time    [facility] and ACP:  General  In general, how would you say your health is?: Good  In the past 7 days, have you experienced any of the following?  New or Increased Pain, New or Increased Fatigue, Loneliness, Social Isolation, Stress or Anger?: (!) Stress  Do you get the social and emotional support that you need?: Yes  Do you have a Living Will?: Yes  Advance Directives     Power of  Living Will ACP-Advance Directive ACP-Power of     Not on File Not on File Not on File Not on [address] Risk Interventions:  · Stress: patient's comments regarding reasons for stress and/or anger: daughter is very ill.  Now living with patient and is home bound  · No Living Will: Patient declines ACP discussion/assistance    Health Habits/Nutrition:  Health Habits/Nutrition  Do you exercise for at least 20 minutes 2-3 times per week?: (!) No  Have you lost any weight without trying in the past 3 months?: No  Do you eat fewer than 2 meals per day?: No  Have you seen a dentist within the past year?: Yes     Health Habits/Nutrition Interventions:  · Inadequate physical activity:  patient is not ready to increase his/her physical activity level at this time       Personalized Preventive Plan   Current Health Maintenance Status  Immunization History   Administered Date(s) Administered    Influenza A ([id number]) Vaccine PF IM 01/11/2010    Influenza Vaccine, unspecified formulation 09/26/2015, 10/01/2015, 10/24/2016    Influenza Virus Vaccine 11/02/2013, 10/06/2014    Influenza, High Dose (Fluzone 65 yrs and older) 10/01/2015, 10/24/2016, 11/01/2017, 10/23/2019    Influenza, Quadv, IM, PF (6 mo and older Fluzone, Flulaval, Fluarix, and 3 yrs and older Afluria) 10/15/2018    Influenza, Triv, inactivated, subunit, adjuvanted, IM (Fluad 65 yrs and older) 10/15/2018    PPD Test 01/09/2012    Pneumococcal Conjugate 13-valent ([id number]) 11/14/2016    Pneumococcal Polysaccharide ([id number]) 01/21/2016    Td, unspecified formulation 10/19/2016    Zoster Live (Zostavax) 11/02/2013    Zoster Recombinant (Shingrix) 10/18/2019, 12/19/2019        Health Maintenance   Topic Date Due    DTaP/Tdap/Td vaccine (1 - Tdap) 12/16/1969    Annual Wellness Visit (AWV)  12/07/2020    Low dose CT lung screening  03/04/2021    Lipid screen  12/24/2021    TSH testing 12/24/2021    Potassium monitoring  12/24/2021    Creatinine monitoring  12/24/2021    Breast cancer screen  02/25/2022    Colon cancer screen colonoscopy  05/01/2022    DEXA (modify frequency per FRAX score)  Completed    Flu vaccine  Completed    Shingles Vaccine  Completed    Pneumococcal 65+ years Vaccine  Completed    Hepatitis C screen  Completed    Hepatitis A vaccine  Aged Out    Hepatitis B vaccine  Aged Out    Hib vaccine  Aged Out    Meningococcal (ACWY) vaccine  Aged Out     Recommendations for [employer] Due: see orders and patient instructions/AVS.  . Recommended screening schedule for the next 5-10 years is provided to the patient in written form: see Patient Instructions/AVS.    There are no diagnoses linked to this encounter. Fara Langley is a 79 y.o. female being evaluated by a Virtual Visit (phone) encounter to address concerns as mentioned above. A caregiver was present when appropriate. Due to this being a TeleHealth encounter (During [facility]- public health emergency), evaluation of the following organ systems was limited: Vitals/Constitutional/EENT/Resp/CV/GI//MS/Neuro/Skin/Heme-Lymph-Imm. Pursuant to the emergency declaration under the [address] [place] [address], [address] [place] [address] authority and the [employer] and [employer] Act, this Virtual Visit was conducted with patient's (and/or legal guardian's) consent, to reduce the patient's risk of exposure to COVID-19 and provide necessary medical care. The patient (and/or legal guardian) has also been advised to contact this office for worsening conditions or problems, and seek emergency medical treatment and/or call 911 if deemed necessary. Patient identification was verified at the start of the visit: Yes    Services were provided through phone to substitute for in-person clinic visit.  Patient and provider were located at their individual homes.    --Gabriel Garland MD on 1/27/2021 at 12:28 PM    An electronic signature was used to authenticate this note. Advance Care Planning   Advanced Care Planning: Discussed the patients choices for care and treatment in case of a health event that adversely affects decision-making abilities. Also discussed the patients long-term treatment options. Reviewed with the patient the [address] [place] [address] of [address] [place] [address] Declaration forms  Reviewed the process of designating a competent adult as an Agent (or -in-fact) that could take make health care decisions for the patient if incompetent. Patient was asked to complete the declaration forms, either acknowledge the forms by a public notary or an eligible witness and provide a signed copy to the practice office. Time spent (minutes): 10    Cardiovascular Disease Risk Counseling: Assessed the patient's risk to develop cardiovascular disease and reviewed main risk factors. Reviewed steps to reduce disease risk including:   · Quitting tobacco use, reducing amount smoked, or not starting the habit  · Making healthy food choices  · Being physically active and gradualy increasing activity levels   · Reduce weight and determine a healthy BMI goal  · Monitor blood pressure and treat if higher than 140/90 mmHg  · Maintain blood total cholesterol levels under 5 mmol/l or 190 mg/dl  · Maintain LDL cholesterol levels under 3.0 mmol/l or 115 mg/dl   · Control blood glucose levels  · Consider taking aspirin (75 mg daily), once blood pressure is controlled   Provided a follow up plan.   Time spent (minutes): 5

## 2021-01-29 DIAGNOSIS — I10 ESSENTIAL HYPERTENSION: ICD-10-CM

## 2021-01-29 DIAGNOSIS — E78.2 MIXED HYPERLIPIDEMIA: ICD-10-CM

## 2021-01-29 RX ORDER — PITAVASTATIN CALCIUM 1.04 MG/1
0.5 TABLET, FILM COATED ORAL NIGHTLY
Qty: 15 TABLET | Refills: 11 | Status: SHIPPED | OUTPATIENT
Start: 2021-01-29 | End: 2021-03-01 | Stop reason: SDUPTHER

## 2021-03-01 ENCOUNTER — HOSPITAL ENCOUNTER (OUTPATIENT)
Dept: WOMENS IMAGING | Age: 71
Discharge: HOME | End: 2021-03-03
Payer: MEDICARE

## 2021-03-01 DIAGNOSIS — I10 ESSENTIAL HYPERTENSION: ICD-10-CM

## 2021-03-01 DIAGNOSIS — E78.2 MIXED HYPERLIPIDEMIA: ICD-10-CM

## 2021-03-01 DIAGNOSIS — Z12.31 VISIT FOR SCREENING MAMMOGRAM: ICD-10-CM

## 2021-03-01 PROCEDURE — 77063 BREAST TOMOSYNTHESIS BI: CPT

## 2021-03-01 RX ORDER — PITAVASTATIN CALCIUM 1.04 MG/1
0.5 TABLET, FILM COATED ORAL NIGHTLY
Qty: 15 TABLET | Refills: 11 | Status: SHIPPED | OUTPATIENT
Start: 2021-03-01 | End: 2021-09-16 | Stop reason: SDUPTHER

## 2021-03-01 NOTE — TELEPHONE ENCOUNTER
Patient requesting medication refill.  Please approve or deny this request.    Rx requested:  Requested Prescriptions      No prescriptions requested or ordered in this encounter         Last Office Visit:   1/11/2021      Next Visit Date:  Future Appointments   Date Time Provider Lashonda Villagran   5/11/2021  1:45 PM Milka Rehman MD [facility]

## 2021-03-11 ENCOUNTER — TELEPHONE (OUTPATIENT)
Dept: FAMILY MEDICINE CLINIC | Age: 71
End: 2021-03-11

## 2021-03-12 DIAGNOSIS — M81.0 SENILE OSTEOPOROSIS: Primary | ICD-10-CM

## 2021-03-19 RX ORDER — DILTIAZEM HYDROCHLORIDE 60 MG/1
60 TABLET, FILM COATED ORAL 2 TIMES DAILY
Qty: 180 TABLET | Refills: 3 | Status: SHIPPED | OUTPATIENT
Start: 2021-03-19 | End: 2022-03-17

## 2021-03-23 ENCOUNTER — HOSPITAL ENCOUNTER (OUTPATIENT)
Dept: WOMENS IMAGING | Age: 71
Discharge: HOME | End: 2021-03-25
Payer: MEDICARE

## 2021-03-23 DIAGNOSIS — M81.0 SENILE OSTEOPOROSIS: ICD-10-CM

## 2021-03-23 PROCEDURE — 77080 DXA BONE DENSITY AXIAL: CPT

## 2021-03-29 ENCOUNTER — TELEPHONE (OUTPATIENT)
Dept: FAMILY MEDICINE CLINIC | Age: 71
End: 2021-03-29

## 2021-03-29 NOTE — TELEPHONE ENCOUNTER
The bone density test shows osteopenia. This is a stage before osteoporosis starts. Bones that are osteopenic or osteoporotic are at risk for easily fracturing. I recommend taking at least 1000 iu of Vit D a day and 1000 mg of Calcium a day. Medication can be started to slow the process down and decrease the chance of fracture. I can call in this medication or the patient can come in to discuss this. Re check bone density in 2 years.

## 2021-03-31 ENCOUNTER — TELEPHONE (OUTPATIENT)
Dept: FAMILY MEDICINE CLINIC | Age: 71
End: 2021-03-31

## 2021-03-31 NOTE — TELEPHONE ENCOUNTER
Patient called checking on the status of the [facility] inhaler. It is requiring a PA. The pharmacy told her they faxed paperwork to our office.

## 2021-04-01 RX ORDER — BUDESONIDE AND FORMOTEROL FUMARATE DIHYDRATE 160; 4.5 UG/1; UG/1
2 AEROSOL RESPIRATORY (INHALATION) 2 TIMES DAILY
Qty: 1 INHALER | Refills: 3 | Status: SHIPPED | OUTPATIENT
Start: 2021-04-01 | End: 2021-11-29 | Stop reason: SDUPTHER

## 2021-04-01 NOTE — TELEPHONE ENCOUNTER
Insurance will not pay for Dulera 200-5. I spoke to patient she would like to have inhaler changed to what insurance covers. We did receive fax from [employer] and alternatives are,  Symbicort, Advair, Breo, Anoro, Bevespil, Arnuity, flovent, Pulmicrt Serevent or Trelegy. Please advise and send new RX to [address] [place] [address] rd please.

## 2021-04-05 DIAGNOSIS — G47.00 INSOMNIA, UNSPECIFIED TYPE: ICD-10-CM

## 2021-04-05 DIAGNOSIS — F41.1 GENERALIZED ANXIETY DISORDER: ICD-10-CM

## 2021-04-05 RX ORDER — ALPRAZOLAM 0.5 MG/1
TABLET ORAL
Qty: 30 TABLET | Refills: 2 | Status: SHIPPED | OUTPATIENT
Start: 2021-04-05 | End: 2021-07-02

## 2021-04-05 NOTE — TELEPHONE ENCOUNTER
Harini Harrington called requesting medication refill.      Rx requested:  Requested Prescriptions     Pending Prescriptions Disp Refills    ALPRAZolam (XANAX) 0.5 MG tablet 30 tablet 2     Sig: TAKE 1 TABLET BY MOUTH NIGHTLY as needed       Last Office Visit:   1/27/2021    Last Tox screen:    Last Medication contract:    Next Visit Date:  Future Appointments   Date Time Provider Lashonda Villagran   5/12/2021 12:30 PM Fidel Jeans, MD [facility]

## 2021-05-01 ENCOUNTER — OFFICE VISIT (OUTPATIENT)
Dept: FAMILY MEDICINE CLINIC | Age: 71
End: 2021-05-01
Payer: MEDICARE

## 2021-05-01 VITALS
OXYGEN SATURATION: 97 % | HEIGHT: 65 IN | HEART RATE: 72 BPM | TEMPERATURE: 97.6 F | WEIGHT: 120 LBS | BODY MASS INDEX: 19.99 KG/M2 | DIASTOLIC BLOOD PRESSURE: 76 MMHG | SYSTOLIC BLOOD PRESSURE: 116 MMHG

## 2021-05-01 DIAGNOSIS — L02.91 ABSCESS: Primary | ICD-10-CM

## 2021-05-01 PROCEDURE — 99213 OFFICE O/P EST LOW 20 MIN: CPT | Performed by: NURSE PRACTITIONER

## 2021-05-01 RX ORDER — SULFAMETHOXAZOLE AND TRIMETHOPRIM 800; 160 MG/1; MG/1
1 TABLET ORAL 2 TIMES DAILY
Qty: 14 TABLET | Refills: 0 | Status: SHIPPED | OUTPATIENT
Start: 2021-05-01 | End: 2021-05-08

## 2021-05-01 ASSESSMENT — ENCOUNTER SYMPTOMS
VOMITING: 0
COUGH: 0
DIARRHEA: 0
SORE THROAT: 0
NAUSEA: 0
RHINORRHEA: 0
SHORTNESS OF BREATH: 0
WHEEZING: 0

## 2021-05-01 NOTE — PROGRESS NOTES
Subjective:      Patient ID: Roxi Sotelo is a 79 y.o. female who presents today for:  Chief Complaint   Patient presents with    Other     Patient here for abscess that comes and goes,  started 5 days ago        HPI    She has about 4 cysts that act up randomly   The one on the front of her chest was like a large pimple  She picked it and tried to pop it  There was white thick drainage from it   She over did it and irritated the muscle and skin around it  No fever or chills   For three days now its been waxing and waning   At one point surrounding skin was red but not anymore         Past Medical History:   Diagnosis Date    [facility]     Anxiety     Asthma     Cancer ([facility] 2018    squamous cell- eye brow - CC    Chronic constipation     Colon polyps     COPD (chronic obstructive pulmonary disease) ([facility]     Cyst of nipple     Endometriosis     Fibromyalgia     Goiter     Goiter     Hyperlipidemia     Hypertension     Hypothyroidism     Osteoarthritis     Thyroid ca ([facility]     TMJ (dislocation of temporomandibular joint)     TMJ arthritis      Past Surgical History:   Procedure Laterality Date    COLONOSCOPY      DIAGNOSTIC CARDIAC CATH LAB PROCEDURE  04/18/2019    THYROIDECTOMY      THYROIDECTOMY Left 1/6/2015     Social History     Socioeconomic History    Marital status:      Spouse name: Not on file    Number of children: Not on file    Years of education: Not on file    Highest education level: Not on file   Occupational History    Not on file   Social Needs    Financial resource strain: Not on file    Food insecurity     Worry: Not on file     Inability: Not on file   [employer] needs     Medical: Not on file     Non-medical: Not on file   Tobacco Use    Smoking status: Current Some Day Smoker     Packs/day: 1.00     Years: 45.00     Pack years: 45.00     Types: Cigarettes     Start date: 1966    Smokeless tobacco: Never Used    Tobacco comment: 9/23/19 now smoking 1 cig twice per day, had quit for 7 years at one point   Substance and Sexual Activity    Alcohol use: Yes    Drug use: No    Sexual activity: Yes     Partners: Male   Lifestyle    Physical activity     Days per week: Not on file     Minutes per session: Not on file    Stress: Not on file   Relationships    Social connections     Talks on phone: Not on file     Gets together: Not on file     Attends [employer] service: Not on file     Active member of club or organization: Not on file     Attends meetings of clubs or organizations: Not on file     Relationship status: Not on file    Intimate partner violence     Fear of current or ex partner: Not on file     Emotionally abused: Not on file     Physically abused: Not on file     Forced sexual activity: Not on file   Other Topics Concern    Not on file   Social History Narrative    Not on file     Family History   Problem Relation Age of Onset    Heart Disease Mother 61        CABG    Cancer Mother [de-identified]        lung    High Blood Pressure Mother     Heart Disease Father 48    High Blood Pressure Maternal Grandmother     High Blood Pressure Paternal Grandmother     Heart Attack Neg Hx     Hypertension Neg Hx     Diabetes Neg Hx      Allergies   Allergen Reactions    Morphine      Tremor    Amoxil [Amoxicillin]      diarrhea    Cefdinir Itching and Other (See Comments)    Fosamax [Alendronate] Other (See Comments)     Muscle aches      Pantoprazole Other (See Comments)     abdominal cramping, uncontrolled BM.  Simvastatin      muscle soreness.     Zetia [Ezetimibe]     Zocor [Simvastatin - High Dose]     Adhesive Tape Rash     Current Outpatient Medications   Medication Sig Dispense Refill    sulfamethoxazole-trimethoprim (BACTRIM DS;SEPTRA DS) 800-160 MG per tablet Take 1 tablet by mouth 2 times daily for 7 days 14 tablet 0    ALPRAZolam (XANAX) 0.5 MG tablet TAKE 1 TABLET BY MOUTH NIGHTLY as needed 30 tablet 2    tablet     Refill:  0     There are no discontinued medications. Return if symptoms worsen or fail to improve. Reviewed with the patient/family: current clinical status & medications. Side effects of the medication prescribed today, as well as treatment plan/rationale and result expectations have been discussed with the patient/family who expresses understanding. Patient will be discharged home in stable condition. Follow up with PCP to evaluate treatment results or return if symptoms worsen or fail to improve. Discussed signs and symptoms which require immediate follow-up in ED/call to 911. Understanding verbalized. I have reviewed the patient's medical history in detail and updated the computerized patient record.     Linda Michele, APRN - CNP

## 2021-05-01 NOTE — PATIENT INSTRUCTIONS
Patient Education        Skin Abscess: Care Instructions  Your Care Instructions     A skin abscess is a bacterial infection that forms a pocket of pus. A boil is a kind of skin abscess. The doctor may have cut an opening in the abscess so that the pus can drain out. You may have gauze in the cut so that the abscess will stay open and keep draining. You may need antibiotics. You will need to follow up with your doctor to make sure the infection has gone away. The doctor has checked you carefully, but problems can develop later. If you notice any problems or new symptoms, get medical treatment right away. Follow-up care is a key part of your treatment and safety. Be sure to make and go to all appointments, and call your doctor if you are having problems. It's also a good idea to know your test results and keep a list of the medicines you take. How can you care for yourself at home? · Apply warm and dry compresses, a heating pad set on low, or a hot water bottle 3 or 4 times a day for pain. Keep a cloth between the heat source and your skin. · If your doctor prescribed antibiotics, take them as directed. Do not stop taking them just because you feel better. You need to take the full course of antibiotics. · Take pain medicines exactly as directed. ? If the doctor gave you a prescription medicine for pain, take it as prescribed. ? If you are not taking a prescription pain medicine, ask your doctor if you can take an over-the-counter medicine. · Keep your bandage clean and dry. Change the bandage whenever it gets wet or dirty, or at least one time a day. · If the abscess was packed with gauze:  ? Keep follow-up appointments to have the gauze changed or removed. If the doctor instructed you to remove the gauze, follow the instructions you were given for how to remove it. ? After the gauze is removed, soak the area in warm water for 15 to 20 minutes 2 times a day, until the wound closes.   When should you call Check the information that comes with your medicine. Should you take antibiotics just in case? Don't take antibiotics when you don't need them. If you do that, they may not work when you do need them. Each time you take antibiotics, you are more likely to have some bacteria that survive and aren't killed by the medicine. Bacteria that don't die can change and become even harder to kill. These are called antibiotic-resistant bacteria. They can cause longer and more serious infections. To treat them, you may need different, stronger antibiotics that have more side effects and may cost more. So always ask your doctor if antibiotics are the best treatment. Explain that you do not want antibiotics unless you need them. Help protect the community  Using antibiotics when they're not needed leads to the development of antibiotic-resistant bacteria. These tougher bacteria can spread to family members, children, and coworkers. People in your community will have a risk of getting an infection that is harder to cure and that costs more to treat. How can you take antibiotics safely? Be safe with medicine. Take your antibiotics as directed. Do not stop taking them just because you feel better. You need to take the full course of medicine. This will help make sure your infection is cured. It will also help prevent the growth of antibiotic-resistant bacteria. Always take the exact amount that the label says to take. If the label says to take the medicine at a certain time, follow those directions. You might feel better after you take an antibiotic for a few days. But it is important to keep taking it for as long as prescribed. That will help you get rid of those bacteria that are a bit stronger and that survive the first few days of treatment. Where can you learn more? Go to https://lawson.[employer] org and sign in to your [employer] account.  Enter C892 in the [employer] box to learn more about \"Learning About the Safe Use of Antibiotics. \"     If you do not have an account, please click on the \"Sign Up Now\" link. Current as of: September 23, 2020               Content Version: 12.8  © 2006-2021 Healthwise, Incorporated. Care instructions adapted under license by [facility] ([facility]). If you have questions about a medical condition or this instruction, always ask your healthcare professional. [place] [address] any warranty or liability for your use of this information.

## 2021-05-12 ENCOUNTER — VIRTUAL VISIT (OUTPATIENT)
Dept: CARDIOLOGY CLINIC | Age: 71
End: 2021-05-12
Payer: MEDICARE

## 2021-05-12 DIAGNOSIS — I73.9 CLAUDICATION (HCC): ICD-10-CM

## 2021-05-12 DIAGNOSIS — I10 ESSENTIAL HYPERTENSION: ICD-10-CM

## 2021-05-12 DIAGNOSIS — E78.5 HYPERLIPIDEMIA, UNSPECIFIED HYPERLIPIDEMIA TYPE: ICD-10-CM

## 2021-05-12 DIAGNOSIS — E78.2 MIXED HYPERLIPIDEMIA: Primary | ICD-10-CM

## 2021-05-12 DIAGNOSIS — I65.23 BILATERAL CAROTID ARTERY STENOSIS: ICD-10-CM

## 2021-05-12 DIAGNOSIS — I25.10 CORONARY ARTERY DISEASE INVOLVING NATIVE CORONARY ARTERY OF NATIVE HEART WITHOUT ANGINA PECTORIS: ICD-10-CM

## 2021-05-12 PROCEDURE — 99214 OFFICE O/P EST MOD 30 MIN: CPT | Performed by: INTERNAL MEDICINE

## 2021-05-12 ASSESSMENT — ENCOUNTER SYMPTOMS
WHEEZING: 0
SHORTNESS OF BREATH: 0
COUGH: 0
RESPIRATORY NEGATIVE: 1
CHEST TIGHTNESS: 0
STRIDOR: 0
GASTROINTESTINAL NEGATIVE: 1
NAUSEA: 0
BLOOD IN STOOL: 0
EYES NEGATIVE: 1

## 2021-05-12 NOTE — PROGRESS NOTES
Subsequent Progress Note  Patient: Nasir Mills  YOB: 1950  MRN: [id number]    Chief Complaint: carotid htn carotid cad  Chief Complaint   Patient presents with    Coronary Artery Disease       CV Data:  2/2017 spect negative ccf  2/2017 PVR negative ccf  1/2019 CUS extensive atherosclerosis R>L  1/19 echo ef 60   4/5/2019 CTA neck - CHERELLE 25%  LICA 34  5/83/4539 CATH LAD Mild RCA 20% EF 55 EDP 5   S/P Complete Thyroidectomy   12/2020 PVR negative  12/2020 CUS CHERELLE 50-69%     Subjective/HPI: no cp no sob no falls no bleed tolerating only 1/2 tab Livalo. Full tab caused severe myalgia. LDL not to goal.      1/13/2020 no cp no sob no falls no bleed f/u CT chest mild enlarged R nodule. 12/7/2020 still smokes. No cp no palp no falls no bleeed no sob    1/11/21 TELEHEALTH EVALUATION -- Audio/Visual (During [facility] public health emergency)    No cp no sob no falls no bleed.  Eats well still smokes    5/12/21 TELEHEALTH EVALUATION -- Audio/Visual (During [place] [address] public health emergency)    No cp no sob no falls no bleed take smeds doing well        Smokes 1/2 ppd   Retired   EKG: SR 58    Past Medical History:   Diagnosis Date    A-fib ([address]     Anxiety     Asthma     Cancer ([facility] [address] 2018    squamous cell- eye brow - CC    Chronic constipation     Colon polyps     COPD (chronic obstructive pulmonary disease) (HCC)     Cyst of nipple     Endometriosis     Fibromyalgia     Goiter     Goiter     Hyperlipidemia     Hypertension     Hypothyroidism     Osteoarthritis     Thyroid ca ([address]     TMJ (dislocation of temporomandibular joint)     TMJ arthritis        Past Surgical History:   Procedure Laterality Date    COLONOSCOPY      DIAGNOSTIC CARDIAC CATH LAB PROCEDURE  04/18/2019    THYROIDECTOMY      THYROIDECTOMY Left 1/6/2015       Family History   Problem Relation Age of Onset    Heart Disease Mother 61        CABG    Cancer Mother [de-identified]        lung    High Blood Pressure Mother     Heart Disease Father 48    High Blood Pressure Maternal Grandmother     High Blood Pressure Paternal Grandmother     Heart Attack Neg Hx     Hypertension Neg Hx     Diabetes Neg Hx        Social History     Socioeconomic History    Marital status:      Spouse name: Not on file    Number of children: Not on file    Years of education: Not on file    Highest education level: Not on file   Occupational History    Not on file   Social Needs    Financial resource strain: Not on file    Food insecurity     Worry: Not on file     Inability: Not on file   [employer] needs     Medical: Not on file     Non-medical: Not on file   Tobacco Use    Smoking status: Current Some Day Smoker     Packs/day: 1.00     Years: 45.00     Pack years: 45.00     Types: Cigarettes     Start date: 1966    Smokeless tobacco: Never Used    Tobacco comment: 9/23/19 now smoking 1 cig twice per day, had quit for 7 years at one point   Substance and Sexual Activity    Alcohol use:  Yes    Drug use: No    Sexual activity: Yes     Partners: Male   Lifestyle    Physical activity     Days per week: Not on file     Minutes per session: Not on file    Stress: Not on file   Relationships    Social connections     Talks on phone: Not on file     Gets together: Not on file     Attends Spiritism service: Not on file     Active member of club or organization: Not on file     Attends meetings of clubs or organizations: Not on file     Relationship status: Not on file    Intimate partner violence     Fear of current or ex partner: Not on file     Emotionally abused: Not on file     Physically abused: Not on file     Forced sexual activity: Not on file   Other Topics Concern    Not on file   Social History Narrative    Not on file       Allergies   Allergen Reactions    Morphine      Tremor    Amoxil [Amoxicillin]      diarrhea    Cefdinir Itching and Other (See Comments)    Fosamax [Alendronate] Other (See Comments)     Muscle aches      Pantoprazole Other (See Comments)     abdominal cramping, uncontrolled BM.  Simvastatin      muscle soreness.  Zetia [Ezetimibe]     Zocor [Simvastatin - High Dose]     Adhesive Tape Rash       Current Outpatient Medications   Medication Sig Dispense Refill    ALPRAZolam (XANAX) 0.5 MG tablet TAKE 1 TABLET BY MOUTH NIGHTLY as needed 30 tablet 2    budesonide-formoterol (SYMBICORT) 160-4.5 MCG/ACT AERO Inhale 2 puffs into the lungs 2 times daily 1 Inhaler 3    dilTIAZem (CARDIZEM) 60 MG tablet Take 1 tablet by mouth 2 times daily 180 tablet 3    pitavastatin (LIVALO) 1 MG TABS tablet Take 0.5 tablets by mouth nightly 15 tablet 11    montelukast (SINGULAIR) 10 MG tablet Take 10 mg by mouth nightly      mupirocin (BACTROBAN) 2 % ointment Apply topically 2 times daily      citalopram (CELEXA) 40 MG tablet Take 1 tablet by mouth once daily 90 tablet 2    mometasone-formoterol (DULERA) 200-5 MCG/ACT inhaler INHALE 1 PUFF BY MOUTH EVERY 12 HOURS AS DIRECTED 13 g 5    fexofenadine (ALLEGRA) 180 MG tablet Take 180 mg by mouth daily      fluticasone (FLONASE) 50 MCG/ACT nasal spray USE 1 SPRAY INTO EACH NOSTRIL ONCE DAILY 1 Bottle 2    aspirin 81 MG tablet Take 1 tablet by mouth daily With Food 30 tablet 3    albuterol sulfate  (90 Base) MCG/ACT inhaler 2 puffs every 6 hours 1 Inhaler 5    meclofenamate (MECLOMEN) 100 MG capsule TAKE 1 CAPSULE BY MOUTH DAILY AS NEEDED 90 capsule 0    conjugated estrogens (PREMARIN) 0.625 MG/GM vaginal cream Place vaginally daily. 1 Tube 3    levothyroxine (SYNTHROID) 50 MCG tablet Take 50 mcg by mouth daily On Tuesday and Thursday; Saturday  she takes 2 tabs. On Monday,Wednesday,Friday she takes 2 1/2 tabs  3     No current facility-administered medications for this visit. Review of Systems:   Review of Systems   Constitutional: Negative. Negative for diaphoresis and fatigue. HENT: Negative. Eyes: Negative. Respiratory: Negative. Negative for cough, chest tightness, shortness of breath, wheezing and stridor. Cardiovascular: Negative. Negative for chest pain, palpitations and leg swelling. Gastrointestinal: Negative. Negative for blood in stool and nausea. Genitourinary: Negative. Musculoskeletal: Negative. Skin: Negative. Neurological: Negative. Negative for dizziness, syncope, weakness and light-headedness. Hematological: Negative. Psychiatric/Behavioral: Negative.           Physical Examination:    LMP 01/09/1995    Physical Exam    LABS:  CBC:   Lab Results   Component Value Date    WBC 4.6 12/24/2020    RBC 4.65 12/24/2020    HGB 14.4 12/24/2020    HCT 43.0 12/24/2020    MCV 92.4 12/24/2020    MCH 30.9 12/24/2020    MCHC 33.5 12/24/2020    RDW 14.0 12/24/2020     12/24/2020    MPV 9.6 10/09/2014     Lipids:  Lab Results   Component Value Date    CHOL 138 11/13/2019    CHOL 219 (H) 05/14/2019    CHOL 256 (H) 02/05/2019     Lab Results   Component Value Date    TRIG 160 (H) 11/13/2019    TRIG 104 05/14/2019    TRIG 116 02/05/2019     Lab Results   Component Value Date    HDL 76 (H) 12/24/2020    HDL 73 (H) 11/13/2019    HDL 76 (H) 05/14/2019     Lab Results   Component Value Date    LDLCALC 94 12/24/2020    LDLCALC 33 11/13/2019    LDLCALC 122 05/14/2019     No results found for: LABVLDL, VLDL  No results found for: CHOLHDLRATIO  CMP:    Lab Results   Component Value Date     12/24/2020    K 4.4 12/24/2020     12/24/2020    CO2 27 12/24/2020    BUN 13 12/24/2020    CREATININE 0.67 12/24/2020    GFRAA >60.0 12/24/2020    LABGLOM >60.0 12/24/2020    GLUCOSE 85 12/24/2020    PROT 6.8 12/24/2020    LABALBU 4.2 12/24/2020    CALCIUM 9.3 12/24/2020    BILITOT 0.3 12/24/2020    ALKPHOS 109 12/24/2020    AST 21 12/24/2020    ALT 17 12/24/2020     BMP:    Lab Results   Component Value Date     12/24/2020    K 4.4 12/24/2020     12/24/2020    CO2 27 12/24/2020    BUN 13 12/24/2020    LABALBU 4.2 12/24/2020    CREATININE 0.67 12/24/2020    CALCIUM 9.3 12/24/2020    GFRAA >60.0 12/24/2020    LABGLOM >60.0 12/24/2020    GLUCOSE 85 12/24/2020     Magnesium:    Lab Results   Component Value Date    MG 2.1 12/24/2020     TSH:  Lab Results   Component Value Date    TSH 2.760 12/24/2020       Patient Active Problem List   Diagnosis    Goiter    [facility]    GERD (gastroesophageal reflux disease)    Mixed hyperlipidemia    Hypothyroidism    Generalized anxiety disorder    Tobacco abuse    Intra-abdominal hematoma    Chest pain    Bilateral carotid bruits    Essential hypertension    Atrial fibrillation ([facility] [address]    Coronary artery disease involving native coronary artery of native heart without angina pectoris    Hyperlipidemia    Bilateral carotid artery stenosis    Simple chronic bronchitis ([facility])    Angina effort       There are no discontinued medications. Modified Medications    No medications on file       No orders of the defined types were placed in this encounter. Assessment/Plan:    1. Essential hypertension  Stable     2. Coronary artery disease involving native coronary artery of native heart without angina pectoris  No angina    3. Carotid - mild - CUS    4. R Lung nodule- repeat CT chesst scheduled for 6 mo. If worse at tthat time [facility] Pulmonary eval - stable in f/u. - now seeing [facility] Lung Doctor. 5.Claudication Cold feet - PVR - stable      Counseling:  Heart Healthy Lifestyle, Stop Smoking, Low Salt Diet, Take Precautions to Prevent Falls, Regular Exercise and Walk Daily    Return in about 4 months (around 9/12/2021).   Pursuant to the emergency declaration under the [facility], [address] [place] [address] and the [employer] and [employer] Act, this Virtual Visit was conducted, with patient's consent, to reduce the patient's risk of exposure to COVID-19 and provide continuity of care for an established patient. Services were provided through a video synchronous discussion virtually to substitute for in-person clinic visit. Visit completed using [employer] me. Patient was located at home and I was located in the clinic.     Electronically signed by Deonna Obregon MD on 5/12/2021 at 12:19 PM

## 2021-05-13 ENCOUNTER — OFFICE VISIT (OUTPATIENT)
Dept: FAMILY MEDICINE CLINIC | Age: 71
End: 2021-05-13
Payer: MEDICARE

## 2021-05-13 VITALS
SYSTOLIC BLOOD PRESSURE: 118 MMHG | TEMPERATURE: 97.8 F | OXYGEN SATURATION: 98 % | HEART RATE: 68 BPM | RESPIRATION RATE: 16 BRPM | HEIGHT: 65 IN | DIASTOLIC BLOOD PRESSURE: 70 MMHG | WEIGHT: 121 LBS | BODY MASS INDEX: 20.16 KG/M2

## 2021-05-13 DIAGNOSIS — L72.3 SEBACEOUS CYST: Primary | ICD-10-CM

## 2021-05-13 DIAGNOSIS — Z79.899 ENCOUNTER FOR LONG-TERM (CURRENT) USE OF MEDICATIONS: ICD-10-CM

## 2021-05-13 PROCEDURE — 99213 OFFICE O/P EST LOW 20 MIN: CPT | Performed by: FAMILY MEDICINE

## 2021-05-13 RX ORDER — ALBUTEROL SULFATE 0.63 MG/3ML
1 SOLUTION RESPIRATORY (INHALATION) EVERY 6 HOURS PRN
COMMUNITY

## 2021-05-13 SDOH — ECONOMIC STABILITY: TRANSPORTATION INSECURITY
IN THE PAST 12 MONTHS, HAS THE LACK OF TRANSPORTATION KEPT YOU FROM MEDICAL APPOINTMENTS OR FROM GETTING MEDICATIONS?: NO

## 2021-05-13 NOTE — PROGRESS NOTES
Patient: Navneet Feldman (: 1950) is a 79 y.o. female,Established patient, here for evaluation of the following chief complaint(s):  Chief Complaint   Patient presents with    Follow-Up from [address] [place] [address]     She was seen at [facility] walk in clinic on 2021 for Abscess on right side of chest.        Date: 21    Allergies   Allergen Reactions    Morphine      Tremor    Amoxil [Amoxicillin]      diarrhea    Cefdinir Itching and Other (See Comments)    Fosamax [Alendronate] Other (See Comments)     Muscle aches      Pantoprazole Other (See Comments)     abdominal cramping, uncontrolled BM.  Simvastatin      muscle soreness.  Zetia [Ezetimibe]     Zocor [Simvastatin - High Dose]     Adhesive Tape Rash       Vitals:    21 0843   BP: 118/70   Site: Right Upper Arm   Position: Sitting   Cuff Size: Small Adult   Pulse: 68   Resp: 16   Temp: 97.8 °F (36.6 °C)   TempSrc: Oral   SpO2: 98%   Weight: 121 lb (54.9 kg)   Height: 5' 5\" (1.651 m)      Body mass index is 20.14 kg/m². PHQ Scores 2021 2020 2019 2/15/2018 2017 2016 2014   PHQ2 Score 1 0 0 0 0 1 0   PHQ9 Score 1 0 0 0 0 1 0     Interpretation of Total Score Depression Severity: 1-4 = Minimal depression, 5-9 = Mild depression, 10-14 = Moderate depression, 15-19 = Moderately severe depression, 20-27 = Severe depression    HPI    She is following up on a cyst on her chest that had gotten infected. She was placed on Bactrim and the inflammation has resolved but the cyst is still fairly prominent. She has had this in the past but is never gotten infected. She has had other cyst removed in her body in the past.  She will be seeing her dermatologist within the next week and she will have them take it off. She is otherwise without complaints. Review of Systems    Constitutional: Negative for fatigue, fever and sweats. HEENT: Negative for eye discharge and vision loss.  Negative for ear drainage, hearing loss and 30 tablet 2    budesonide-formoterol (SYMBICORT) 160-4.5 MCG/ACT AERO Inhale 2 puffs into the lungs 2 times daily 1 Inhaler 3    dilTIAZem (CARDIZEM) 60 MG tablet Take 1 tablet by mouth 2 times daily 180 tablet 3    pitavastatin (LIVALO) 1 MG TABS tablet Take 0.5 tablets by mouth nightly 15 tablet 11    montelukast (SINGULAIR) 10 MG tablet Take 10 mg by mouth nightly      citalopram (CELEXA) 40 MG tablet Take 1 tablet by mouth once daily 90 tablet 2    fexofenadine (ALLEGRA) 180 MG tablet Take 180 mg by mouth daily      fluticasone (FLONASE) 50 MCG/ACT nasal spray USE 1 SPRAY INTO EACH NOSTRIL ONCE DAILY 1 Bottle 2    aspirin 81 MG tablet Take 1 tablet by mouth daily With Food 30 tablet 3    albuterol sulfate  (90 Base) MCG/ACT inhaler 2 puffs every 6 hours 1 Inhaler 5    meclofenamate (MECLOMEN) 100 MG capsule TAKE 1 CAPSULE BY MOUTH DAILY AS NEEDED 90 capsule 0    conjugated estrogens (PREMARIN) 0.625 MG/GM vaginal cream Place vaginally daily. 1 Tube 3    levothyroxine (SYNTHROID) 50 MCG tablet Take 50 mcg by mouth daily On Tuesday and Thursday; Saturday  she takes 2 tabs. On Monday,Wednesday,Friday she takes 2 1/2 tabs  3     No current facility-administered medications for this visit. Orders Placed This Encounter   Procedures    Urine Drug Screen     Standing Status:   Future     Standing Expiration Date:   5/13/2022       No orders of the defined types were placed in this encounter. Return in about 6 months (around 11/13/2021). An electronic signature was used to authenticate this note.   Dr. Medina Kelley      5/13/21  9:26 AM

## 2021-07-02 DIAGNOSIS — F41.1 GENERALIZED ANXIETY DISORDER: ICD-10-CM

## 2021-07-02 DIAGNOSIS — G47.00 INSOMNIA, UNSPECIFIED TYPE: ICD-10-CM

## 2021-07-02 RX ORDER — ALPRAZOLAM 0.5 MG/1
TABLET ORAL
Qty: 30 TABLET | Refills: 2 | Status: SHIPPED | OUTPATIENT
Start: 2021-07-02 | End: 2021-09-29 | Stop reason: SDUPTHER

## 2021-07-02 NOTE — TELEPHONE ENCOUNTER
Requesting medication refill.  Please approve or deny this request.    Rx requested:  Requested Prescriptions     Pending Prescriptions Disp Refills    ALPRAZolam (XANAX) 0.5 MG tablet [Pharmacy Med Name: ALPRAZolam 0.5 MG Oral Tablet] 30 tablet 0     Sig: TAKE 1 TABLET BY MOUTH NIGHTLY AS NEEDED       Last Office Visit:   5/13/2021    Last Filled:      Last Labs:      Next Visit Date:  Future Appointments   Date Time Provider Lashonda Villagran   9/16/2021 12:45 PM You Rose MD [address] [place] [address]   11/11/2021 11:00 AM Mini Wiseman PA-C [address] [place] [address]

## 2021-07-20 RX ORDER — MECLOFENAMATE SODIUM 100 MG/1
CAPSULE ORAL
Qty: 90 CAPSULE | Refills: 0 | Status: SHIPPED | OUTPATIENT
Start: 2021-07-20

## 2021-08-25 NOTE — TELEPHONE ENCOUNTER
Patient calling for medication refill. Patient plans to establish care with GADIEL SINGH 5/13  Dr. Aditi Coburn.     Upcoming GADIEL GARCIA 11/11

## 2021-08-26 RX ORDER — CITALOPRAM 40 MG/1
TABLET ORAL
Qty: 90 TABLET | Refills: 1 | Status: SHIPPED | OUTPATIENT
Start: 2021-08-26 | End: 2022-03-09 | Stop reason: SDUPTHER

## 2021-09-11 ENCOUNTER — OFFICE VISIT (OUTPATIENT)
Dept: FAMILY MEDICINE CLINIC | Age: 71
End: 2021-09-11
Payer: MEDICARE

## 2021-09-11 VITALS
SYSTOLIC BLOOD PRESSURE: 126 MMHG | WEIGHT: 120 LBS | HEIGHT: 65 IN | BODY MASS INDEX: 19.99 KG/M2 | DIASTOLIC BLOOD PRESSURE: 72 MMHG | OXYGEN SATURATION: 95 % | TEMPERATURE: 97.4 F | HEART RATE: 61 BPM

## 2021-09-11 DIAGNOSIS — J01.01 ACUTE RECURRENT MAXILLARY SINUSITIS: Primary | ICD-10-CM

## 2021-09-11 PROBLEM — C44.02: Status: ACTIVE | Noted: 2018-11-05

## 2021-09-11 PROBLEM — C44.329 SQUAMOUS CELL CARCINOMA OF SKIN OF LEFT CHEEK: Status: ACTIVE | Noted: 2018-10-29

## 2021-09-11 PROBLEM — F17.200 SMOKER: Status: ACTIVE | Noted: 2017-06-30

## 2021-09-11 PROCEDURE — 99213 OFFICE O/P EST LOW 20 MIN: CPT | Performed by: PHYSICIAN ASSISTANT

## 2021-09-11 RX ORDER — GUAIFENESIN 600 MG/1
600 TABLET, EXTENDED RELEASE ORAL 2 TIMES DAILY
Qty: 30 TABLET | Refills: 0 | Status: SHIPPED | OUTPATIENT
Start: 2021-09-11 | End: 2021-09-27 | Stop reason: SDUPTHER

## 2021-09-11 RX ORDER — PREDNISONE 20 MG/1
TABLET ORAL
Qty: 11 TABLET | Refills: 0 | Status: SHIPPED | OUTPATIENT
Start: 2021-09-11 | End: 2021-09-20

## 2021-09-11 RX ORDER — DOXYCYCLINE HYCLATE 100 MG
100 TABLET ORAL 2 TIMES DAILY
Qty: 14 TABLET | Refills: 0 | Status: SHIPPED | OUTPATIENT
Start: 2021-09-11 | End: 2021-09-18

## 2021-09-11 NOTE — PROGRESS NOTES
Subjective:      Patient ID: Kyle Alba is a 79 y.o. female who presents today for:  Chief Complaint   Patient presents with    Sinus Problem     Patient is here c/o sinus problem. Pt states she has chills, sore throat, sinus drainage, sinus pressure, cough, and headache. Pt states issues have been ongoing for about 1.5 weeks. Pt states she has used OTC allergy medication with minimal relief. Pt presents w/chills, sore throat, post nasal drip, dizziness, productive cough. Pt is fully vaccinated and had recent negative covid test.  Pt is not interested in being tested for COVID again.       Past Medical History:   Diagnosis Date    [facility] [address]     Anxiety     Asthma     Cancer ([facility] [address] 2018    squamous cell- eye brow - CC    Chronic constipation     Colon polyps     COPD (chronic obstructive pulmonary disease) (HCC)     Cyst of nipple     Endometriosis     Fibromyalgia     Goiter     Goiter     Hyperlipidemia     Hypertension     Hypothyroidism     Osteoarthritis     Thyroid ca ([facility] [address] [facility]     TMJ (dislocation of temporomandibular joint)     TMJ arthritis      Past Surgical History:   Procedure Laterality Date    COLONOSCOPY      DIAGNOSTIC CARDIAC CATH LAB PROCEDURE  04/18/2019    THYROIDECTOMY      THYROIDECTOMY Left 1/6/2015     Family History   Problem Relation Age of Onset    Heart Disease Mother 61        CABG    Cancer Mother [de-identified]        lung    High Blood Pressure Mother     Heart Disease Father 48    High Blood Pressure Maternal Grandmother     High Blood Pressure Paternal Grandmother     Heart Attack Neg Hx     Hypertension Neg Hx     Diabetes Neg Hx      Social History     Socioeconomic History    Marital status:      Spouse name: Not on file    Number of children: Not on file    Years of education: Not on file    Highest education level: Not on file   Occupational History    Not on file   Tobacco Use    Smoking status: Current Some Day Smoker     Packs/day: 1.00     Years: 45.00     Pack years: 45.00     Types: Cigarettes     Start date: 1966    Smokeless tobacco: Never Used    Tobacco comment: 9/23/19 now smoking 1 cig twice per day, had quit for 7 years at one point   Substance and Sexual Activity    Alcohol use: Yes    Drug use: No    Sexual activity: Yes     Partners: Male   Other Topics Concern    Not on file   Social History Narrative    Not on file     Social Determinants of Health     Financial Resource Strain: Low Risk     Difficulty of Paying Living Expenses: Not hard at all   Food Insecurity: No Food Insecurity    Worried About Running Out of Food in the Last Year: Never true    [address] in the Last Year: Never true   Transportation Needs: No Transportation Needs    Lack of Transportation (Medical): No    Lack of Transportation (Non-Medical):  No   Physical Activity:     Days of Exercise per Week:     Minutes of Exercise per Session:    Stress:     Feeling of Stress :    Social Connections:     Frequency of Communication with Friends and Family:     Frequency of Social Gatherings with Friends and Family:     Attends [employer] Services:     Active Member of Clubs or Organizations:     Attends Club or Organization Meetings:     Marital Status:    Intimate Partner Violence:     Fear of Current or Ex-Partner:     Emotionally Abused:     Physically Abused:     Sexually Abused:      Current Outpatient Medications on File Prior to Visit   Medication Sig Dispense Refill    citalopram (CELEXA) 40 MG tablet Take 1 tablet by mouth once daily 90 tablet 1    meclofenamate (MECLOMEN) 100 MG capsule TAKE 1 CAPSULE BY MOUTH DAILY AS NEEDED 90 capsule 0    albuterol (ACCUNEB) 0.63 MG/3ML nebulizer solution Take 1 ampule by nebulization every 6 hours as needed for Wheezing      budesonide-formoterol (SYMBICORT) 160-4.5 MCG/ACT AERO Inhale 2 puffs into the lungs 2 times daily 1 Inhaler 3    dilTIAZem (CARDIZEM) 60 MG tablet Take 1 tablet by worsen or fail to improve, for follow up w/PCP in 1-2 weeks. Reviewed with the patient: current clinicalstatus, medications, activities and diet. Side effects, adverse effects of the medication prescribedtoday, as well as treatment plan/ rationale and result expectations have been discussedwith the patient who expresses understanding and desires to proceed. Close follow upto evaluate treatment results and for coordination of care. I have reviewedthe patient's medical history in detail and updated the computerized patient record.     Luis Fisher PA-C

## 2021-09-16 ENCOUNTER — OFFICE VISIT (OUTPATIENT)
Dept: CARDIOLOGY CLINIC | Age: 71
End: 2021-09-16
Payer: MEDICARE

## 2021-09-16 VITALS
SYSTOLIC BLOOD PRESSURE: 118 MMHG | DIASTOLIC BLOOD PRESSURE: 72 MMHG | WEIGHT: 117 LBS | RESPIRATION RATE: 16 BRPM | BODY MASS INDEX: 19.49 KG/M2 | HEART RATE: 66 BPM | OXYGEN SATURATION: 96 % | HEIGHT: 65 IN

## 2021-09-16 DIAGNOSIS — I10 ESSENTIAL HYPERTENSION: ICD-10-CM

## 2021-09-16 DIAGNOSIS — E78.2 MIXED HYPERLIPIDEMIA: ICD-10-CM

## 2021-09-16 DIAGNOSIS — I25.10 CORONARY ARTERY DISEASE INVOLVING NATIVE CORONARY ARTERY OF NATIVE HEART WITHOUT ANGINA PECTORIS: Primary | ICD-10-CM

## 2021-09-16 PROCEDURE — 99214 OFFICE O/P EST MOD 30 MIN: CPT | Performed by: INTERNAL MEDICINE

## 2021-09-16 RX ORDER — PITAVASTATIN CALCIUM 1.04 MG/1
0.5 TABLET, FILM COATED ORAL NIGHTLY
Qty: 45 TABLET | Refills: 3 | Status: SHIPPED | OUTPATIENT
Start: 2021-09-16 | End: 2022-09-23

## 2021-09-16 ASSESSMENT — ENCOUNTER SYMPTOMS
CHEST TIGHTNESS: 0
GASTROINTESTINAL NEGATIVE: 1
STRIDOR: 0
COUGH: 0
NAUSEA: 0
EYES NEGATIVE: 1
BLOOD IN STOOL: 0
RESPIRATORY NEGATIVE: 1
WHEEZING: 0
SHORTNESS OF BREATH: 0

## 2021-09-16 NOTE — PROGRESS NOTES
Subsequent Progress Note  Patient: Yelitza Beyer  YOB: 1950  MRN: [id number]    Chief Complaint: carotid htn carotid cad  Chief Complaint   Patient presents with    Follow-up     4 month    Coronary Artery Disease    Hypertension       CV Data:  2/2017 spect negative ccf  2/2017 PVR negative ccf  1/2019 CUS extensive atherosclerosis R>L  1/19 echo ef 60   4/5/2019 CTA neck - CHERELLE 58%  LICA 34  1/83/3841 CATH LAD Mild RCA 20% EF 55 EDP 5   S/P Complete Thyroidectomy   12/2020 PVR negative  12/2020 CUS CHERELLE 50-69%     Subjective/HPI: no cp no sob no falls no bleed tolerating only 1/2 tab Livalo. Full tab caused severe myalgia. LDL not to goal.      1/13/2020 no cp no sob no falls no bleed f/u CT chest mild enlarged R nodule. 12/7/2020 still smokes. No cp no palp no falls no bleeed no sob    1/11/21 TELEHEALTH EVALUATION -- Audio/Visual (During SGNNA-38 public health emergency)    No cp no sob no falls no bleed. Eats well still smokes    5/12/21 TELEHEALTH EVALUATION -- Audio/Visual (During YZEBC-33 public health emergency)    No cp no sob no falls no bleed take smeds doing well    9/16/21 now has bronchitis. No cp no sob no falls occ hemorrhoid bleed.  Takes med        Smokes 1/2 ppd   Retired   EKG: SR 58    Past Medical History:   Diagnosis Date    A-fib ([address]     Anxiety     Asthma     Cancer ([address] 2018    squamous cell- eye brow - CC    Chronic constipation     Colon polyps     COPD (chronic obstructive pulmonary disease) (HCC)     Cyst of nipple     Endometriosis     Fibromyalgia     Goiter     Goiter     Hyperlipidemia     Hypertension     Hypothyroidism     Osteoarthritis     Thyroid ca ([address]     TMJ (dislocation of temporomandibular joint)     TMJ arthritis        Past Surgical History:   Procedure Laterality Date    COLONOSCOPY      DIAGNOSTIC CARDIAC CATH LAB PROCEDURE  04/18/2019    THYROIDECTOMY      THYROIDECTOMY Left 1/6/2015       Family History   Problem Relation Age of Onset    Heart Disease Mother 61        CABG    Cancer Mother [de-identified]        lung    High Blood Pressure Mother     Heart Disease Father 48    High Blood Pressure Maternal Grandmother     High Blood Pressure Paternal Grandmother     Heart Attack Neg Hx     Hypertension Neg Hx     Diabetes Neg Hx        Social History     Socioeconomic History    Marital status:      Spouse name: None    Number of children: None    Years of education: None    Highest education level: None   Occupational History    None   Tobacco Use    Smoking status: Current Some Day Smoker     Packs/day: 1.00     Years: 45.00     Pack years: 45.00     Types: Cigarettes     Start date: 1966    Smokeless tobacco: Never Used    Tobacco comment: 9/23/19 now smoking 1 cig twice per day, had quit for 7 years at one point   Substance and Sexual Activity    Alcohol use: Yes    Drug use: No    Sexual activity: Yes     Partners: Male   Other Topics Concern    None   Social History Narrative    None     Social Determinants of Health     Financial Resource Strain: Low Risk     Difficulty of Paying Living Expenses: Not hard at all   Food Insecurity: No Food Insecurity    Worried About Running Out of Food in the Last Year: Never true    Patsy of Food in the Last Year: Never true   Transportation Needs: No Transportation Needs    Lack of Transportation (Medical): No    Lack of Transportation (Non-Medical):  No   Physical Activity:     Days of Exercise per Week:     Minutes of Exercise per Session:    Stress:     Feeling of Stress :    Social Connections:     Frequency of Communication with Friends and Family:     Frequency of Social Gatherings with Friends and Family:     Attends Confucianism Services:     Active Member of Clubs or Organizations:     Attends Club or Organization Meetings:     Marital Status:    Intimate Partner Violence:     Fear of Current or Ex-Partner:     Emotionally motion and neck supple. Neurological: She is alert and oriented to person, place, and time. Skin: Skin is warm. No cyanosis. Nails show no clubbing.        LABS:  CBC:   Lab Results   Component Value Date    WBC 4.6 12/24/2020    RBC 4.65 12/24/2020    HGB 14.4 12/24/2020    HCT 43.0 12/24/2020    MCV 92.4 12/24/2020    MCH 30.9 12/24/2020    MCHC 33.5 12/24/2020    RDW 14.0 12/24/2020     12/24/2020    MPV 9.6 10/09/2014     Lipids:  Lab Results   Component Value Date    CHOL 138 11/13/2019    CHOL 219 (H) 05/14/2019    CHOL 256 (H) 02/05/2019     Lab Results   Component Value Date    TRIG 160 (H) 11/13/2019    TRIG 104 05/14/2019    TRIG 116 02/05/2019     Lab Results   Component Value Date    HDL 76 (H) 12/24/2020    HDL 73 (H) 11/13/2019    HDL 76 (H) 05/14/2019     Lab Results   Component Value Date    LDLCALC 94 12/24/2020    LDLCALC 33 11/13/2019    LDLCALC 122 05/14/2019     No results found for: LABVLDL, VLDL  No results found for: CHOLHDLRATIO  CMP:    Lab Results   Component Value Date     12/24/2020    K 4.4 12/24/2020     12/24/2020    CO2 27 12/24/2020    BUN 13 12/24/2020    CREATININE 0.67 12/24/2020    GFRAA >60.0 12/24/2020    LABGLOM >60.0 12/24/2020    GLUCOSE 85 12/24/2020    PROT 6.8 12/24/2020    LABALBU 4.2 12/24/2020    CALCIUM 9.3 12/24/2020    BILITOT 0.3 12/24/2020    ALKPHOS 109 12/24/2020    AST 21 12/24/2020    ALT 17 12/24/2020     BMP:    Lab Results   Component Value Date     12/24/2020    K 4.4 12/24/2020     12/24/2020    CO2 27 12/24/2020    BUN 13 12/24/2020    LABALBU 4.2 12/24/2020    CREATININE 0.67 12/24/2020    CALCIUM 9.3 12/24/2020    GFRAA >60.0 12/24/2020    LABGLOM >60.0 12/24/2020    GLUCOSE 85 12/24/2020     Magnesium:    Lab Results   Component Value Date    MG 2.1 12/24/2020     TSH:  Lab Results   Component Value Date    TSH 2.760 12/24/2020       Patient Active Problem List   Diagnosis    Goiter    [facility]    GERD (gastroesophageal reflux disease)    Mixed hyperlipidemia    Hypothyroidism    Generalized anxiety disorder    Tobacco abuse    Intra-abdominal hematoma    Chest pain    Bilateral carotid bruits    Essential hypertension    Atrial fibrillation (HCC)    Coronary artery disease involving native coronary artery of native heart without angina pectoris    Hyperlipidemia    Bilateral carotid artery stenosis    Simple chronic bronchitis (HCC)    Angina effort    Normal cardiac stress test    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    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    Smoker       Medications Discontinued During This Encounter   Medication Reason    pitavastatin (LIVALO) 1 MG TABS tablet REORDER       Modified Medications    Modified Medication Previous Medication    PITAVASTATIN (LIVALO) 1 MG TABS TABLET pitavastatin (LIVALO) 1 MG TABS tablet       Take 0.5 tablets by mouth nightly    Take 0.5 tablets by mouth nightly       Orders Placed This Encounter   Medications    pitavastatin (LIVALO) 1 MG TABS tablet     Sig: Take 0.5 tablets by mouth nightly     Dispense:  45 tablet     Refill:  3     PA Case: [id number], Status: Approved, Coverage Starts on: 12/30/2019, Coverage Ends on: 12/31/2020. Questions? Contact [phone]. Assessment/Plan:    1. Essential hypertension  Stable - continue meds. Low salt diet      2. Coronary artery disease involving native coronary artery of native heart without angina pectoris  No angina - continue meds    3. Carotid - mild - CUS. Will continue surveillance    4. R Lung nodule- repeat CT chesst scheduled for 6 mo. If worse at tthat time [facility] eval - stable in f/u. - now seeing [facility] Doctor.      5.Claudication Cold feet - PVR - stable      Counseling:  Heart Healthy Lifestyle, Stop Smoking, Low Salt Diet, Take Precautions to Prevent Falls, Regular Exercise and

## 2021-09-17 ENCOUNTER — OFFICE VISIT (OUTPATIENT)
Dept: FAMILY MEDICINE CLINIC | Age: 71
End: 2021-09-17
Payer: MEDICARE

## 2021-09-17 VITALS
HEIGHT: 65 IN | WEIGHT: 117 LBS | BODY MASS INDEX: 19.49 KG/M2 | OXYGEN SATURATION: 99 % | DIASTOLIC BLOOD PRESSURE: 88 MMHG | HEART RATE: 64 BPM | TEMPERATURE: 98.4 F | SYSTOLIC BLOOD PRESSURE: 130 MMHG | RESPIRATION RATE: 16 BRPM

## 2021-09-17 DIAGNOSIS — J44.1 COPD WITH ACUTE EXACERBATION (HCC): Primary | ICD-10-CM

## 2021-09-17 DIAGNOSIS — R35.0 URINARY FREQUENCY: ICD-10-CM

## 2021-09-17 PROCEDURE — 99213 OFFICE O/P EST LOW 20 MIN: CPT | Performed by: PHYSICIAN ASSISTANT

## 2021-09-17 RX ORDER — BENZONATATE 200 MG/1
200 CAPSULE ORAL 3 TIMES DAILY PRN
Qty: 30 CAPSULE | Refills: 0 | Status: SHIPPED | OUTPATIENT
Start: 2021-09-17 | End: 2021-09-27 | Stop reason: SDUPTHER

## 2021-09-17 RX ORDER — CETIRIZINE HYDROCHLORIDE 10 MG/1
10 TABLET ORAL NIGHTLY PRN
Qty: 30 TABLET | Refills: 5 | Status: SHIPPED | OUTPATIENT
Start: 2021-09-17 | End: 2022-02-15

## 2021-09-17 ASSESSMENT — ENCOUNTER SYMPTOMS
NAUSEA: 0
CHEST TIGHTNESS: 0
SHORTNESS OF BREATH: 1
TROUBLE SWALLOWING: 0
COUGH: 1
APNEA: 0
CHOKING: 0
FACIAL SWELLING: 1
SINUS PRESSURE: 1
COLOR CHANGE: 0
DIARRHEA: 0
SINUS PAIN: 1
WHEEZING: 1
STRIDOR: 0
CONSTIPATION: 0
RHINORRHEA: 1
SORE THROAT: 0
EYE DISCHARGE: 0
PHOTOPHOBIA: 0
EYE PAIN: 0
ABDOMINAL PAIN: 0
EYE REDNESS: 0

## 2021-09-17 NOTE — PROGRESS NOTES
Subjective  Hany Jaciel Cutlip, 79 y.o. female presents today with:  Chief Complaint   Patient presents with    Sinusitis     Pt. is here for a f/u on Sinusitis. Pt. was seen at the walk in clinic on 09/11/2021 and was prescribed Vibra Tabs, Mucinex and Prednisone. Pt. c/o sinus pressure, cough, nasal congestion, headache, swollen glands and congestion. HPI  Patient is here for urgent care follow-up for cough productive of thick mucus sinus congestion wheezing fatigue for the past week patient has history of COPD -she states she believes she has seasonal allergies however her allergy screen done through [facility] was negative  She denies chest pain palpitation had cardiology follow-up yesterday  She does complain of urinary  frequency and occasional incontinence worsened by her cough  Review of Systems   Constitutional: Negative for activity change, appetite change, chills, diaphoresis and fever. HENT: Positive for congestion, facial swelling (swollen glands in neck.), postnasal drip, rhinorrhea, sinus pressure and sinus pain. Negative for ear discharge, ear pain, hearing loss, mouth sores, sore throat and trouble swallowing. Eyes: Negative for photophobia, pain, discharge and redness. Respiratory: Positive for cough, shortness of breath and wheezing. Negative for apnea, choking, chest tightness and stridor. Cardiovascular: Negative for chest pain, palpitations and leg swelling. Gastrointestinal: Negative for abdominal pain, constipation, diarrhea and nausea. Endocrine: Negative for cold intolerance, heat intolerance, polydipsia and polyphagia. Genitourinary: Negative for dysuria and frequency. Musculoskeletal: Negative for gait problem, joint swelling, neck pain and neck stiffness. Skin: Negative for color change, pallor, rash and wound. Allergic/Immunologic: Negative for immunocompromised state.    Neurological: Negative for tremors, syncope, facial asymmetry, speech difficulty and headaches. Psychiatric/Behavioral: Positive for sleep disturbance. Negative for confusion and hallucinations. The patient is not nervous/anxious and is not hyperactive. Past Medical History:   Diagnosis Date    [facility]     Anxiety     Asthma     Cancer ([facility] 2018    squamous cell- eye brow - CC    Chronic constipation     Colon polyps     COPD (chronic obstructive pulmonary disease) (HCC)     Cyst of nipple     Endometriosis     Fibromyalgia     Goiter     Goiter     Hyperlipidemia     Hypertension     Hypothyroidism     Osteoarthritis     [facility]roid ca [facility]     TMJ (dislocation of temporomandibular joint)     TMJ arthritis      Past Surgical History:   Procedure Laterality Date    COLONOSCOPY      DIAGNOSTIC CARDIAC CATH LAB PROCEDURE  04/18/2019    THYROIDECTOMY      THYROIDECTOMY Left 1/6/2015     Social History     Socioeconomic History    Marital status:      Spouse name: Not on file    Number of children: Not on file    Years of education: Not on file    Highest education level: Not on file   Occupational History    Not on file   Tobacco Use    Smoking status: Current Some Day Smoker     Packs/day: 1.00     Years: 45.00     Pack years: 45.00     Types: Cigarettes     Start date: 1966    Smokeless tobacco: Never Used    Tobacco comment: 9/23/19 now smoking 1 cig twice per day, had quit for 7 years at one point   Substance and Sexual Activity    Alcohol use:  Yes    Drug use: No    Sexual activity: Yes     Partners: Male   Other Topics Concern    Not on file   Social History Narrative    Not on file     Social Determinants of Health     Financial Resource Strain: Low Risk     Difficulty of Paying Living Expenses: Not hard at all   Food Insecurity: No Food Insecurity    Worried About Running Out of Food in the Last Year: Never true    Patsy of Food in the Last Year: Never true   Transportation Needs: No Transportation Needs    Lack of Transportation (Medical): No    Lack of Transportation (Non-Medical): No   Physical Activity:     Days of Exercise per Week:     Minutes of Exercise per Session:    Stress:     Feeling of Stress :    Social Connections:     Frequency of Communication with Friends and Family:     Frequency of Social Gatherings with Friends and Family:     Attends Mu-ism Services:     Active Member of Clubs or Organizations:     Attends Club or Organization Meetings:     Marital Status:    Intimate Partner Violence:     Fear of Current or Ex-Partner:     Emotionally Abused:     Physically Abused:     Sexually Abused:      Family History   Problem Relation Age of Onset    Heart Disease Mother 61        CABG    Cancer Mother [de-identified]        lung    High Blood Pressure Mother     Heart Disease Father 48    High Blood Pressure Maternal Grandmother     High Blood Pressure Paternal Grandmother     Heart Attack Neg Hx     Hypertension Neg Hx     Diabetes Neg Hx         Allergies   Allergen Reactions    Morphine      Tremor    Amoxil [Amoxicillin]      diarrhea    Cefdinir Itching and Other (See Comments)    Fosamax [Alendronate] Other (See Comments)     Muscle aches      Pantoprazole Other (See Comments)     abdominal cramping, uncontrolled BM.  Simvastatin      muscle soreness.     Zetia [Ezetimibe]     Zocor [Simvastatin - High Dose]     Adhesive Tape Rash     Current Outpatient Medications   Medication Sig Dispense Refill    benzonatate (TESSALON) 200 MG capsule Take 1 capsule by mouth 3 times daily as needed for Cough 30 capsule 0    cetirizine (ZYRTEC) 10 MG tablet Take 1 tablet by mouth nightly as needed for Allergies or Rhinitis 30 tablet 5    pitavastatin (LIVALO) 1 MG TABS tablet Take 0.5 tablets by mouth nightly 45 tablet 3    doxycycline hyclate (VIBRA-TABS) 100 MG tablet Take 1 tablet by mouth 2 times daily for 7 days 14 tablet 0    predniSONE (DELTASONE) 20 MG tablet Take 2 tablets by mouth daily for 3 days, THEN 1 tablet daily for 3 days, THEN 0.5 tablets daily for 3 days. 11 tablet 0    guaiFENesin (MUCINEX) 600 MG extended release tablet Take 1 tablet by mouth 2 times daily for 15 days 30 tablet 0    citalopram (CELEXA) 40 MG tablet Take 1 tablet by mouth once daily 90 tablet 1    meclofenamate (MECLOMEN) 100 MG capsule TAKE 1 CAPSULE BY MOUTH DAILY AS NEEDED 90 capsule 0    albuterol (ACCUNEB) 0.63 MG/3ML nebulizer solution Take 1 ampule by nebulization every 6 hours as needed for Wheezing      budesonide-formoterol (SYMBICORT) 160-4.5 MCG/ACT AERO Inhale 2 puffs into the lungs 2 times daily 1 Inhaler 3    dilTIAZem (CARDIZEM) 60 MG tablet Take 1 tablet by mouth 2 times daily 180 tablet 3    montelukast (SINGULAIR) 10 MG tablet Take 10 mg by mouth nightly      fexofenadine (ALLEGRA) 180 MG tablet Take 180 mg by mouth daily      fluticasone (FLONASE) 50 MCG/ACT nasal spray USE 1 SPRAY INTO EACH NOSTRIL ONCE DAILY 1 Bottle 2    aspirin 81 MG tablet Take 1 tablet by mouth daily With Food 30 tablet 3    albuterol sulfate  (90 Base) MCG/ACT inhaler 2 puffs every 6 hours 1 Inhaler 5    conjugated estrogens (PREMARIN) 0.625 MG/GM vaginal cream Place vaginally daily. 1 Tube 3    levothyroxine (SYNTHROID) 50 MCG tablet Take 50 mcg by mouth daily On Tuesday and Thursday; Saturday  she takes 2 tabs. On Monday,Wednesday,Friday she takes 2 1/2 tabs  3     No current facility-administered medications for this visit. Objective    Vitals:    09/17/21 0951   BP: 130/88   Site: Left Upper Arm   Position: Sitting   Cuff Size: Medium Adult   Pulse: 64   Resp: 16   Temp: 98.4 °F (36.9 °C)   TempSrc: Oral   SpO2: 99%   Weight: 117 lb (53.1 kg)   Height: 5' 5\" (1.651 m)     Physical Exam  Constitutional:       General: She is not in acute distress. Appearance: Normal appearance. She is not ill-appearing. HENT:      Head: Normocephalic and atraumatic. Left Ear: There is impacted cerumen.       Ears: Comments: Effusion right      Nose: Congestion present. Eyes:      Extraocular Movements: Extraocular movements intact. Conjunctiva/sclera: Conjunctivae normal.      Pupils: Pupils are equal, round, and reactive to light. Neck:      Thyroid: No thyromegaly. Cardiovascular:      Rate and Rhythm: Normal rate and regular rhythm. Heart sounds: Normal heart sounds. No murmur heard. Pulmonary:      Effort: No respiratory distress. Breath sounds: Wheezing and rhonchi present. Abdominal:      General: Bowel sounds are normal.      Palpations: Abdomen is soft. There is no mass. Tenderness: There is no abdominal tenderness. There is no guarding. Musculoskeletal:         General: Normal range of motion. Cervical back: Normal range of motion and neck supple. Lymphadenopathy:      Cervical: No cervical adenopathy. Skin:     General: Skin is warm and dry. Coloration: Skin is not jaundiced or pale. Neurological:      General: No focal deficit present. Mental Status: She is alert and oriented to person, place, and time. Cranial Nerves: No cranial nerve deficit. Motor: No weakness. Coordination: Coordination normal.      Gait: Gait normal.   Psychiatric:         Mood and Affect: Mood is anxious. Behavior: Behavior is cooperative. Thought Content: Thought content normal.         Judgment: Judgment normal.              Assessment & Plan    Diagnosis Orders   1. COPD with acute exacerbation (HCC)  XR CHEST STANDARD (2 VW)    benzonatate (TESSALON) 200 MG capsule    cetirizine (ZYRTEC) 10 MG tablet    Resume use of nebulizer as directed May increase Mucinex 600 mg to 2 tablets twice daily hold Allegra start Zyrtec continue Flonase and Singulair complete antib   2.  Urinary frequency  Urine Reflex to Culture         Orders Placed This Encounter   Procedures    XR CHEST STANDARD (2 VW)     Standing Status:   Future     Standing Expiration Date: 9/17/2022    Urine Reflex to Culture     Standing Status:   Future     Standing Expiration Date:   9/17/2022     Order Specific Question:   SPECIFY(EX-CATH,MIDSTREAM,CYSTO,ETC)? Answer:   mid stream     Orders Placed This Encounter   Medications    benzonatate (TESSALON) 200 MG capsule     Sig: Take 1 capsule by mouth 3 times daily as needed for Cough     Dispense:  30 capsule     Refill:  0    cetirizine (ZYRTEC) 10 MG tablet     Sig: Take 1 tablet by mouth nightly as needed for Allergies or Rhinitis     Dispense:  30 tablet     Refill:  5     There are no discontinued medications. Return if symptoms worsen or fail to improve, for call for results.     Mini Wiseman PA-C

## 2021-09-22 ENCOUNTER — TELEPHONE (OUTPATIENT)
Dept: FAMILY MEDICINE CLINIC | Age: 71
End: 2021-09-22

## 2021-09-22 NOTE — TELEPHONE ENCOUNTER
Patient wants a phone call with the chest xray and urine results. She would like results explained  asap,     She has also coughed so hard she pulled a muscle in her stomach. I made her an appointment for Monday she wants to know if that is the soonest or Does Mini Wiseman want her to be seen in walk in.  Please cyrus

## 2021-09-24 ASSESSMENT — ENCOUNTER SYMPTOMS
SINUS PAIN: 1
COUGH: 1
SINUS PRESSURE: 1
SORE THROAT: 1

## 2021-09-27 ENCOUNTER — OFFICE VISIT (OUTPATIENT)
Dept: FAMILY MEDICINE CLINIC | Age: 71
End: 2021-09-27
Payer: MEDICARE

## 2021-09-27 VITALS
OXYGEN SATURATION: 97 % | WEIGHT: 117 LBS | RESPIRATION RATE: 15 BRPM | SYSTOLIC BLOOD PRESSURE: 114 MMHG | DIASTOLIC BLOOD PRESSURE: 68 MMHG | HEART RATE: 71 BPM | TEMPERATURE: 97.6 F | BODY MASS INDEX: 19.49 KG/M2 | HEIGHT: 65 IN

## 2021-09-27 DIAGNOSIS — F17.200 NICOTINE USE DISORDER: Primary | ICD-10-CM

## 2021-09-27 DIAGNOSIS — J44.1 COPD WITH ACUTE EXACERBATION (HCC): ICD-10-CM

## 2021-09-27 PROCEDURE — 99213 OFFICE O/P EST LOW 20 MIN: CPT | Performed by: PHYSICIAN ASSISTANT

## 2021-09-27 RX ORDER — BENZONATATE 200 MG/1
200 CAPSULE ORAL 3 TIMES DAILY PRN
Qty: 30 CAPSULE | Refills: 1 | Status: SHIPPED | OUTPATIENT
Start: 2021-09-27 | End: 2021-10-04

## 2021-09-27 RX ORDER — GUAIFENESIN 600 MG/1
600 TABLET, EXTENDED RELEASE ORAL 2 TIMES DAILY
Qty: 60 TABLET | Refills: 5 | Status: SHIPPED | OUTPATIENT
Start: 2021-09-27 | End: 2022-04-05

## 2021-09-27 ASSESSMENT — ENCOUNTER SYMPTOMS
COUGH: 1
WHEEZING: 1
SHORTNESS OF BREATH: 1

## 2021-09-27 NOTE — PROGRESS NOTES
Subjective  Lanis Opitz Cutlip, 79 y.o. female presents today with:  Chief Complaint   Patient presents with    Cough     still has bad cough which has caused bruising to her stomach               No results found for: LABA1C  Lab Results   Component Value Date    CREATININE 0.67 12/24/2020     Lab Results   Component Value Date    ALT 17 12/24/2020    AST 21 12/24/2020     Lab Results   Component Value Date    CHOL 138 11/13/2019    TRIG 160 (H) 11/13/2019    HDL 76 (H) 12/24/2020    LDLCALC 94 12/24/2020          HPI    Her e for flu on copd exaccerbation - still smoking 2 ppd-  Cough has essentially resolved  Has bruising however lower abdomen pelvis and pain along her lower rib from excessive coughing  Review of Systems   Respiratory: Positive for cough, shortness of breath and wheezing. All other systems reviewed and are negative.         Past Medical History:   Diagnosis Date    [facility]     Anxiety     Asthma     Cancer ([facility] [address] [facility] 2018    squamous cell- eye brow - CC    Chronic constipation     Colon polyps     COPD (chronic obstructive pulmonary disease) (HCC)     Cyst of nipple     Endometriosis     Fibromyalgia     Goiter     Goiter     Hyperlipidemia     Hypertension     Hypothyroidism     Osteoarthritis     Thyroid ca ([facility] [address] [facility]     TMJ (dislocation of temporomandibular joint)     TMJ arthritis      Past Surgical History:   Procedure Laterality Date    COLONOSCOPY      DIAGNOSTIC CARDIAC CATH LAB PROCEDURE  04/18/2019    THYROIDECTOMY      THYROIDECTOMY Left 1/6/2015     Social History     Socioeconomic History    Marital status:      Spouse name: Not on file    Number of children: Not on file    Years of education: Not on file    Highest education level: Not on file   Occupational History    Not on file   Tobacco Use    Smoking status: Current Some Day Smoker     Packs/day: 1.00     Years: 45.00     Pack years: 45.00     Types: Cigarettes     Start date: 1966    Smokeless tobacco: Never Used    Tobacco comment: 9/23/19 now smoking 1 cig twice per day, had quit for 7 years at one point   Substance and Sexual Activity    Alcohol use: Yes    Drug use: No    Sexual activity: Yes     Partners: Male   Other Topics Concern    Not on file   Social History Narrative    Not on file     Social Determinants of Health     Financial Resource Strain: Low Risk     Difficulty of Paying Living Expenses: Not hard at all   Food Insecurity: No Food Insecurity    Worried About Running Out of Food in the Last Year: Never true    [address] in the Last Year: Never true   Transportation Needs: No Transportation Needs    Lack of Transportation (Medical): No    Lack of Transportation (Non-Medical): No   Physical Activity:     Days of Exercise per Week:     Minutes of Exercise per Session:    Stress:     Feeling of Stress :    Social Connections:     Frequency of Communication with Friends and Family:     Frequency of Social Gatherings with Friends and Family:     Attends Yarsanism Services:     Active Member of Clubs or Organizations:     Attends Club or Organization Meetings:     Marital Status:    Intimate Partner Violence:     Fear of Current or Ex-Partner:     Emotionally Abused:     Physically Abused:     Sexually Abused:      Family History   Problem Relation Age of Onset    Heart Disease Mother 61        CABG    Cancer Mother [de-identified]        lung    High Blood Pressure Mother     Heart Disease Father 48    High Blood Pressure Maternal Grandmother     High Blood Pressure Paternal Grandmother     Heart Attack Neg Hx     Hypertension Neg Hx     Diabetes Neg Hx      Allergies   Allergen Reactions    Morphine      Tremor    Amoxil [Amoxicillin]      diarrhea    Cefdinir Itching and Other (See Comments)    Fosamax [Alendronate] Other (See Comments)     Muscle aches      Pantoprazole Other (See Comments)     abdominal cramping, uncontrolled BM.      Simvastatin      muscle soreness.  Zetia [Ezetimibe]     Zocor [Simvastatin - High Dose]     Adhesive Tape Rash        Current Outpatient Medications   Medication Sig Dispense Refill    guaiFENesin (MUCINEX) 600 MG extended release tablet Take 1 tablet by mouth 2 times daily 60 tablet 5    benzonatate (TESSALON) 200 MG capsule Take 1 capsule by mouth 3 times daily as needed for Cough 30 capsule 1    cetirizine (ZYRTEC) 10 MG tablet Take 1 tablet by mouth nightly as needed for Allergies or Rhinitis 30 tablet 5    pitavastatin (LIVALO) 1 MG TABS tablet Take 0.5 tablets by mouth nightly 45 tablet 3    citalopram (CELEXA) 40 MG tablet Take 1 tablet by mouth once daily 90 tablet 1    meclofenamate (MECLOMEN) 100 MG capsule TAKE 1 CAPSULE BY MOUTH DAILY AS NEEDED 90 capsule 0    albuterol (ACCUNEB) 0.63 MG/3ML nebulizer solution Take 1 ampule by nebulization every 6 hours as needed for Wheezing      budesonide-formoterol (SYMBICORT) 160-4.5 MCG/ACT AERO Inhale 2 puffs into the lungs 2 times daily 1 Inhaler 3    dilTIAZem (CARDIZEM) 60 MG tablet Take 1 tablet by mouth 2 times daily 180 tablet 3    montelukast (SINGULAIR) 10 MG tablet Take 10 mg by mouth nightly      fexofenadine (ALLEGRA) 180 MG tablet Take 180 mg by mouth daily      fluticasone (FLONASE) 50 MCG/ACT nasal spray USE 1 SPRAY INTO EACH NOSTRIL ONCE DAILY 1 Bottle 2    aspirin 81 MG tablet Take 1 tablet by mouth daily With Food 30 tablet 3    albuterol sulfate  (90 Base) MCG/ACT inhaler 2 puffs every 6 hours 1 Inhaler 5    conjugated estrogens (PREMARIN) 0.625 MG/GM vaginal cream Place vaginally daily. 1 Tube 3    levothyroxine (SYNTHROID) 50 MCG tablet Take 50 mcg by mouth daily On Tuesday and Thursday; Saturday  she takes 2 tabs. On Monday,Wednesday,Friday she takes 2 1/2 tabs  3     No current facility-administered medications for this visit.        Objective    Vitals:    09/27/21 1000   BP: 114/68   Pulse: 71   Resp: 15   Temp: 97.6 °F (36.4 °C) SpO2: 97%   Weight: 117 lb (53.1 kg)   Height: 5' 5\" (1.651 m)     Physical Exam  Constitutional:       General: She is not in acute distress. Appearance: She is obese. She is not ill-appearing. HENT:      Head: Normocephalic and atraumatic. Eyes:      Extraocular Movements: Extraocular movements intact. Conjunctiva/sclera: Conjunctivae normal.      Pupils: Pupils are equal, round, and reactive to light. Neck:      Thyroid: No thyromegaly. Cardiovascular:      Rate and Rhythm: Normal rate and regular rhythm. Heart sounds: Normal heart sounds. No murmur heard. Pulmonary:      Effort: Pulmonary effort is normal. No respiratory distress. Breath sounds: Wheezing present. No rales. Abdominal:      General: Bowel sounds are normal.      Palpations: Abdomen is soft. There is no mass. Tenderness: There is no abdominal tenderness. There is no guarding. Musculoskeletal:         General: Normal range of motion. Cervical back: Normal range of motion and neck supple. Lymphadenopathy:      Cervical: No cervical adenopathy. Skin:     General: Skin is warm and dry. Coloration: Skin is not jaundiced. Neurological:      General: No focal deficit present. Mental Status: She is alert and oriented to person, place, and time. Cranial Nerves: No cranial nerve deficit. Motor: No weakness. Coordination: Coordination normal.      Gait: Gait normal.   Psychiatric:         Mood and Affect: Mood normal.         Behavior: Behavior normal.         Thought Content: Thought content normal.         Judgment: Judgment normal.              Assessment & Plan    Diagnosis Orders   1. Nicotine use disorder      Strongly encouraged to try to quit smoking   2.  COPD with acute exacerbation (HCC)  guaiFENesin (MUCINEX) 600 MG extended release tablet    benzonatate (TESSALON) 200 MG capsule    Resume use of nebulizer as directed May increase Mucinex 600 mg to 2 tablets twice daily hold Allegra start Zyrtec continue Flonase and Singulair complete antib         No orders of the defined types were placed in this encounter. Orders Placed This Encounter   Medications    guaiFENesin (MUCINEX) 600 MG extended release tablet     Sig: Take 1 tablet by mouth 2 times daily     Dispense:  60 tablet     Refill:  5    benzonatate (TESSALON) 200 MG capsule     Sig: Take 1 capsule by mouth 3 times daily as needed for Cough     Dispense:  30 capsule     Refill:  1     Medications Discontinued During This Encounter   Medication Reason    guaiFENesin (MUCINEX) 600 MG extended release tablet REORDER    benzonatate (TESSALON) 200 MG capsule REORDER     Return if symptoms worsen or fail to improve.     Mini Wiseman PA-C

## 2021-09-29 DIAGNOSIS — F41.1 GENERALIZED ANXIETY DISORDER: ICD-10-CM

## 2021-09-29 DIAGNOSIS — G47.00 INSOMNIA, UNSPECIFIED TYPE: ICD-10-CM

## 2021-09-29 RX ORDER — ALPRAZOLAM 0.5 MG/1
0.5 TABLET ORAL NIGHTLY PRN
Qty: 30 TABLET | Refills: 1 | Status: SHIPPED | OUTPATIENT
Start: 2021-09-29 | End: 2021-11-29 | Stop reason: SDUPTHER

## 2021-11-24 ENCOUNTER — OFFICE VISIT (OUTPATIENT)
Dept: FAMILY MEDICINE CLINIC | Age: 71
End: 2021-11-24
Payer: MEDICARE

## 2021-11-24 VITALS
HEART RATE: 66 BPM | SYSTOLIC BLOOD PRESSURE: 120 MMHG | WEIGHT: 127 LBS | DIASTOLIC BLOOD PRESSURE: 64 MMHG | RESPIRATION RATE: 15 BRPM | BODY MASS INDEX: 18.18 KG/M2 | HEIGHT: 70 IN

## 2021-11-24 DIAGNOSIS — S90.422A BLISTER (NONTHERMAL), LEFT GREAT TOE, INITIAL ENCOUNTER: Primary | ICD-10-CM

## 2021-11-24 PROCEDURE — 99213 OFFICE O/P EST LOW 20 MIN: CPT | Performed by: PHYSICIAN ASSISTANT

## 2021-11-24 RX ORDER — DOXYCYCLINE HYCLATE 100 MG
100 TABLET ORAL 2 TIMES DAILY
Qty: 20 TABLET | Refills: 0 | Status: SHIPPED | OUTPATIENT
Start: 2021-11-24 | End: 2021-12-04

## 2021-11-24 RX ORDER — SULFAMETHOXAZOLE AND TRIMETHOPRIM 800; 160 MG/1; MG/1
1 TABLET ORAL 2 TIMES DAILY
Qty: 20 TABLET | Refills: 0 | Status: SHIPPED | OUTPATIENT
Start: 2021-11-24 | End: 2021-12-04

## 2021-11-24 ASSESSMENT — ENCOUNTER SYMPTOMS
SHORTNESS OF BREATH: 0
SINUS PRESSURE: 0
CHEST TIGHTNESS: 0
BACK PAIN: 0
COUGH: 0
TROUBLE SWALLOWING: 0
ABDOMINAL PAIN: 0
VOMITING: 0
DIARRHEA: 0

## 2021-11-24 ASSESSMENT — PATIENT HEALTH QUESTIONNAIRE - PHQ9
SUM OF ALL RESPONSES TO PHQ9 QUESTIONS 1 & 2: 0
SUM OF ALL RESPONSES TO PHQ QUESTIONS 1-9: 0
2. FEELING DOWN, DEPRESSED OR HOPELESS: 0
SUM OF ALL RESPONSES TO PHQ QUESTIONS 1-9: 0
1. LITTLE INTEREST OR PLEASURE IN DOING THINGS: 0
SUM OF ALL RESPONSES TO PHQ QUESTIONS 1-9: 0

## 2021-11-24 NOTE — PROGRESS NOTES
Subjective:      Patient ID: Dalila Galloway is a 79 y.o. female who presents today for:  Chief Complaint   Patient presents with    Toe Pain     left foot big toe       HPI  79year old female who presents with an infection female who presents with an infection of the dorsum of her left great toe. She did have a surgical flap preformed on this toe 20 years ago. The area is erythematous and edematous. With minimal pain. No drainage. Past Medical History:   Diagnosis Date    [facility] [address]     Anxiety     Asthma     Cancer ([facility] [address] 2018    squamous cell- eye brow - CC    Chronic constipation     Colon polyps     COPD (chronic obstructive pulmonary disease) (HCC)     Cyst of nipple     Endometriosis     Fibromyalgia     Goiter     Goiter     Hyperlipidemia     Hypertension     Hypothyroidism     Osteoarthritis     Thyroid ca ([facility] [address]     TMJ (dislocation of temporomandibular joint)     TMJ arthritis      Past Surgical History:   Procedure Laterality Date    COLONOSCOPY      DIAGNOSTIC CARDIAC CATH LAB PROCEDURE  04/18/2019    THYROIDECTOMY      THYROIDECTOMY Left 1/6/2015     Social History     Socioeconomic History    Marital status:      Spouse name: Not on file    Number of children: Not on file    Years of education: Not on file    Highest education level: Not on file   Occupational History    Not on file   Tobacco Use    Smoking status: Current Some Day Smoker     Packs/day: 1.00     Years: 45.00     Pack years: 45.00     Types: Cigarettes     Start date: 1966    Smokeless tobacco: Never Used    Tobacco comment: 9/23/19 now smoking 1 cig twice per day, had quit for 7 years at one point   Substance and Sexual Activity    Alcohol use:  Yes    Drug use: No    Sexual activity: Yes     Partners: Male   Other Topics Concern    Not on file   Social History Narrative    Not on file     Social Determinants of Health     Financial Resource Strain: Low Risk     Difficulty of Paying Living Expenses: Not hard at all   Food Insecurity: No Food Insecurity    Worried About [address] [facility] in the Last Year: Never true    Ran Out of Food in the Last Year: Never true   Transportation Needs: No Transportation Needs    Lack of Transportation (Medical): No    Lack of Transportation (Non-Medical): No   Physical Activity:     Days of Exercise per Week: Not on file    Minutes of Exercise per Session: Not on file   Stress:     Feeling of Stress : Not on file   Social Connections:     Frequency of Communication with Friends and Family: Not on file    Frequency of Social Gatherings with Friends and Family: Not on file    Attends [employer] Services: Not on file    Active Member of [address] [place] [address] or Organizations: Not on file    Attends Club or Organization Meetings: Not on file    Marital Status: Not on file   Intimate Partner Violence:     Fear of Current or Ex-Partner: Not on file    Emotionally Abused: Not on file    Physically Abused: Not on file    Sexually Abused: Not on file   Housing Stability:     Unable to Pay for Housing in the Last Year: Not on file    Number of [place] in the Last Year: Not on file    Unstable Housing in the Last Year: Not on file     Family History   Problem Relation Age of Onset    Heart Disease Mother 61        CABG    Cancer Mother [de-identified]        lung    High Blood Pressure Mother     Heart Disease Father 48    High Blood Pressure Maternal Grandmother     High Blood Pressure Paternal Grandmother     Heart Attack Neg Hx     Hypertension Neg Hx     Diabetes Neg Hx      Allergies   Allergen Reactions    Morphine      Tremor    Amoxil [Amoxicillin]      diarrhea    Cefdinir Itching and Other (See Comments)    Fosamax [Alendronate] Other (See Comments)     Muscle aches      Pantoprazole Other (See Comments)     abdominal cramping, uncontrolled BM.  Simvastatin      muscle soreness.     Zetia [Ezetimibe]     Zocor [Simvastatin - High Dose]     Adhesive Tape Rash     Current Outpatient Medications   Medication Sig Dispense Refill    doxycycline hyclate (VIBRA-TABS) 100 MG tablet Take 1 tablet by mouth 2 times daily for 10 days 20 tablet 0    sulfamethoxazole-trimethoprim (BACTRIM DS) 800-160 MG per tablet Take 1 tablet by mouth 2 times daily for 10 days 20 tablet 0    cetirizine (ZYRTEC) 10 MG tablet Take 1 tablet by mouth nightly as needed for Allergies or Rhinitis 30 tablet 5    pitavastatin (LIVALO) 1 MG TABS tablet Take 0.5 tablets by mouth nightly 45 tablet 3    citalopram (CELEXA) 40 MG tablet Take 1 tablet by mouth once daily 90 tablet 1    meclofenamate (MECLOMEN) 100 MG capsule TAKE 1 CAPSULE BY MOUTH DAILY AS NEEDED 90 capsule 0    albuterol (ACCUNEB) 0.63 MG/3ML nebulizer solution Take 1 ampule by nebulization every 6 hours as needed for Wheezing      budesonide-formoterol (SYMBICORT) 160-4.5 MCG/ACT AERO Inhale 2 puffs into the lungs 2 times daily 1 Inhaler 3    dilTIAZem (CARDIZEM) 60 MG tablet Take 1 tablet by mouth 2 times daily 180 tablet 3    montelukast (SINGULAIR) 10 MG tablet Take 10 mg by mouth nightly      fexofenadine (ALLEGRA) 180 MG tablet Take 180 mg by mouth daily      fluticasone (FLONASE) 50 MCG/ACT nasal spray USE 1 SPRAY INTO EACH NOSTRIL ONCE DAILY 1 Bottle 2    aspirin 81 MG tablet Take 1 tablet by mouth daily With Food 30 tablet 3    albuterol sulfate  (90 Base) MCG/ACT inhaler 2 puffs every 6 hours 1 Inhaler 5    conjugated estrogens (PREMARIN) 0.625 MG/GM vaginal cream Place vaginally daily. 1 Tube 3    levothyroxine (SYNTHROID) 50 MCG tablet Take 50 mcg by mouth daily On Tuesday and Thursday; Saturday  she takes 2 tabs. On Monday,Wednesday,Friday she takes 2 1/2 tabs  3     No current facility-administered medications for this visit. Review of Systems   Constitutional: Negative for activity change, appetite change, chills, fever and unexpected weight change.    HENT: Negative for drooling, ear pain, nosebleeds, sinus pressure and trouble swallowing. Respiratory: Negative for cough, chest tightness and shortness of breath. Cardiovascular: Negative for chest pain and leg swelling. Gastrointestinal: Negative for abdominal pain, diarrhea and vomiting. Endocrine: Negative for polydipsia and polyphagia. Genitourinary: Negative for dysuria, flank pain and frequency. Musculoskeletal: Positive for arthralgias (left great toe wound). Negative for back pain and myalgias. Skin: Negative for pallor and rash. Neurological: Negative for syncope, weakness and headaches. Hematological: Does not bruise/bleed easily. Psychiatric/Behavioral: Negative for agitation, behavioral problems and confusion. All other systems reviewed and are negative. Objective:   /64   Pulse 66   Resp 15   Ht 5' 10\" (1.778 m)   Wt 127 lb (57.6 kg)   LMP 01/09/1995   BMI 18.22 kg/m²     Physical Exam  Vitals and nursing note reviewed. Constitutional:       General: She is awake. She is not in acute distress. Appearance: Normal appearance. She is well-developed and normal weight. She is not ill-appearing, toxic-appearing or diaphoretic. Comments: No photophobia. No phonophobia. HENT:      Head: Normocephalic and atraumatic. No Clark's sign. Right Ear: External ear normal.      Left Ear: External ear normal.      Nose: Nose normal. No congestion or rhinorrhea. Mouth/Throat:      Mouth: Mucous membranes are moist.      Pharynx: Oropharynx is clear. No oropharyngeal exudate or posterior oropharyngeal erythema. Eyes:      General: No scleral icterus. Right eye: No foreign body or discharge. Left eye: No discharge. Extraocular Movements: Extraocular movements intact. Conjunctiva/sclera: Conjunctivae normal.      Left eye: No exudate. Pupils: Pupils are equal, round, and reactive to light. Neck:      Vascular: No JVD.       Trachea: No tracheal deviation. Comments: No meningismus. Cardiovascular:      Rate and Rhythm: Normal rate and regular rhythm. Heart sounds: Normal heart sounds. Heart sounds not distant. No murmur heard. No friction rub. No gallop. Pulmonary:      Effort: Pulmonary effort is normal. No respiratory distress. Breath sounds: Normal breath sounds. No stridor. No wheezing, rhonchi or rales. Chest:      Chest wall: No tenderness. Abdominal:      General: Abdomen is flat. Bowel sounds are normal. There is no distension. Palpations: Abdomen is soft. There is no mass. Tenderness: There is no abdominal tenderness. There is no right CVA tenderness, left CVA tenderness, guarding or rebound. Hernia: No hernia is present. Musculoskeletal:         General: No swelling, tenderness, deformity or signs of injury. Cervical back: Normal range of motion and neck supple. No rigidity. Left foot: Decreased range of motion. Feet:    Feet:      Left foot:      Skin integrity: Ulcer and blister present. Lymphadenopathy:      Head:      Right side of head: No submental adenopathy. Left side of head: No submental adenopathy. Skin:     General: Skin is warm and dry. Capillary Refill: Capillary refill takes less than 2 seconds. Coloration: Skin is not jaundiced or pale. Findings: No bruising, erythema, lesion or rash. Neurological:      General: No focal deficit present. Mental Status: She is alert and oriented to person, place, and time. Mental status is at baseline. Cranial Nerves: No cranial nerve deficit. Sensory: No sensory deficit. Motor: No weakness. Coordination: Coordination normal.      Deep Tendon Reflexes: Reflexes are normal and symmetric. Psychiatric:         Mood and Affect: Mood normal.         Behavior: Behavior normal. Behavior is cooperative. Thought Content:  Thought content normal.         Judgment: Judgment normal. Assessment:       Diagnosis Orders   1. Blister (nonthermal), left great toe, initial encounter  doxycycline hyclate (VIBRA-TABS) 100 MG tablet    sulfamethoxazole-trimethoprim (BACTRIM DS) 800-160 MG per tablet    JEFF Law DPM, Podiatry, [place]     No results found for this visit on 11/24/21. Plan:     Assessment & Plan   Ana Tan was seen today for toe pain. Diagnoses and all orders for this visit:    Blister (nonthermal), left great toe, initial encounter  -     doxycycline hyclate (VIBRA-TABS) 100 MG tablet; Take 1 tablet by mouth 2 times daily for 10 days  -     sulfamethoxazole-trimethoprim (BACTRIM DS) 800-160 MG per tablet; Take 1 tablet by mouth 2 times daily for 10 days  -     JEFF Law DPM, Podiatry, [place]      Orders Placed This Encounter   Procedures    JEFF Law DPM, Podiatry, [place]     Referral Priority:   Routine     Referral Type:   Eval and Treat     Referral Reason:   Specialty Services Required     Referred to Provider:   Ирина Swartz DPM     Requested Specialty:   Podiatry     Number of Visits Requested:   1     Orders Placed This Encounter   Medications    doxycycline hyclate (VIBRA-TABS) 100 MG tablet     Sig: Take 1 tablet by mouth 2 times daily for 10 days     Dispense:  20 tablet     Refill:  0    sulfamethoxazole-trimethoprim (BACTRIM DS) 800-160 MG per tablet     Sig: Take 1 tablet by mouth 2 times daily for 10 days     Dispense:  20 tablet     Refill:  0     There are no discontinued medications. No follow-ups on file. Reviewed with the patient/family: current clinical status & medications. Side effects of the medication prescribed today, as well as treatment plan/rationale and result expectations have been discussed with the patient/family who expresses understanding. Patient will be discharged home in stable condition.     Follow up with PCP to evaluate treatment results or return if symptoms worsen or fail to improve. Discussed signs and symptoms which require immediate follow-up in ED/call to 911. Understanding verbalized. I have reviewed the patient's medical history in detail and updated the computerized patient record.     GADIEL Judge

## 2021-11-29 DIAGNOSIS — G47.00 INSOMNIA, UNSPECIFIED TYPE: ICD-10-CM

## 2021-11-29 DIAGNOSIS — F41.1 GENERALIZED ANXIETY DISORDER: ICD-10-CM

## 2021-11-29 RX ORDER — BUDESONIDE AND FORMOTEROL FUMARATE DIHYDRATE 160; 4.5 UG/1; UG/1
2 AEROSOL RESPIRATORY (INHALATION) 2 TIMES DAILY
Qty: 1 EACH | Refills: 2 | Status: SHIPPED | OUTPATIENT
Start: 2021-11-29 | End: 2022-05-23

## 2021-11-29 RX ORDER — ALPRAZOLAM 0.5 MG/1
0.5 TABLET ORAL NIGHTLY PRN
Qty: 30 TABLET | Refills: 1 | Status: SHIPPED | OUTPATIENT
Start: 2021-11-29 | End: 2021-12-29

## 2021-11-29 NOTE — TELEPHONE ENCOUNTER
Patient requesting medication refill. Please approve or deny this request.      Rx requested:  Requested Prescriptions     Pending Prescriptions Disp Refills    budesonide-formoterol (SYMBICORT) 160-4.5 MCG/ACT AERO       Sig: Inhale 2 puffs into the lungs 2 times daily    conjugated estrogens (PREMARIN) 0.625 MG/GM vaginal cream       Sig: Place vaginally daily.  ALPRAZolam (XANAX) 0.5 MG tablet 30 tablet 1     Sig: Take 1 tablet by mouth nightly as needed for Sleep for up to 30 days. Patient asked if she can have additional refills for the  ALPRAZOLAM [facility], so she doesn't have to call in every month?         Last Office Visit:   9/27/2021      Next Visit Date:  Future Appointments   Date Time Provider Lashonda Villagran   12/2/2021  4:10 PM BAM HOOD, COVID-19 VACCINE SCHEDULE [facility]   1/24/2022 12:00 PM Aaron Bishop MD [facility]

## 2021-12-01 ENCOUNTER — HOSPITAL ENCOUNTER (OUTPATIENT)
Dept: GENERAL RADIOLOGY | Age: 71
Discharge: HOME | End: 2021-12-03
Payer: MEDICARE

## 2021-12-01 DIAGNOSIS — L97.529 ULCER OF LEFT FOOT, UNSPECIFIED ULCER STAGE (HCC): ICD-10-CM

## 2021-12-01 PROCEDURE — 73630 X-RAY EXAM OF FOOT: CPT

## 2021-12-02 ENCOUNTER — IMMUNIZATION (OUTPATIENT)
Dept: FAMILY MEDICINE CLINIC | Age: 71
End: 2021-12-02
Payer: MEDICARE

## 2021-12-02 DIAGNOSIS — Z23 NEED FOR COVID-19 VACCINE: Primary | ICD-10-CM

## 2021-12-02 PROCEDURE — 91306 COVID-19, MODERNA BOOSTER VACCINE 0.25ML DOSE: CPT | Performed by: FAMILY MEDICINE

## 2021-12-02 PROCEDURE — 0064A COVID-19, MODERNA BOOSTER VACCINE 0.25ML DOSE: CPT | Performed by: FAMILY MEDICINE

## 2021-12-04 LAB
GRAM STAIN RESULT: NORMAL
WOUND/ABSCESS: NORMAL

## 2022-01-24 ENCOUNTER — OFFICE VISIT (OUTPATIENT)
Dept: CARDIOLOGY CLINIC | Age: 72
End: 2022-01-24
Payer: MEDICARE

## 2022-01-24 VITALS
RESPIRATION RATE: 16 BRPM | OXYGEN SATURATION: 94 % | WEIGHT: 122.2 LBS | HEIGHT: 65 IN | HEART RATE: 71 BPM | SYSTOLIC BLOOD PRESSURE: 126 MMHG | BODY MASS INDEX: 20.36 KG/M2 | DIASTOLIC BLOOD PRESSURE: 78 MMHG

## 2022-01-24 DIAGNOSIS — I73.9 CLAUDICATION (HCC): ICD-10-CM

## 2022-01-24 DIAGNOSIS — I65.23 BILATERAL CAROTID ARTERY STENOSIS: ICD-10-CM

## 2022-01-24 DIAGNOSIS — I25.10 CORONARY ARTERY DISEASE INVOLVING NATIVE CORONARY ARTERY OF NATIVE HEART WITHOUT ANGINA PECTORIS: Primary | ICD-10-CM

## 2022-01-24 DIAGNOSIS — E78.5 HYPERLIPIDEMIA, UNSPECIFIED HYPERLIPIDEMIA TYPE: ICD-10-CM

## 2022-01-24 DIAGNOSIS — I10 ESSENTIAL HYPERTENSION: ICD-10-CM

## 2022-01-24 DIAGNOSIS — E78.2 MIXED HYPERLIPIDEMIA: ICD-10-CM

## 2022-01-24 PROCEDURE — 99214 OFFICE O/P EST MOD 30 MIN: CPT | Performed by: INTERNAL MEDICINE

## 2022-01-24 PROCEDURE — 93000 ELECTROCARDIOGRAM COMPLETE: CPT | Performed by: INTERNAL MEDICINE

## 2022-01-24 RX ORDER — ALPRAZOLAM 0.5 MG/1
0.5 TABLET ORAL DAILY
COMMUNITY
Start: 2022-01-04 | End: 2022-01-31 | Stop reason: SDUPTHER

## 2022-01-24 ASSESSMENT — ENCOUNTER SYMPTOMS
GASTROINTESTINAL NEGATIVE: 1
CHEST TIGHTNESS: 0
EYES NEGATIVE: 1
RESPIRATORY NEGATIVE: 1
STRIDOR: 0
SHORTNESS OF BREATH: 0
BLOOD IN STOOL: 0
NAUSEA: 0
WHEEZING: 0
COUGH: 0

## 2022-01-24 NOTE — PROGRESS NOTES
Subsequent Progress Note  Patient: Pierce Carpio  YOB: 1950  MRN: [id number]    Chief Complaint: carotid htn carotid cad  Chief Complaint   Patient presents with    Follow-up     4 month     Coronary Artery Disease    Hypertension       CV Data:  2/2017 spect negative ccf  2/2017 PVR negative ccf  1/2019 CUS extensive atherosclerosis R>L  1/19 echo ef 60   4/5/2019 CTA neck - CHERELLE 09%  LICA 34  9/81/8134 CATH LAD Mild RCA 20% EF 55 EDP 5   S/P Complete Thyroidectomy   12/2020 PVR negative  12/2020 CUS CHERELLE 50-69%     Subjective/HPI: no cp no sob no falls no bleed tolerating only 1/2 tab Livalo. Full tab caused severe myalgia. LDL not to goal.      1/13/2020 no cp no sob no falls no bleed f/u CT chest mild enlarged R nodule. 12/7/2020 still smokes. No cp no palp no falls no bleeed no sob    1/11/21 TELEHEALTH EVALUATION -- Audio/Visual (During [place] [id number] public health emergency)    No cp no sob no falls no bleed. Eats well still smokes    5/12/21 TELEHEALTH EVALUATION -- Audio/Visual (During [place] [address] public health emergency)    No cp no sob no falls no bleed take smeds doing well    9/16/21 now has bronchitis. No cp no sob no falls occ hemorrhoid bleed. Takes med    1/24/22 doing well no cp no sob no bleed. No falls takes meds.  PFT at [facility] - moderate COPD    Smokes 1/2 ppd   Retired   EKG: SR 77    Past Medical History:   Diagnosis Date    [facility] [address]     Anxiety     Asthma     Cancer ([facility] [address] 2018    squamous cell- eye brow - CC    Chronic constipation     Colon polyps     COPD (chronic obstructive pulmonary disease) (HCC)     Cyst of nipple     Endometriosis     Fibromyalgia     Goiter     Goiter     Hyperlipidemia     Hypertension     Hypothyroidism     Osteoarthritis     Thyroid ca ([address]     TMJ (dislocation of temporomandibular joint)     TMJ arthritis        Past Surgical History:   Procedure Laterality Date    COLONOSCOPY      DIAGNOSTIC CARDIAC CATH LAB PROCEDURE  04/18/2019    THYROIDECTOMY      THYROIDECTOMY Left 1/6/2015       Family History   Problem Relation Age of Onset    Heart Disease Mother 61        CABG    Cancer Mother [de-identified]        lung    High Blood Pressure Mother     Heart Disease Father 48    High Blood Pressure Maternal Grandmother     High Blood Pressure Paternal Grandmother     Heart Attack Neg Hx     Hypertension Neg Hx     Diabetes Neg Hx        Social History     Socioeconomic History    Marital status:      Spouse name: None    Number of children: None    Years of education: None    Highest education level: None   Occupational History    None   Tobacco Use    Smoking status: Current Some Day Smoker     Packs/day: 1.00     Years: 45.00     Pack years: 45.00     Types: Cigarettes     Start date: 1966    Smokeless tobacco: Never Used    Tobacco comment: 9/23/19 now smoking 1 cig twice per day, had quit for 7 years at one point   Substance and Sexual Activity    Alcohol use: Yes    Drug use: No    Sexual activity: Yes     Partners: Male   Other Topics Concern    None   Social History Narrative    None     Social Determinants of Health     Financial Resource Strain: Low Risk     Difficulty of Paying Living Expenses: Not hard at all   Food Insecurity: No Food Insecurity    Worried About Running Out of Food in the Last Year: Never true    Patsy of Food in the Last Year: Never true   Transportation Needs: No Transportation Needs    Lack of Transportation (Medical): No    Lack of Transportation (Non-Medical):  No   Physical Activity:     Days of Exercise per Week: Not on file    Minutes of Exercise per Session: Not on file   Stress:     Feeling of Stress : Not on file   Social Connections:     Frequency of Communication with Friends and Family: Not on file    Frequency of Social Gatherings with Friends and Family: Not on file    Attends Restorationism Services: Not on file    Active Member of Clubs or Organizations: Not on file    Attends Club or Organization Meetings: Not on file    Marital Status: Not on file   Intimate Partner Violence:     Fear of Current or Ex-Partner: Not on file    Emotionally Abused: Not on file    Physically Abused: Not on file    Sexually Abused: Not on file   Housing Stability:     Unable to Pay for Housing in the Last Year: Not on file    Number of [place] in the Last Year: Not on file    Unstable Housing in the Last Year: Not on file       Allergies   Allergen Reactions    Morphine      Tremor    Amoxil [Amoxicillin]      diarrhea    Cefdinir Itching and Other (See Comments)    Fosamax [Alendronate] Other (See Comments)     Muscle aches      Pantoprazole Other (See Comments)     abdominal cramping, uncontrolled BM.  Simvastatin      muscle soreness.  Zetia [Ezetimibe]     Zocor [Simvastatin - High Dose]     Adhesive Tape Rash       Current Outpatient Medications   Medication Sig Dispense Refill    ALPRAZolam (XANAX) 0.5 MG tablet Take 0.5 mg by mouth daily.  budesonide-formoterol (SYMBICORT) 160-4.5 MCG/ACT AERO Inhale 2 puffs into the lungs 2 times daily 1 each 2    conjugated estrogens (PREMARIN) 0.625 MG/GM vaginal cream Place vaginally daily.  1 each 2    cetirizine (ZYRTEC) 10 MG tablet Take 1 tablet by mouth nightly as needed for Allergies or Rhinitis 30 tablet 5    pitavastatin (LIVALO) 1 MG TABS tablet Take 0.5 tablets by mouth nightly 45 tablet 3    citalopram (CELEXA) 40 MG tablet Take 1 tablet by mouth once daily 90 tablet 1    meclofenamate (MECLOMEN) 100 MG capsule TAKE 1 CAPSULE BY MOUTH DAILY AS NEEDED 90 capsule 0    albuterol (ACCUNEB) 0.63 MG/3ML nebulizer solution Take 1 ampule by nebulization every 6 hours as needed for Wheezing      dilTIAZem (CARDIZEM) 60 MG tablet Take 1 tablet by mouth 2 times daily 180 tablet 3    montelukast (SINGULAIR) 10 MG tablet Take 10 mg by mouth nightly      fexofenadine (ALLEGRA) 180 MG tablet Take 180 mg by mouth daily      fluticasone (FLONASE) 50 MCG/ACT nasal spray USE 1 SPRAY INTO EACH NOSTRIL ONCE DAILY 1 Bottle 2    aspirin 81 MG tablet Take 1 tablet by mouth daily With Food 30 tablet 3    albuterol sulfate  (90 Base) MCG/ACT inhaler 2 puffs every 6 hours 1 Inhaler 5    levothyroxine (SYNTHROID) 50 MCG tablet Take 50 mcg by mouth daily On Tuesday and Thursday; Saturday  she takes 2 tabs. On Monday,Wednesday,Friday she takes 2 1/2 tabs  3     No current facility-administered medications for this visit. Review of Systems:   Review of Systems   Constitutional: Negative. Negative for diaphoresis and fatigue. HENT: Negative. Eyes: Negative. Respiratory: Negative. Negative for cough, chest tightness, shortness of breath, wheezing and stridor. Cardiovascular: Negative. Negative for chest pain, palpitations and leg swelling. Gastrointestinal: Negative. Negative for blood in stool and nausea. Genitourinary: Negative. Musculoskeletal: Negative. Skin: Negative. Neurological: Negative. Negative for dizziness, syncope, weakness and light-headedness. Hematological: Negative. Psychiatric/Behavioral: Negative. Physical Examination:    /78   Pulse 71   Resp 16   Ht 5' 5\" (1.651 m)   Wt 122 lb 3.2 oz (55.4 kg)   LMP 01/09/1995   SpO2 94%   BMI 20.34 kg/m²    Physical Exam   Constitutional: She appears healthy. No distress. HENT:   Normal cephalic and Atraumatic   Eyes: Pupils are equal, round, and reactive to light. Neck: Thyroid normal. No JVD present. No neck adenopathy. No thyromegaly present. Cardiovascular: Normal rate, regular rhythm, intact distal pulses and normal pulses. Murmur heard. Pulses:       Carotid pulses are on the right side with bruit and on the left side with bruit. Pulmonary/Chest: Effort normal. She has no rales. She has diffuse wheezes. She exhibits no tenderness. Abdominal: Soft.  Bowel sounds are normal. There is no Results   Component Value Date    TSH 2.760 12/24/2020       Patient Active Problem List   Diagnosis    Goiter    [facility]) [facility] [address]    GERD (gastroesophageal reflux disease)    Mixed hyperlipidemia    Hypothyroidism    Generalized anxiety disorder    Tobacco abuse    Intra-abdominal hematoma    Chest pain    Bilateral carotid bruits    Essential hypertension    Atrial fibrillation ([facility] [address]    Coronary artery disease involving native coronary artery of native heart without angina pectoris    Hyperlipidemia    Bilateral carotid artery stenosis    Simple chronic bronchitis (HCC)    Angina effort    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 Modified Medications    No medications on file       No orders of the defined types were placed in this encounter. Assessment/Plan:    1. Essential hypertension  Stable - continue meds. Low salt diet      2. Coronary artery disease involving native coronary artery of native heart without angina pectoris  No angina - continue meds    3. Carotid - mild - CUS. Will continue surveillance    4. R Lung nodule- repeat CT chesst scheduled for 6 mo. If worse at tthat time corona Pulmonary eval - stable in f/u. - now seeing [facility] Doctor. 5.Claudication Cold feet - PVR - stable      Counseling:  Heart Healthy Lifestyle, Stop Smoking, Low Salt Diet, Take Precautions to Prevent Falls, Regular Exercise and Walk Daily    Return in about 4 months (around 5/24/2022).       Electronically signed by Shayy Fernandez MD on 1/24/2022 at 12:10 PM

## 2022-01-27 ENCOUNTER — HOSPITAL ENCOUNTER (OUTPATIENT)
Dept: ULTRASOUND IMAGING | Age: 72
Discharge: HOME | End: 2022-01-29
Payer: MEDICARE

## 2022-01-27 DIAGNOSIS — I65.23 BILATERAL CAROTID ARTERY STENOSIS: ICD-10-CM

## 2022-01-27 PROCEDURE — 93880 EXTRACRANIAL BILAT STUDY: CPT | Performed by: INTERNAL MEDICINE

## 2022-01-27 PROCEDURE — 93880 EXTRACRANIAL BILAT STUDY: CPT

## 2022-01-31 DIAGNOSIS — F41.1 GENERALIZED ANXIETY DISORDER: Primary | ICD-10-CM

## 2022-01-31 RX ORDER — ALPRAZOLAM 0.5 MG/1
0.5 TABLET ORAL DAILY
Qty: 30 TABLET | Refills: 1 | Status: SHIPPED | OUTPATIENT
Start: 2022-01-31 | End: 2022-04-05

## 2022-01-31 RX ORDER — ALPRAZOLAM 0.5 MG/1
0.5 TABLET ORAL DAILY
OUTPATIENT
Start: 2022-01-31

## 2022-02-01 DIAGNOSIS — I10 ESSENTIAL HYPERTENSION: ICD-10-CM

## 2022-02-01 DIAGNOSIS — E78.2 MIXED HYPERLIPIDEMIA: ICD-10-CM

## 2022-02-01 RX ORDER — PITAVASTATIN CALCIUM 1.04 MG/1
0.5 TABLET, FILM COATED ORAL NIGHTLY
Qty: 45 TABLET | Refills: 3 | Status: CANCELLED | OUTPATIENT
Start: 2022-02-01

## 2022-02-08 DIAGNOSIS — I10 ESSENTIAL HYPERTENSION: ICD-10-CM

## 2022-02-08 DIAGNOSIS — E78.2 MIXED HYPERLIPIDEMIA: ICD-10-CM

## 2022-02-15 DIAGNOSIS — J44.1 COPD WITH ACUTE EXACERBATION (HCC): ICD-10-CM

## 2022-02-15 RX ORDER — CETIRIZINE HYDROCHLORIDE 10 MG/1
10 TABLET ORAL NIGHTLY PRN
Qty: 30 TABLET | Refills: 5 | Status: SHIPPED | OUTPATIENT
Start: 2022-02-15 | End: 2022-08-31

## 2022-03-09 RX ORDER — CITALOPRAM 40 MG/1
TABLET ORAL
Qty: 90 TABLET | Refills: 2 | Status: SHIPPED | OUTPATIENT
Start: 2022-03-09

## 2022-03-09 NOTE — TELEPHONE ENCOUNTER
Patient is requesting an order for her annual mammogram and refill    Rx request   Requested Prescriptions     Pending Prescriptions Disp Refills    citalopram (CELEXA) 40 MG tablet 90 tablet 1     Sig: Take 1 tablet by mouth once daily     LOV 9/27/2021  Next Visit Date:  Future Appointments   Date Time Provider Lashonda Villagran   5/23/2022  1:00 PM Charles Vu MD [facility]

## 2022-03-14 ENCOUNTER — TELEPHONE (OUTPATIENT)
Dept: FAMILY MEDICINE CLINIC | Age: 72
End: 2022-03-14

## 2022-03-14 DIAGNOSIS — Z12.31 SCREENING MAMMOGRAM, ENCOUNTER FOR: Primary | ICD-10-CM

## 2022-03-17 DIAGNOSIS — I10 ESSENTIAL HYPERTENSION: Primary | ICD-10-CM

## 2022-03-17 RX ORDER — DILTIAZEM HYDROCHLORIDE 60 MG/1
TABLET, FILM COATED ORAL
Qty: 180 TABLET | Refills: 3 | Status: SHIPPED | OUTPATIENT
Start: 2022-03-17

## 2022-03-25 ENCOUNTER — HOSPITAL ENCOUNTER (OUTPATIENT)
Dept: WOMENS IMAGING | Age: 72
Discharge: HOME | End: 2022-03-27
Payer: MEDICARE

## 2022-03-25 DIAGNOSIS — Z12.31 SCREENING MAMMOGRAM, ENCOUNTER FOR: ICD-10-CM

## 2022-03-25 PROCEDURE — 77063 BREAST TOMOSYNTHESIS BI: CPT

## 2022-04-05 DIAGNOSIS — F41.1 GENERALIZED ANXIETY DISORDER: ICD-10-CM

## 2022-04-05 DIAGNOSIS — J44.1 COPD WITH ACUTE EXACERBATION (HCC): ICD-10-CM

## 2022-04-05 RX ORDER — GUAIFENESIN 600 MG/1
TABLET, EXTENDED RELEASE ORAL
Qty: 60 TABLET | Refills: 0 | Status: SHIPPED | OUTPATIENT
Start: 2022-04-05 | End: 2022-05-08

## 2022-04-05 RX ORDER — ALPRAZOLAM 0.5 MG/1
TABLET ORAL
Qty: 30 TABLET | Refills: 0 | Status: SHIPPED | OUTPATIENT
Start: 2022-04-05 | End: 2022-05-05

## 2022-04-13 ENCOUNTER — TELEPHONE (OUTPATIENT)
Dept: FAMILY MEDICINE CLINIC | Age: 72
End: 2022-04-13

## 2022-04-14 ENCOUNTER — IMMUNIZATION (OUTPATIENT)
Dept: FAMILY MEDICINE CLINIC | Age: 72
End: 2022-04-14
Payer: MEDICARE

## 2022-04-14 PROCEDURE — 91306 COVID-19, MODERNA BOOSTER VACCINE 0.25ML DOSE: CPT | Performed by: FAMILY MEDICINE

## 2022-04-14 PROCEDURE — 0064A COVID-19, MODERNA BOOSTER VACCINE 0.25ML DOSE: CPT | Performed by: FAMILY MEDICINE

## 2022-05-06 DIAGNOSIS — F41.1 GENERALIZED ANXIETY DISORDER: ICD-10-CM

## 2022-05-06 DIAGNOSIS — J44.1 COPD WITH ACUTE EXACERBATION (HCC): ICD-10-CM

## 2022-05-08 RX ORDER — ALPRAZOLAM 0.5 MG/1
TABLET ORAL
Qty: 30 TABLET | Refills: 1 | Status: SHIPPED | OUTPATIENT
Start: 2022-05-08 | End: 2022-07-06

## 2022-05-08 RX ORDER — GUAIFENESIN 600 MG/1
TABLET, EXTENDED RELEASE ORAL
Qty: 60 TABLET | Refills: 5 | Status: SHIPPED | OUTPATIENT
Start: 2022-05-08

## 2022-05-23 ENCOUNTER — OFFICE VISIT (OUTPATIENT)
Dept: CARDIOLOGY CLINIC | Age: 72
End: 2022-05-23
Payer: MEDICARE

## 2022-05-23 VITALS
DIASTOLIC BLOOD PRESSURE: 74 MMHG | WEIGHT: 121 LBS | SYSTOLIC BLOOD PRESSURE: 132 MMHG | HEART RATE: 75 BPM | OXYGEN SATURATION: 95 % | BODY MASS INDEX: 20.14 KG/M2

## 2022-05-23 DIAGNOSIS — I65.23 BILATERAL CAROTID ARTERY STENOSIS: ICD-10-CM

## 2022-05-23 DIAGNOSIS — E78.2 MIXED HYPERLIPIDEMIA: ICD-10-CM

## 2022-05-23 DIAGNOSIS — E78.5 HYPERLIPIDEMIA, UNSPECIFIED HYPERLIPIDEMIA TYPE: ICD-10-CM

## 2022-05-23 DIAGNOSIS — I20.8 EXERCISE-INDUCED ANGINA (HCC): ICD-10-CM

## 2022-05-23 DIAGNOSIS — I73.9 CLAUDICATION (HCC): ICD-10-CM

## 2022-05-23 DIAGNOSIS — I10 ESSENTIAL HYPERTENSION: Primary | ICD-10-CM

## 2022-05-23 DIAGNOSIS — I48.91 ATRIAL FIBRILLATION, UNSPECIFIED TYPE (HCC): ICD-10-CM

## 2022-05-23 DIAGNOSIS — I25.10 CORONARY ARTERY DISEASE INVOLVING NATIVE CORONARY ARTERY OF NATIVE HEART WITHOUT ANGINA PECTORIS: ICD-10-CM

## 2022-05-23 PROCEDURE — 99214 OFFICE O/P EST MOD 30 MIN: CPT | Performed by: INTERNAL MEDICINE

## 2022-05-23 RX ORDER — BUDESONIDE AND FORMOTEROL FUMARATE DIHYDRATE 160; 4.5 UG/1; UG/1
AEROSOL RESPIRATORY (INHALATION)
Qty: 11 G | Refills: 5 | Status: SHIPPED | OUTPATIENT
Start: 2022-05-23

## 2022-05-23 ASSESSMENT — ENCOUNTER SYMPTOMS
RESPIRATORY NEGATIVE: 1
STRIDOR: 0
GASTROINTESTINAL NEGATIVE: 1
BLOOD IN STOOL: 0
NAUSEA: 0
CHEST TIGHTNESS: 0
COUGH: 0
SHORTNESS OF BREATH: 0
EYES NEGATIVE: 1
WHEEZING: 0

## 2022-05-23 NOTE — PROGRESS NOTES
Subsequent Progress Note  Patient: Sam Nicholas  YOB: 1950  MRN: [id number]    Chief Complaint: carotid htn carotid cad  Chief Complaint   Patient presents with    Coronary Artery Disease    Hypertension       CV Data:  2/2017 spect negative ccf  2/2017 PVR negative ccf  1/2019 CUS extensive atherosclerosis R>L  1/19 echo ef 60   4/5/2019 CTA neck - CHERELLE 22%  LICA 34  8/55/0522 CATH LAD Mild RCA 20% EF 55 EDP 5   S/P Complete Thyroidectomy   12/2020 PVR negative  12/2020 CUS CHERELLE 50-69%   1/22 CUS B/L 50%    Subjective/HPI: no cp no sob no falls no bleed tolerating only 1/2 tab Livalo. Full tab caused severe myalgia. LDL not to goal.      1/13/2020 no cp no sob no falls no bleed f/u CT chest mild enlarged R nodule. 12/7/2020 still smokes. No cp no palp no falls no bleeed no sob    1/11/21 TELEHEALTH EVALUATION -- Audio/Visual (During [place]14 public health emergency)    No cp no sob no falls no bleed. Eats well still smokes    5/12/21 TELEHEALTH EVALUATION -- Audio/Visual (During [place]-10 public health emergency)    No cp no sob no falls no bleed take smeds doing well    9/16/21 now has bronchitis. No cp no sob no falls occ hemorrhoid bleed. Takes med    1/24/22 doing well no cp no sob no bleed. No falls takes meds.  [facility]FT at [facility] - moderate COPD    5/23/22 doing well no cp no so bno falls takes meds    Smokes 1/2 ppd   Retired   EKG: SR 66    Past Medical History:   Diagnosis Date    [facility] [address]     Anxiety     Asthma     Cancer ([facility] [address] 2018    squamous cell- eye brow - CC    Chronic constipation     Colon polyps     COPD (chronic obstructive pulmonary disease) (HCC)     Cyst of nipple     Endometriosis     Fibromyalgia     Goiter     Goiter     Hyperlipidemia     Hypertension     Hypothyroidism     Osteoarthritis     Thyroid ca ([facility] [address]     TMJ (dislocation of temporomandibular joint)     TMJ arthritis        Past Surgical History:   Procedure Laterality Date    COLONOSCOPY      DIAGNOSTIC CARDIAC CATH LAB PROCEDURE  04/18/2019    THYROIDECTOMY      THYROIDECTOMY Left 1/6/2015       Family History   Problem Relation Age of Onset    Heart Disease Mother 61        CABG    Cancer Mother [de-identified]        lung    High Blood Pressure Mother     Heart Disease Father 48    High Blood Pressure Maternal Grandmother     High Blood Pressure Paternal Grandmother     Heart Attack Neg Hx     Hypertension Neg Hx     Diabetes Neg Hx     Breast Cancer Neg Hx        Social History     Socioeconomic History    Marital status:      Spouse name: Not on file    Number of children: Not on file    Years of education: Not on file    Highest education level: Not on file   Occupational History    Not on file   Tobacco Use    Smoking status: Current Some Day Smoker     Packs/day: 1.00     Years: 45.00     Pack years: 45.00     Types: Cigarettes     Start date: 1966    Smokeless tobacco: Never Used    Tobacco comment: 9/23/19 now smoking 1 cig twice per day, had quit for 7 years at one point   Substance and Sexual Activity    Alcohol use: Yes    Drug use: No    Sexual activity: Yes     Partners: Male   Other Topics Concern    Not on file   Social History Narrative    Not on file     Social Determinants of Health     Financial Resource Strain:     Difficulty of Paying Living Expenses: Not on file   Food Insecurity:     Worried About Running Out of Food in the Last Year: Not on file    Patsy of Food in the Last Year: Not on file   Transportation Needs:     Lack of Transportation (Medical): Not on file    Lack of Transportation (Non-Medical):  Not on file   Physical Activity:     Days of Exercise per Week: Not on file    Minutes of Exercise per Session: Not on file   Stress:     Feeling of Stress : Not on file   Social Connections:     Frequency of Communication with Friends and Family: Not on file    Frequency of Social Gatherings with Friends and Family: Not on file  Attends Shinto Services: Not on file    Active Member of Clubs or Organizations: Not on file    Attends Club or Organization Meetings: Not on file    Marital Status: Not on file   Intimate Partner Violence:     Fear of Current or Ex-Partner: Not on file    Emotionally Abused: Not on file    Physically Abused: Not on file    Sexually Abused: Not on file   Housing Stability:     Unable to Pay for Housing in the Last Year: Not on file    Number of [place] in the Last Year: Not on file    Unstable Housing in the Last Year: Not on file       Allergies   Allergen Reactions    Morphine      Tremor    Amoxil [Amoxicillin]      diarrhea    Cefdinir Itching and Other (See Comments)    Fosamax [Alendronate] Other (See Comments)     Muscle aches      Pantoprazole Other (See Comments)     abdominal cramping, uncontrolled BM.  Simvastatin      muscle soreness.  Zetia [Ezetimibe]     Zocor [Simvastatin - High Dose]     Adhesive Tape Rash       Current Outpatient Medications   Medication Sig Dispense Refill    ALPRAZolam (XANAX) 0.5 MG tablet Take 1 tablet by mouth once daily 30 tablet 1    EQ MUCUS  MG extended release tablet Take 1 tablet by mouth twice daily 60 tablet 5    dilTIAZem (CARDIZEM) 60 MG tablet Take 1 tablet by mouth twice daily 180 tablet 3    citalopram (CELEXA) 40 MG tablet Take 1 tablet by mouth once daily 90 tablet 2    cetirizine (ZYRTEC) 10 MG tablet TAKE 1 TABLET BY MOUTH NIGHTLY AS NEEDED FOR ALLERGIES OR RHINITIS 30 tablet 5    budesonide-formoterol (SYMBICORT) 160-4.5 MCG/ACT AERO Inhale 2 puffs into the lungs 2 times daily 1 each 2    conjugated estrogens (PREMARIN) 0.625 MG/GM vaginal cream Place vaginally daily.  1 each 2    pitavastatin (LIVALO) 1 MG TABS tablet Take 0.5 tablets by mouth nightly 45 tablet 3    meclofenamate (MECLOMEN) 100 MG capsule TAKE 1 CAPSULE BY MOUTH DAILY AS NEEDED 90 capsule 0    albuterol (ACCUNEB) 0.63 MG/3ML nebulizer solution Take 1 ampule by nebulization every 6 hours as needed for Wheezing      montelukast (SINGULAIR) 10 MG tablet Take 10 mg by mouth nightly      fexofenadine (ALLEGRA) 180 MG tablet Take 180 mg by mouth daily      fluticasone (FLONASE) 50 MCG/ACT nasal spray USE 1 SPRAY INTO EACH NOSTRIL ONCE DAILY 1 Bottle 2    aspirin 81 MG tablet Take 1 tablet by mouth daily With Food 30 tablet 3    albuterol sulfate  (90 Base) MCG/ACT inhaler 2 puffs every 6 hours 1 Inhaler 5    levothyroxine (SYNTHROID) 50 MCG tablet Take 50 mcg by mouth daily On Tuesday and Thursday; Saturday  she takes 2 tabs. On Monday,Wednesday,Friday she takes 2 1/2 tabs  3     No current facility-administered medications for this visit. Review of Systems:   Review of Systems   Constitutional: Negative. Negative for diaphoresis and fatigue. HENT: Negative. Eyes: Negative. Respiratory: Negative. Negative for cough, chest tightness, shortness of breath, wheezing and stridor. Cardiovascular: Negative. Negative for chest pain, palpitations and leg swelling. Gastrointestinal: Negative. Negative for blood in stool and nausea. Genitourinary: Negative. Musculoskeletal: Negative. Skin: Negative. Neurological: Negative. Negative for dizziness, syncope, weakness and light-headedness. Hematological: Negative. Psychiatric/Behavioral: Negative. Physical Examination:    /74 (Site: Right Upper Arm, Position: Sitting, Cuff Size: Small Adult)   Pulse 75   Wt 121 lb (54.9 kg)   LMP 01/09/1995   SpO2 95%   BMI 20.14 kg/m²    Physical Exam   Constitutional: She appears healthy. No distress. HENT:   Normal cephalic and Atraumatic   Eyes: Pupils are equal, round, and reactive to light. Neck: Thyroid normal. No JVD present. No neck adenopathy. No thyromegaly present. Cardiovascular: Normal rate, regular rhythm and intact distal pulses. Murmur heard.   Pulses:       Carotid pulses are on the right side with bruit and on the left side with bruit. Dorsalis pedis pulses are 1+ on the right side and 0 on the left side. Posterior tibial pulses are 0 on the right side and 2+ on the left side. Pulmonary/Chest: Effort normal. She has no rales. She has diffuse wheezes. She exhibits no tenderness. Abdominal: Soft. Bowel sounds are normal. There is no abdominal tenderness. Musculoskeletal:         General: No tenderness or edema. Normal range of motion. Cervical back: Normal range of motion and neck supple. Neurological: She is alert and oriented to person, place, and time. Skin: Skin is warm. No cyanosis. Nails show no clubbing.        LABS:  CBC:   Lab Results   Component Value Date    WBC 4.6 12/24/2020    RBC 4.65 12/24/2020    HGB 14.4 12/24/2020    HCT 43.0 12/24/2020    MCV 92.4 12/24/2020    MCH 30.9 12/24/2020    MCHC 33.5 12/24/2020    RDW 14.0 12/24/2020     12/24/2020    MPV 9.6 10/09/2014     Lipids:  Lab Results   Component Value Date    CHOL 138 11/13/2019    CHOL 219 (H) 05/14/2019    CHOL 256 (H) 02/05/2019     Lab Results   Component Value Date    TRIG 160 (H) 11/13/2019    TRIG 104 05/14/2019    TRIG 116 02/05/2019     Lab Results   Component Value Date    HDL 76 (H) 12/24/2020    HDL 73 (H) 11/13/2019    HDL 76 (H) 05/14/2019     Lab Results   Component Value Date    LDLCALC 94 12/24/2020    LDLCALC 33 11/13/2019    LDLCALC 122 05/14/2019     No results found for: LABVLDL, VLDL  No results found for: CHOLHDLRATIO  CMP:    Lab Results   Component Value Date     12/24/2020    K 4.4 12/24/2020     12/24/2020    CO2 27 12/24/2020    BUN 13 12/24/2020    CREATININE 0.67 12/24/2020    GFRAA >60.0 12/24/2020    LABGLOM >60.0 12/24/2020    GLUCOSE 85 12/24/2020    PROT 6.8 12/24/2020    LABALBU 4.2 12/24/2020    CALCIUM 9.3 12/24/2020    BILITOT 0.3 12/24/2020    ALKPHOS 109 12/24/2020    AST 21 12/24/2020    ALT 17 12/24/2020     BMP:    Lab Results Component Value Date     12/24/2020    K 4.4 12/24/2020     12/24/2020    CO2 27 12/24/2020    BUN 13 12/24/2020    LABALBU 4.2 12/24/2020    CREATININE 0.67 12/24/2020    CALCIUM 9.3 12/24/2020    GFRAA >60.0 12/24/2020    LABGLOM >60.0 12/24/2020    GLUCOSE 85 12/24/2020     Magnesium:    Lab Results   Component Value Date    MG 2.1 12/24/2020     TSH:  Lab Results   Component Value Date    TSH 2.760 12/24/2020       Patient Active Problem List   Diagnosis    Goiter    COPD (chronic obstructive pulmonary disease) (HCC)    GERD (gastroesophageal reflux disease)    Mixed hyperlipidemia    Hypothyroidism    Generalized anxiety disorder    Tobacco abuse    Intra-abdominal hematoma    Chest pain    Bilateral carotid bruits    Essential hypertension    Atrial fibrillation (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 Modified Medications    No medications on file       No orders of the defined types were placed in this encounter. Assessment/Plan:    1. Essential hypertension  Stable - continue meds. Low salt diet      2. Coronary artery disease involving native coronary artery of native heart without angina pectoris  No angina - continue meds    3. Carotid - mild - CUS. Will continue surveillance    4. R Lung nodule- repeat CT chesst scheduled for 6 mo. If worse at tthat time [facility] Pulmonary eval - stable in f/u. - now seeing [facility] Doctor. 5.Claudication Cold feet - PVR - stable     Fasting labs now.       Counseling:  Heart Healthy Lifestyle, Stop Smoking, Low Salt Diet, Take Precautions to Prevent Falls, Regular Exercise and Walk Daily    Return in about 4 months (around 9/23/2022).       Electronically signed by Miranda Westfall MD on 5/23/2022 at 1:25 PM

## 2022-05-24 DIAGNOSIS — I10 ESSENTIAL HYPERTENSION: ICD-10-CM

## 2022-05-24 DIAGNOSIS — E78.5 HYPERLIPIDEMIA, UNSPECIFIED HYPERLIPIDEMIA TYPE: ICD-10-CM

## 2022-05-24 LAB
ALBUMIN SERPL-MCNC: 4.2 G/DL (ref 3.5–4.6)
ALP BLD-CCNC: 100 U/L (ref 40–130)
ALT SERPL-CCNC: 14 U/L (ref 0–33)
ANION GAP SERPL CALCULATED.3IONS-SCNC: 10 MEQ/L (ref 9–15)
AST SERPL-CCNC: 19 U/L (ref 0–35)
BILIRUB SERPL-MCNC: 0.3 MG/DL (ref 0.2–0.7)
BUN BLDV-MCNC: 12 MG/DL (ref 8–23)
CALCIUM SERPL-MCNC: 9.1 MG/DL (ref 8.5–9.9)
CHLORIDE BLD-SCNC: 105 MEQ/L (ref 95–107)
CHOLESTEROL, FASTING: 209 MG/DL (ref 0–199)
CO2: 26 MEQ/L (ref 20–31)
CREAT SERPL-MCNC: 0.68 MG/DL (ref 0.5–0.9)
GFR AFRICAN AMERICAN: >60
GFR NON-AFRICAN AMERICAN: >60
GLOBULIN: 2.2 G/DL (ref 2.3–3.5)
GLUCOSE BLD-MCNC: 91 MG/DL (ref 70–99)
HCT VFR BLD CALC: 42.2 % (ref 37–47)
HDLC SERPL-MCNC: 77 MG/DL (ref 40–59)
HEMOGLOBIN: 13.9 G/DL (ref 12–16)
LDL CHOLESTEROL CALCULATED: 115 MG/DL (ref 0–129)
MAGNESIUM: 2.1 MG/DL (ref 1.7–2.4)
MCH RBC QN AUTO: 30.6 PG (ref 27–31.3)
MCHC RBC AUTO-ENTMCNC: 33.1 % (ref 33–37)
MCV RBC AUTO: 92.7 FL (ref 82–100)
PDW BLD-RTO: 13.8 % (ref 11.5–14.5)
PLATELET # BLD: 179 K/UL (ref 130–400)
POTASSIUM SERPL-SCNC: 4.2 MEQ/L (ref 3.4–4.9)
RBC # BLD: 4.55 M/UL (ref 4.2–5.4)
SODIUM BLD-SCNC: 141 MEQ/L (ref 135–144)
TOTAL PROTEIN: 6.4 G/DL (ref 6.3–8)
TRIGLYCERIDE, FASTING: 87 MG/DL (ref 0–150)
TSH SERPL DL<=0.05 MIU/L-ACNC: 1.16 UIU/ML (ref 0.44–3.86)
WBC # BLD: 4.2 K/UL (ref 4.8–10.8)

## 2022-06-06 ENCOUNTER — TELEPHONE (OUTPATIENT)
Dept: CARDIOLOGY CLINIC | Age: 72
End: 2022-06-06

## 2022-06-06 NOTE — TELEPHONE ENCOUNTER
Patient aware and will try the full tablet, but last time she tried she had muscle aches. She will keep us updated and requests not to change in the chart until she calls to see if it is tolerated.

## 2022-06-30 ENCOUNTER — TELEPHONE (OUTPATIENT)
Dept: FAMILY MEDICINE CLINIC | Age: 72
End: 2022-06-30

## 2022-06-30 NOTE — TELEPHONE ENCOUNTER
Spoke with patient to schedule Medicare AWV, patient stated she already had an AWV in January 2022, I advised she has not seeing Celena since 9/27/2021. Patient will call back to confirm.

## 2022-07-05 DIAGNOSIS — F41.1 GENERALIZED ANXIETY DISORDER: ICD-10-CM

## 2022-07-05 NOTE — TELEPHONE ENCOUNTER
Rx requested:  Requested Prescriptions     Pending Prescriptions Disp Refills    ALPRAZolam (XANAX) 0.5 MG tablet [Pharmacy Med Name: ALPRAZolam 0.5 MG Oral Tablet] 30 tablet 0     Sig: Take 1 tablet by mouth once daily         Last Office Visit:   9/27/2021      Next Visit Date:  Future Appointments   Date Time Provider Lashonda Villagran   7/15/2022 11:00 AM Mini Atkinson PA-C [facility]   9/26/2022  1:30 PM Donavon Clark MD [address] [place] [address]

## 2022-07-06 RX ORDER — ALPRAZOLAM 0.5 MG/1
TABLET ORAL
Qty: 30 TABLET | Refills: 0 | Status: SHIPPED | OUTPATIENT
Start: 2022-07-06 | End: 2022-08-07 | Stop reason: SDUPTHER

## 2022-07-15 ENCOUNTER — OFFICE VISIT (OUTPATIENT)
Dept: FAMILY MEDICINE CLINIC | Age: 72
End: 2022-07-15
Payer: MEDICARE

## 2022-07-15 VITALS
HEIGHT: 65 IN | DIASTOLIC BLOOD PRESSURE: 74 MMHG | OXYGEN SATURATION: 97 % | HEART RATE: 67 BPM | TEMPERATURE: 98 F | SYSTOLIC BLOOD PRESSURE: 130 MMHG | BODY MASS INDEX: 20.16 KG/M2 | WEIGHT: 121 LBS

## 2022-07-15 DIAGNOSIS — Z00.00 MEDICARE ANNUAL WELLNESS VISIT, SUBSEQUENT: Primary | ICD-10-CM

## 2022-07-15 DIAGNOSIS — F41.9 ANXIETY: ICD-10-CM

## 2022-07-15 DIAGNOSIS — F99 INSOMNIA DUE TO OTHER MENTAL DISORDER: ICD-10-CM

## 2022-07-15 DIAGNOSIS — H61.23 BILATERAL IMPACTED CERUMEN: ICD-10-CM

## 2022-07-15 DIAGNOSIS — F51.05 INSOMNIA DUE TO OTHER MENTAL DISORDER: ICD-10-CM

## 2022-07-15 PROCEDURE — G0439 PPPS, SUBSEQ VISIT: HCPCS | Performed by: PHYSICIAN ASSISTANT

## 2022-07-15 PROCEDURE — 1123F ACP DISCUSS/DSCN MKR DOCD: CPT | Performed by: PHYSICIAN ASSISTANT

## 2022-07-15 PROCEDURE — 99212 OFFICE O/P EST SF 10 MIN: CPT | Performed by: PHYSICIAN ASSISTANT

## 2022-07-15 RX ORDER — HYDROXYZINE HYDROCHLORIDE 25 MG/1
25 TABLET, FILM COATED ORAL EVERY 8 HOURS PRN
Qty: 90 TABLET | Refills: 2 | Status: SHIPPED | OUTPATIENT
Start: 2022-07-15 | End: 2022-07-25

## 2022-07-15 SDOH — ECONOMIC STABILITY: FOOD INSECURITY: WITHIN THE PAST 12 MONTHS, YOU WORRIED THAT YOUR FOOD WOULD RUN OUT BEFORE YOU GOT MONEY TO BUY MORE.: NEVER TRUE

## 2022-07-15 SDOH — ECONOMIC STABILITY: FOOD INSECURITY: WITHIN THE PAST 12 MONTHS, THE FOOD YOU BOUGHT JUST DIDN'T LAST AND YOU DIDN'T HAVE MONEY TO GET MORE.: NEVER TRUE

## 2022-07-15 ASSESSMENT — LIFESTYLE VARIABLES
HOW OFTEN DURING THE LAST YEAR HAVE YOU HAD A FEELING OF GUILT OR REMORSE AFTER DRINKING: 0
HOW OFTEN DURING THE LAST YEAR HAVE YOU FOUND THAT YOU WERE NOT ABLE TO STOP DRINKING ONCE YOU HAD STARTED: 0
HOW OFTEN DURING THE LAST YEAR HAVE YOU FAILED TO DO WHAT WAS NORMALLY EXPECTED FROM YOU BECAUSE OF DRINKING: 1
HOW OFTEN DURING THE LAST YEAR HAVE YOU BEEN UNABLE TO REMEMBER WHAT HAPPENED THE NIGHT BEFORE BECAUSE YOU HAD BEEN DRINKING: 0
HOW OFTEN DURING THE LAST YEAR HAVE YOU NEEDED AN ALCOHOLIC DRINK FIRST THING IN THE MORNING TO GET YOURSELF GOING AFTER A NIGHT OF HEAVY DRINKING: 0
HOW OFTEN DO YOU HAVE A DRINK CONTAINING ALCOHOL: MONTHLY OR LESS
HAVE YOU OR SOMEONE ELSE BEEN INJURED AS A RESULT OF YOUR DRINKING: 0
HAS A RELATIVE, FRIEND, DOCTOR, OR ANOTHER HEALTH PROFESSIONAL EXPRESSED CONCERN ABOUT YOUR DRINKING OR SUGGESTED YOU CUT DOWN: 0
HOW MANY STANDARD DRINKS CONTAINING ALCOHOL DO YOU HAVE ON A TYPICAL DAY: 1 OR 2

## 2022-07-15 ASSESSMENT — PATIENT HEALTH QUESTIONNAIRE - PHQ9
2. FEELING DOWN, DEPRESSED OR HOPELESS: 0
SUM OF ALL RESPONSES TO PHQ QUESTIONS 1-9: 0
SUM OF ALL RESPONSES TO PHQ9 QUESTIONS 1 & 2: 0
1. LITTLE INTEREST OR PLEASURE IN DOING THINGS: 0

## 2022-07-15 ASSESSMENT — SOCIAL DETERMINANTS OF HEALTH (SDOH): HOW HARD IS IT FOR YOU TO PAY FOR THE VERY BASICS LIKE FOOD, HOUSING, MEDICAL CARE, AND HEATING?: NOT HARD AT ALL

## 2022-07-15 NOTE — PATIENT INSTRUCTIONS
Personalized Preventive Plan for Josh Keepers - 7/15/2022  Medicare offers a range of preventive health benefits. Some of the tests and screenings are paid in full while other may be subject to a deductible, co-insurance, and/or copay. Some of these benefits include a comprehensive review of your medical history including lifestyle, illnesses that may run in your family, and various assessments and screenings as appropriate. After reviewing your medical record and screening and assessments performed today your provider may have ordered immunizations, labs, imaging, and/or referrals for you. A list of these orders (if applicable) as well as your Preventive Care list are included within your After Visit Summary for your review. Other Preventive Recommendations:    A preventive eye exam performed by an eye specialist is recommended every 1-2 years to screen for glaucoma; cataracts, macular degeneration, and other eye disorders. A preventive dental visit is recommended every 6 months. Try to get at least 150 minutes of exercise per week or 10,000 steps per day on a pedometer . Order or download the FREE \"Exercise & Physical Activity: Your Everyday Guide\" from [employer]. Call 0-196.386.8951 or search [employer] online. You need 3000-0130 mg of calcium and 2864-0602 IU of vitamin D per day. It is possible to meet your calcium requirement with diet alone, but a vitamin D supplement is usually necessary to meet this goal.  When exposed to the sun, use a sunscreen that protects against both UVA and UVB radiation with an SPF of 30 or greater. Reapply every 2 to 3 hours or after sweating, drying off with a towel, or swimming. Always wear a seat belt when traveling in a car. Always wear a helmet when riding a bicycle or motorcycle. Personalized Preventive Plan for Josh Keepers - 7/15/2022  Medicare offers a range of preventive health benefits.  Some of the tests and screenings are paid in full while other may be subject to a deductible, co-insurance, and/or copay. Some of these benefits include a comprehensive review of your medical history including lifestyle, illnesses that may run in your family, and various assessments and screenings as appropriate. After reviewing your medical record and screening and assessments performed today your provider may have ordered immunizations, labs, imaging, and/or referrals for you. A list of these orders (if applicable) as well as your Preventive Care list are included within your After Visit Summary for your review. Other Preventive Recommendations:    A preventive eye exam performed by an eye specialist is recommended every 1-2 years to screen for glaucoma; cataracts, macular degeneration, and other eye disorders. A preventive dental visit is recommended every 6 months. Try to get at least 150 minutes of exercise per week or 10,000 steps per day on a pedometer . Order or download the FREE \"Exercise & Physical Activity: Your Everyday Guide\" from [employer]. Call 9-605.710.4563 or search [employer] online. You need 4610-9643 mg of calcium and 0332-3424 IU of vitamin D per day. It is possible to meet your calcium requirement with diet alone, but a vitamin D supplement is usually necessary to meet this goal.  When exposed to the sun, use a sunscreen that protects against both UVA and UVB radiation with an SPF of 30 or greater. Reapply every 2 to 3 hours or after sweating, drying off with a towel, or swimming. Always wear a seat belt when traveling in a car. Always wear a helmet when riding a bicycle or motorcycle. Personalized Preventive Plan for Liliana Luciano - 7/15/2022  Medicare offers a range of preventive health benefits. Some of the tests and screenings are paid in full while other may be subject to a deductible, co-insurance, and/or copay.     Some of these benefits include a comprehensive review of your medical history including lifestyle, illnesses that may run in your family, and various assessments and screenings as appropriate. After reviewing your medical record and screening and assessments performed today your provider may have ordered immunizations, labs, imaging, and/or referrals for you. A list of these orders (if applicable) as well as your Preventive Care list are included within your After Visit Summary for your review. Other Preventive Recommendations:    A preventive eye exam performed by an eye specialist is recommended every 1-2 years to screen for glaucoma; cataracts, macular degeneration, and other eye disorders. A preventive dental visit is recommended every 6 months. Try to get at least 150 minutes of exercise per week or 10,000 steps per day on a pedometer . Order or download the FREE \"Exercise & Physical Activity: Your Everyday Guide\" from [employer]. Call 7-136.576.8671 or search [employer] online. You need 8909-0553 mg of calcium and 4724-1850 IU of vitamin D per day. It is possible to meet your calcium requirement with diet alone, but a vitamin D supplement is usually necessary to meet this goal.  When exposed to the sun, use a sunscreen that protects against both UVA and UVB radiation with an SPF of 30 or greater. Reapply every 2 to 3 hours or after sweating, drying off with a towel, or swimming. Always wear a seat belt when traveling in a car. Always wear a helmet when riding a bicycle or motorcycle.

## 2022-07-19 LAB
6-ACETYLMORPHINE: NOT DETECTED
7-AMINOCLONAZEPAM: NOT DETECTED
ALPHA-OH-ALPRAZOLAM: NOT DETECTED
ALPHA-OH-MIDAZOLAM, URINE: NOT DETECTED
ALPRAZOLAM: NOT DETECTED
AMPHETAMINE: NOT DETECTED
BARBITURATES: NOT DETECTED
BENZOYLECGONINE: NOT DETECTED
BUPRENORPHINE: NOT DETECTED
CARISOPRODOL: NOT DETECTED
CLONAZEPAM: NOT DETECTED
CODEINE: NOT DETECTED
CREATININE URINE: <20 MG/DL (ref 20–400)
DIAZEPAM: NOT DETECTED
EER PAIN MGT DRUG PANEL, HIGH RES/EMIT U: ABNORMAL
ETHYL GLUCURONIDE: NOT DETECTED
FENTANYL: NOT DETECTED
GABAPENTIN: NOT DETECTED
HYDROCODONE: NOT DETECTED
HYDROMORPHONE: NOT DETECTED
LORAZEPAM: NOT DETECTED
MARIJUANA METABOLITE: NOT DETECTED
MDA: NOT DETECTED
MDEA: NOT DETECTED
MDMA URINE: NOT DETECTED
MEPERIDINE: NOT DETECTED
METHADONE: NOT DETECTED
METHAMPHETAMINE: NOT DETECTED
METHYLPHENIDATE: NOT DETECTED
MIDAZOLAM: NOT DETECTED
MORPHINE: NOT DETECTED
NALOXONE: NOT DETECTED
NORBUPRENORPHINE, FREE: NOT DETECTED
NORDIAZEPAM: NOT DETECTED
NORFENTANYL: NOT DETECTED
NORHYDROCODONE, URINE: NOT DETECTED
NOROXYCODONE: NOT DETECTED
NOROXYMORPHONE, URINE: NOT DETECTED
OXAZEPAM: NOT DETECTED
OXYCODONE: NOT DETECTED
OXYMORPHONE: NOT DETECTED
PAIN MANAGEMENT DRUG PANEL: ABNORMAL
PCP: NOT DETECTED
PHENTERMINE: NOT DETECTED
PREGABALIN: NOT DETECTED
TAPENTADOL, URINE: NOT DETECTED
TAPENTADOL-O-SULFATE, URINE: NOT DETECTED
TEMAZEPAM: NOT DETECTED
TRAMADOL: NOT DETECTED
ZOLPIDEM: NOT DETECTED

## 2022-07-27 LAB
6-ACETYLMORPHINE: NOT DETECTED
7-AMINOCLONAZEPAM: NOT DETECTED
ALPHA-OH-ALPRAZOLAM: PRESENT
ALPHA-OH-MIDAZOLAM, URINE: NOT DETECTED
ALPRAZOLAM: PRESENT
AMPHETAMINE: NOT DETECTED
BARBITURATES: NOT DETECTED
BENZOYLECGONINE: NOT DETECTED
BUPRENORPHINE: NOT DETECTED
CARISOPRODOL: NOT DETECTED
CLONAZEPAM: NOT DETECTED
CODEINE: NOT DETECTED
CREATININE URINE: 35.8 MG/DL (ref 20–400)
DIAZEPAM: NOT DETECTED
EER PAIN MGT DRUG PANEL, HIGH RES/EMIT U: NORMAL
ETHYL GLUCURONIDE: PRESENT
FENTANYL: NOT DETECTED
GABAPENTIN: NOT DETECTED
HYDROCODONE: NOT DETECTED
HYDROMORPHONE: NOT DETECTED
LORAZEPAM: NOT DETECTED
MARIJUANA METABOLITE: NOT DETECTED
MDA: NOT DETECTED
MDEA: NOT DETECTED
MDMA URINE: NOT DETECTED
MEPERIDINE: NOT DETECTED
METHADONE: NOT DETECTED
METHAMPHETAMINE: NOT DETECTED
METHYLPHENIDATE: NOT DETECTED
MIDAZOLAM: NOT DETECTED
MORPHINE: NOT DETECTED
NALOXONE: NOT DETECTED
NORBUPRENORPHINE, FREE: NOT DETECTED
NORDIAZEPAM: NOT DETECTED
NORFENTANYL: NOT DETECTED
NORHYDROCODONE, URINE: NOT DETECTED
NOROXYCODONE: NOT DETECTED
NOROXYMORPHONE, URINE: NOT DETECTED
OXAZEPAM: NOT DETECTED
OXYCODONE: NOT DETECTED
OXYMORPHONE: NOT DETECTED
PAIN MANAGEMENT DRUG PANEL: NORMAL
PCP: NOT DETECTED
PHENTERMINE: NOT DETECTED
PREGABALIN: NOT DETECTED
TAPENTADOL, URINE: NOT DETECTED
TAPENTADOL-O-SULFATE, URINE: NOT DETECTED
TEMAZEPAM: NOT DETECTED
TRAMADOL: NOT DETECTED
ZOLPIDEM: NOT DETECTED

## 2022-08-03 DIAGNOSIS — F41.1 GENERALIZED ANXIETY DISORDER: ICD-10-CM

## 2022-08-03 NOTE — TELEPHONE ENCOUNTER
Rx requested:  Requested Prescriptions     Pending Prescriptions Disp Refills    ALPRAZolam (XANAX) 0.5 MG tablet [Pharmacy Med Name: ALPRAZolam 0.5 MG Oral Tablet] 30 tablet 0     Sig: Take 1 tablet by mouth once daily         Last Office Visit:   7/15/2022      Next Visit Date:  Future Appointments   Date Time Provider Lashonda Sparkle   9/26/2022  1:30 PM [place] [address]   7/17/2023 11:00 AM Mini Wiseman PA-C [address], [place] [address]

## 2022-08-05 RX ORDER — ALPRAZOLAM 0.5 MG/1
TABLET ORAL
Qty: 30 TABLET | Refills: 0 | OUTPATIENT
Start: 2022-08-05 | End: 2022-09-04

## 2022-08-07 DIAGNOSIS — F41.1 GENERALIZED ANXIETY DISORDER: ICD-10-CM

## 2022-08-07 RX ORDER — ALPRAZOLAM 0.5 MG/1
TABLET ORAL
Qty: 30 TABLET | Refills: 1 | Status: SHIPPED | OUTPATIENT
Start: 2022-08-07 | End: 2022-10-05

## 2022-08-08 DIAGNOSIS — F41.1 GENERALIZED ANXIETY DISORDER: ICD-10-CM

## 2022-08-08 RX ORDER — ALPRAZOLAM 0.5 MG/1
TABLET ORAL
Qty: 30 TABLET | Refills: 1 | OUTPATIENT
Start: 2022-08-08 | End: 2022-09-06

## 2022-08-08 NOTE — TELEPHONE ENCOUNTER
Patient has been out of medication for 2 days now and would like this refilled. Patient stated new medication Hydroxyzine,she had bad side effects from this, had really bad leg spasms during the night.

## 2022-08-31 DIAGNOSIS — J44.1 COPD WITH ACUTE EXACERBATION (HCC): ICD-10-CM

## 2022-08-31 RX ORDER — CETIRIZINE HYDROCHLORIDE 10 MG/1
TABLET, FILM COATED ORAL
Qty: 90 TABLET | Refills: 1 | Status: SHIPPED | OUTPATIENT
Start: 2022-08-31 | End: 2022-10-10 | Stop reason: ALTCHOICE

## 2022-09-03 ENCOUNTER — SCHEDULED TELEPHONE ENCOUNTER (OUTPATIENT)
Dept: FAMILY MEDICINE CLINIC | Age: 72
End: 2022-09-03
Payer: MEDICARE

## 2022-09-03 DIAGNOSIS — U07.1 COVID: Primary | ICD-10-CM

## 2022-09-03 PROCEDURE — 99422 OL DIG E/M SVC 11-20 MIN: CPT | Performed by: PHYSICIAN ASSISTANT

## 2022-09-03 ASSESSMENT — ENCOUNTER SYMPTOMS
VOMITING: 0
RHINORRHEA: 1
SINUS PRESSURE: 0
WHEEZING: 0
COUGH: 1
ABDOMINAL PAIN: 0

## 2022-09-03 NOTE — PROGRESS NOTES
[address] Encounter  CHIEF COMPLAINT       Chief Complaint   Patient presents with    Positive For Covid-19       HISTORY OF PRESENT ILLNESS   Ralph Candelaria is a 70 y.o. female who presents with:  30-year-old female virtual visit for positive home COVID test.  Requesting antiviral treatment. Lives with  who currently has Bert. Had positive test today. Symptomatic the past 2 to 3 days with some sinus congestion. No chest pain or shortness of breath. No other associated symptoms or complaints or aggravating or alleviating factors. REVIEW OF SYSTEMS     Review of Systems   Constitutional:  Positive for fatigue. Negative for fever. HENT:  Positive for rhinorrhea. Negative for sinus pressure. Respiratory:  Positive for cough. Negative for wheezing. Cardiovascular:  Negative for chest pain. Gastrointestinal:  Negative for abdominal pain and vomiting. Skin:  Negative for rash. Neurological:  Negative for weakness. Hematological:  Negative for adenopathy. Psychiatric/Behavioral:  Negative for behavioral problems. PAST MEDICAL HISTORY         Diagnosis Date    A-fib ([facility] [address]     Anxiety     Asthma     Cancer ([facility] [address] 2018    squamous cell- eye brow - CC    Chronic constipation     Colon polyps     COPD (chronic obstructive pulmonary disease) (HCC)     Cyst of nipple     Endometriosis     Fibromyalgia     Goiter     Goiter     Hyperlipidemia     Hypertension     Hypothyroidism     Osteoarthritis     Thyroid ca ([facility] [address]     TMJ (dislocation of temporomandibular joint)     TMJ arthritis      SURGICAL HISTORY     Patient  has a past surgical history that includes Thyroidectomy; Colonoscopy; Thyroidectomy (Left, 1/6/2015); and Diagnostic Cardiac Cath Lab Procedure (04/18/2019).   CURRENT MEDICATIONS       Previous Medications    ALBUTEROL (ACCUNEB) 0.63 MG/3ML NEBULIZER SOLUTION    Take 1 ampule by nebulization every 6 hours as needed for Wheezing    ALBUTEROL SULFATE  (90 BASE) MCG/ACT INHALER    2 puffs every 6 hours    ALPRAZOLAM (XANAX) 0.5 MG TABLET    Take 1 tablet by mouth once daily    ASPIRIN 81 MG TABLET    Take 1 tablet by mouth daily With Food    BUDESONIDE-FORMOTEROL (SYMBICORT) 160-4.5 MCG/ACT AERO    Inhale 2 puffs by mouth twice daily    CITALOPRAM (CELEXA) 40 MG TABLET    Take 1 tablet by mouth once daily    CONJUGATED ESTROGENS (PREMARIN) 0.625 MG/GM VAGINAL CREAM    Place vaginally daily. DILTIAZEM (CARDIZEM) 60 MG TABLET    Take 1 tablet by mouth twice daily    EQ ALLERGY RELIEF, CETIRIZINE, 10 MG TABLET    TAKE 1 TABLET BY MOUTH NIGHTLY AS NEEDED FOR ALLERGIES OR RHINITIS    EQ MUCUS  MG EXTENDED RELEASE TABLET    Take 1 tablet by mouth twice daily    FEXOFENADINE (ALLEGRA) 180 MG TABLET    Take 180 mg by mouth daily    FLUTICASONE (FLONASE) 50 MCG/ACT NASAL SPRAY    USE 1 SPRAY INTO EACH NOSTRIL ONCE DAILY    LEVOTHYROXINE (SYNTHROID) 50 MCG TABLET    Take 50 mcg by mouth daily On Tuesday and Thursday; Saturday  she takes 2 tabs. On Monday,Wednesday,Friday she takes 2 1/2 tabs    MECLOFENAMATE (MECLOMEN) 100 MG CAPSULE    TAKE 1 CAPSULE BY MOUTH DAILY AS NEEDED    MONTELUKAST (SINGULAIR) 10 MG TABLET    Take 10 mg by mouth nightly    PITAVASTATIN (LIVALO) 1 MG TABS TABLET    Take 0.5 tablets by mouth nightly     ALLERGIES     Patient is is allergic to adhesive tape, morphine, amoxil [amoxicillin], cefdinir, fosamax [alendronate], pantoprazole, simvastatin, zetia [ezetimibe], and zocor [simvastatin - high dose]. FAMILY HISTORY     Patient'sfamily history includes Cancer (age of onset: [de-identified]) in her mother; Heart Disease (age of onset: 48) in her father; Heart Disease (age of onset: 61) in her mother; High Blood Pressure in her maternal grandmother, mother, and paternal grandmother. HISTORY     Patient  reports that she has been smoking cigarettes. She started smoking about 56 years ago. She has a 45.00 pack-year smoking history.  She has never used smokeless tobacco. She reports current alcohol use. She reports that she does not use drugs. PHYSICAL EXAM     VITALS   ,  ,  ,  ,    Physical Exam  READY CARE COURSE   Labs:  No results found for this visit on 09/03/22. IMAGING:  No orders to display     Scheduled Meds:  Continuous Infusions:  PRN Meds:. PROCEDURES:  FINAL IMPRESSION      1. COVID      DISPOSITION/PLAN   MDM  Virtual visit  80-year-old female positive home COVID test, requesting antiviral therapy. No shortness of breath. Paxlovid was prescribed. Instructed on red flag symptoms which would prompt ER evaluation. To quarantine as appropriate. PATIENT REFERRED TO:  No follow-ups on file. DISCHARGE MEDICATIONS:  New Prescriptions    NIRMATRELVIR/RITONAVIR (PAXLOVID) 20 X 150 MG & 10 X 100MG TBPK    Take 3 tablets (two 150 mg nirmatrelvir and one 100 mg ritonavir tablets) by mouth every 12 hours for 5 days. Cannot display discharge medications since this is not an admission.        Alisa Grove PA-C

## 2022-09-22 ENCOUNTER — OFFICE VISIT (OUTPATIENT)
Dept: FAMILY MEDICINE CLINIC | Age: 72
End: 2022-09-22
Payer: MEDICARE

## 2022-09-22 ENCOUNTER — TELEPHONE (OUTPATIENT)
Dept: FAMILY MEDICINE CLINIC | Age: 72
End: 2022-09-22

## 2022-09-22 VITALS
BODY MASS INDEX: 19.66 KG/M2 | TEMPERATURE: 97.8 F | SYSTOLIC BLOOD PRESSURE: 122 MMHG | HEART RATE: 58 BPM | WEIGHT: 118 LBS | OXYGEN SATURATION: 99 % | DIASTOLIC BLOOD PRESSURE: 70 MMHG | HEIGHT: 65 IN

## 2022-09-22 DIAGNOSIS — E78.2 MIXED HYPERLIPIDEMIA: ICD-10-CM

## 2022-09-22 DIAGNOSIS — J30.9 ALLERGIC RHINITIS, UNSPECIFIED SEASONALITY, UNSPECIFIED TRIGGER: ICD-10-CM

## 2022-09-22 DIAGNOSIS — U07.1 COVID: ICD-10-CM

## 2022-09-22 DIAGNOSIS — J44.9 CHRONIC OBSTRUCTIVE PULMONARY DISEASE, UNSPECIFIED COPD TYPE (HCC): Primary | ICD-10-CM

## 2022-09-22 DIAGNOSIS — I10 ESSENTIAL HYPERTENSION: ICD-10-CM

## 2022-09-22 DIAGNOSIS — J01.90 ACUTE SINUSITIS, RECURRENCE NOT SPECIFIED, UNSPECIFIED LOCATION: ICD-10-CM
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
## 2022-09-22 DIAGNOSIS — S80.862A INSECT BITE OF LEFT LOWER LEG, INITIAL ENCOUNTER: ICD-10-CM

## 2022-09-22 LAB
INFLUENZA A ANTIBODY: NORMAL
INFLUENZA B ANTIBODY: NORMAL
Lab: ABNORMAL
PERFORMING INSTRUMENT: ABNORMAL
QC PASS/FAIL: ABNORMAL
S PYO AG THROAT QL: NORMAL
SARS-COV-2, POC: DETECTED

## 2022-09-22 PROCEDURE — 87426 SARSCOV CORONAVIRUS AG IA: CPT | Performed by: PHYSICIAN ASSISTANT

## 2022-09-22 PROCEDURE — 87880 STREP A ASSAY W/OPTIC: CPT | Performed by: PHYSICIAN ASSISTANT

## 2022-09-22 PROCEDURE — 99213 OFFICE O/P EST LOW 20 MIN: CPT | Performed by: PHYSICIAN ASSISTANT

## 2022-09-22 PROCEDURE — 87804 INFLUENZA ASSAY W/OPTIC: CPT | Performed by: PHYSICIAN ASSISTANT

## 2022-09-22 PROCEDURE — 1123F ACP DISCUSS/DSCN MKR DOCD: CPT | Performed by: PHYSICIAN ASSISTANT

## 2022-09-22 RX ORDER — DOXYCYCLINE HYCLATE 100 MG
100 TABLET ORAL 2 TIMES DAILY
Qty: 2 TABLET | Refills: 0 | Status: SHIPPED | OUTPATIENT
Start: 2022-09-22 | End: 2022-10-02

## 2022-09-22 RX ORDER — LORATADINE 10 MG/1
10 TABLET ORAL NIGHTLY PRN
Qty: 30 TABLET | Refills: 5 | Status: SHIPPED | OUTPATIENT
Start: 2022-09-22

## 2022-09-22 RX ORDER — AZELASTINE 1 MG/ML
2 SPRAY, METERED NASAL 2 TIMES DAILY
Qty: 120 ML | Refills: 5 | Status: SHIPPED | OUTPATIENT
Start: 2022-09-22

## 2022-09-22 ASSESSMENT — ENCOUNTER SYMPTOMS
RHINORRHEA: 1
SINUS PAIN: 1
SINUS PRESSURE: 1

## 2022-09-22 NOTE — TELEPHONE ENCOUNTER
Please advise. Would you have her stop the cetrizine since you prescribed Claritin, flonase is the same as Fluticasone and the reason behind the refills are if she still needs them in the future?

## 2022-09-22 NOTE — TELEPHONE ENCOUNTER
Provider Department Appt Notes   10/10/2022 Lizzeth Mendosa MD [facility] 4 MONTHS

## 2022-09-22 NOTE — PROGRESS NOTES
Subjective  Altamease Saniya, 70 y.o. female presents today with:  Chief Complaint   Patient presents with    Sinus Problem    Insect Bite     Patient presents today with sinus problem. HPI  Patient is here with complaint of sinus congestion postnasal drip drainage runny nose for the past month worsened over the past week she also had COVID earlier in the month and has been over  10 days since her at home test  Also with sore throat and she  States overall she feels well denies body aches cough chest congestion shortness of breath  Has had no exacerbation of her COPD  Complains of bee sting left lower leg since yesterday  Review of Systems   HENT:  Positive for congestion, postnasal drip, rhinorrhea, sinus pressure and sinus pain. Allergic/Immunologic: Positive for environmental allergies. All other systems reviewed and are negative.       Past Medical History:   Diagnosis Date    A-fib ([facility] [address]     Anxiety     Asthma     Cancer ([facility] [address] 2018    squamous cell- eye brow - CC    Chronic constipation     Colon polyps     COPD (chronic obstructive pulmonary disease) (HCC)     Cyst of nipple     Endometriosis     Fibromyalgia     Goiter     Goiter     Hyperlipidemia     Hypertension     Hypothyroidism     Osteoarthritis     Thyroid ca ([facility] [address]     TMJ (dislocation of temporomandibular joint)     TMJ arthritis      Past Surgical History:   Procedure Laterality Date    COLONOSCOPY      DIAGNOSTIC CARDIAC CATH LAB PROCEDURE  04/18/2019    THYROIDECTOMY      THYROIDECTOMY Left 1/6/2015     Social History     Socioeconomic History    Marital status:      Spouse name: Not on file    Number of children: Not on file    Years of education: Not on file    Highest education level: Not on file   Occupational History    Not on file   Tobacco Use    Smoking status: Some Days     Packs/day: 1.00     Years: 45.00     Pack years: 45.00     Types: Cigarettes     Start date: 1966    Smokeless tobacco: Never    Tobacco comments: 1 tablet by mouth nightly as needed (allergies) 30 tablet 5    EQ ALLERGY RELIEF, CETIRIZINE, 10 MG tablet TAKE 1 TABLET BY MOUTH NIGHTLY AS NEEDED FOR ALLERGIES OR RHINITIS 90 tablet 1    budesonide-formoterol (SYMBICORT) 160-4.5 MCG/ACT AERO Inhale 2 puffs by mouth twice daily 11 g 5    EQ MUCUS  MG extended release tablet Take 1 tablet by mouth twice daily 60 tablet 5    dilTIAZem (CARDIZEM) 60 MG tablet Take 1 tablet by mouth twice daily 180 tablet 3    citalopram (CELEXA) 40 MG tablet Take 1 tablet by mouth once daily 90 tablet 2    conjugated estrogens (PREMARIN) 0.625 MG/GM vaginal cream Place vaginally daily. 1 each 2    pitavastatin (LIVALO) 1 MG TABS tablet Take 0.5 tablets by mouth nightly 45 tablet 3    meclofenamate (MECLOMEN) 100 MG capsule TAKE 1 CAPSULE BY MOUTH DAILY AS NEEDED 90 capsule 0    albuterol (ACCUNEB) 0.63 MG/3ML nebulizer solution Take 1 ampule by nebulization every 6 hours as needed for Wheezing      montelukast (SINGULAIR) 10 MG tablet Take 10 mg by mouth nightly      fexofenadine (ALLEGRA) 180 MG tablet Take 180 mg by mouth daily      fluticasone (FLONASE) 50 MCG/ACT nasal spray USE 1 SPRAY INTO EACH NOSTRIL ONCE DAILY 1 Bottle 2    aspirin 81 MG tablet Take 1 tablet by mouth daily With Food 30 tablet 3    albuterol sulfate  (90 Base) MCG/ACT inhaler 2 puffs every 6 hours 1 Inhaler 5    levothyroxine (SYNTHROID) 50 MCG tablet Take 50 mcg by mouth daily On Tuesday and Thursday; Saturday  she takes 2 tabs. On Monday,Wednesday,Friday she takes 2 1/2 tabs  3     No current facility-administered medications for this visit. Objective    Vitals:    09/22/22 0909   BP: 122/70   Site: Left Upper Arm   Position: Sitting   Cuff Size: Medium Adult   Pulse: 58   Temp: 97.8 °F (36.6 °C)   TempSrc: Temporal   SpO2: 99%   Weight: 118 lb (53.5 kg)   Height: 5' 5\" (1.651 m)     Physical Exam  Constitutional:       General: She is not in acute distress. Appearance: She is obese. She is not ill-appearing. HENT:      Head: Normocephalic and atraumatic. Nose: No congestion. Eyes:      Extraocular Movements: Extraocular movements intact. Conjunctiva/sclera: Conjunctivae normal.      Pupils: Pupils are equal, round, and reactive to light. Neck:      Thyroid: No thyromegaly. Cardiovascular:      Rate and Rhythm: Normal rate and regular rhythm. Heart sounds: Normal heart sounds. No murmur heard. Pulmonary:      Effort: Pulmonary effort is normal. No respiratory distress. Breath sounds: Normal breath sounds. No wheezing or rales. Abdominal:      General: Bowel sounds are normal.      Palpations: Abdomen is soft. Musculoskeletal:         General: Normal range of motion. Cervical back: Normal range of motion and neck supple. Lymphadenopathy:      Cervical: No cervical adenopathy. Skin:     General: Skin is warm and dry. Coloration: Skin is not jaundiced or pale. Comments: Scabbed lesion left calf no surrounding redness or discharge   Neurological:      General: No focal deficit present. Mental Status: She is alert and oriented to person, place, and time. Cranial Nerves: No cranial nerve deficit. Motor: No weakness. Coordination: Coordination normal.      Gait: Gait normal.   Psychiatric:         Mood and Affect: Mood normal.         Behavior: Behavior normal.         Thought Content: Thought content normal.         Judgment: Judgment normal.            Assessment & Plan    Diagnosis Orders   1. Chronic obstructive pulmonary disease, unspecified COPD type ([facility] [address]        2. Acute sinusitis, recurrence not specified, unspecified location  doxycycline hyclate (VIBRA-TABS) 100 MG tablet    POCT Influenza A/B    POCT rapid strep A    Stop Allegra and Flonase we will start Claritin and as Astelin      3. COVID  POCT COVID-19, Antigen      4.  Allergic rhinitis, unspecified seasonality, unspecified trigger  azelastine (ASTELIN) 0.1 % nasal spray    loratadine (CLARITIN) 10 MG tablet      5. Insect bite of left lower leg, initial encounter      beesting            Orders Placed This Encounter   Procedures    POCT COVID-19, Antigen     Order Specific Question:   Is this test for diagnosis or screening? Answer:   Diagnosis of ill patient     Order Specific Question:   Symptomatic for COVID-19 as defined by CDC? Answer:   Yes     Order Specific Question:   Date of Symptom Onset     Answer:   2022     Order Specific Question:   Hospitalized for COVID-19? Answer:   No     Order Specific Question:   Admitted to ICU for COVID-19? Answer:   No     Order Specific Question:   Employed in healthcare setting? Answer:   No     Order Specific Question:   Resident in a congregate (group) care setting? Answer:   No     Order Specific Question:   Pregnant? Answer:   No     Order Specific Question:   Previously tested for COVID-19? Answer:   Yes    POCT Influenza A/B    POCT rapid strep A     Orders Placed This Encounter   Medications    doxycycline hyclate (VIBRA-TABS) 100 MG tablet     Sig: Take 1 tablet by mouth 2 times daily for 10 days     Dispense:  2 tablet     Refill:  0    azelastine (ASTELIN) 0.1 % nasal spray     Si sprays by Nasal route 2 times daily Use in each nostril as directed     Dispense:  120 mL     Refill:  5    loratadine (CLARITIN) 10 MG tablet     Sig: Take 1 tablet by mouth nightly as needed (allergies)     Dispense:  30 tablet     Refill:  5     There are no discontinued medications. Return if symptoms worsen or fail to improve.     Mini Wiseman PA-C

## 2022-09-22 NOTE — TELEPHONE ENCOUNTER
As Astelin prescriptions have been sent to the pharmacy as well as antibiotic doxycycline to clear her sinus thank and Flonase we will start Claritin and stop allegra

## 2022-09-22 NOTE — TELEPHONE ENCOUNTER
Patient is requesting a call back because she is still unsure of what to take and not take. She is inquiring if she should stop the medications she was previously using. Which is Mucus ER, Cetirizine and Fluticasone and just take the medications she was prescribed today. Is also inquiring as to why two of the medications have refills.

## 2022-09-23 RX ORDER — PITAVASTATIN CALCIUM 1.04 MG/1
TABLET, FILM COATED ORAL
Qty: 15 TABLET | Refills: 3 | Status: SHIPPED | OUTPATIENT
Start: 2022-09-23 | End: 2022-10-11 | Stop reason: SDUPTHER

## 2022-10-05 ENCOUNTER — TELEPHONE (OUTPATIENT)
Dept: CARDIOLOGY CLINIC | Age: 72
End: 2022-10-05

## 2022-10-05 DIAGNOSIS — F41.1 GENERALIZED ANXIETY DISORDER: Primary | ICD-10-CM

## 2022-10-05 RX ORDER — ALPRAZOLAM 0.5 MG/1
TABLET ORAL
Qty: 30 TABLET | Refills: 1 | Status: SHIPPED | OUTPATIENT
Start: 2022-10-05 | End: 2022-11-05

## 2022-10-05 NOTE — TELEPHONE ENCOUNTER
Pt calling to get clearance for a dentist procedure on Tuesday, 10/11/22. States that she went to the dentist yesterday and needs a procedure done. Wants to know if she can get clearance to stop baby Asprin 5 days prior to her dental procedure?     [phone] # [phone]

## 2022-10-05 NOTE — TELEPHONE ENCOUNTER
LM on VM to let pt know that it was ok per Dr Simona Henry. Asked pt to call us back to make sure she received this message.

## 2022-10-10 ENCOUNTER — OFFICE VISIT (OUTPATIENT)
Dept: CARDIOLOGY CLINIC | Age: 72
End: 2022-10-10
Payer: MEDICARE

## 2022-10-10 VITALS
WEIGHT: 119 LBS | OXYGEN SATURATION: 98 % | DIASTOLIC BLOOD PRESSURE: 80 MMHG | SYSTOLIC BLOOD PRESSURE: 110 MMHG | HEART RATE: 64 BPM | BODY MASS INDEX: 19.8 KG/M2

## 2022-10-10 DIAGNOSIS — I25.10 CORONARY ARTERY DISEASE INVOLVING NATIVE CORONARY ARTERY OF NATIVE HEART WITHOUT ANGINA PECTORIS: ICD-10-CM

## 2022-10-10 DIAGNOSIS — E78.2 MIXED HYPERLIPIDEMIA: ICD-10-CM

## 2022-10-10 DIAGNOSIS — I10 ESSENTIAL HYPERTENSION: ICD-10-CM

## 2022-10-10 DIAGNOSIS — I65.23 BILATERAL CAROTID ARTERY STENOSIS: ICD-10-CM

## 2022-10-10 DIAGNOSIS — I48.91 ATRIAL FIBRILLATION, UNSPECIFIED TYPE (HCC): Primary | ICD-10-CM

## 2022-10-10 PROBLEM — M85.80 OSTEOPENIA: Status: ACTIVE | Noted: 2021-03-11

## 2022-10-10 PROCEDURE — 1123F ACP DISCUSS/DSCN MKR DOCD: CPT | Performed by: INTERNAL MEDICINE

## 2022-10-10 PROCEDURE — 99214 OFFICE O/P EST MOD 30 MIN: CPT | Performed by: INTERNAL MEDICINE

## 2022-10-10 PROCEDURE — 93000 ELECTROCARDIOGRAM COMPLETE: CPT | Performed by: INTERNAL MEDICINE

## 2022-10-10 ASSESSMENT — ENCOUNTER SYMPTOMS
EYES NEGATIVE: 1
NAUSEA: 0
SHORTNESS OF BREATH: 0
WHEEZING: 0
CHEST TIGHTNESS: 0
RESPIRATORY NEGATIVE: 1
BLOOD IN STOOL: 0
GASTROINTESTINAL NEGATIVE: 1
STRIDOR: 0
COUGH: 0

## 2022-10-10 NOTE — PROGRESS NOTES
Subsequent Progress Note  Patient: Iliana Moore  YOB: 1950  MRN: [id number]    Chief Complaint: carotid htn carotid cad  Chief Complaint   Patient presents with    Hypertension    Atrial Fibrillation    Hyperlipidemia       CV Data:  2/2017 spect negative ccf  2/2017 PVR negative ccf  1/2019 CUS extensive atherosclerosis R>L  1/19 echo ef 60   4/5/2019 CTA neck - CHERELLE 70%  LICA 34  6/51/7941 CATH LAD Mild RCA 20% EF 55 EDP 5   S/P Complete Thyroidectomy   12/2020 PVR negative  12/2020 CUS CHERELLE 50-69%   1/22 CUS B/L 50%    Subjective/HPI: no cp no sob no falls no bleed tolerating only 1/2 tab Livalo. Full tab caused severe myalgia. LDL not to goal.      1/13/2020 no cp no sob no falls no bleed f/u CT chest mild enlarged R nodule. 12/7/2020 still smokes. No cp no palp no falls no bleeed no sob    1/11/21 TELEHEALTH EVALUATION -- Audio/Visual (During [facility]- public health emergency)    No cp no sob no falls no bleed. Eats well still smokes    5/12/21 TELEHEALTH EVALUATION -- Audio/Visual (During [facility]- public health emergency)    No cp no sob no falls no bleed take smeds doing well    9/16/21 now has bronchitis. No cp no sob no falls occ hemorrhoid bleed. Takes med    1/24/22 doing well no cp no sob no bleed. No falls takes meds. PFT at [facility] - moderate COPD    5/23/22 doing well no cp no so bno falls takes meds    10/10/22 doing well no cp no sob no falls nob leed. Takes meds.      Smokes 1/2 ppd   Retired   Lives w      EKG: SR 77    Past Medical History:   Diagnosis Date    A-fib ([address]     Anxiety     Asthma     Cancer ([address] 2018    squamous cell- eye brow - CC    Chronic constipation     Colon polyps     COPD (chronic obstructive pulmonary disease) (HCC)     Cyst of nipple     Endometriosis     Fibromyalgia     Goiter     Goiter     Hyperlipidemia     Hypertension     Hypothyroidism     Osteoarthritis     Thyroid ca ([address]     TMJ (dislocation of temporomandibular joint) Current Outpatient Medications   Medication Sig Dispense Refill    Cholecalciferol 50 MCG (2000 UT) CAPS       ALPRAZolam (XANAX) 0.5 MG tablet Take 1 tablet by mouth once daily 30 tablet 1    LIVALO 1 MG TABS tablet TAKE 1/2 (ONE-HALF) TABLET BY MOUTH NIGHTLY 15 tablet 3    azelastine (ASTELIN) 0.1 % nasal spray 2 sprays by Nasal route 2 times daily Use in each nostril as directed 120 mL 5    loratadine (CLARITIN) 10 MG tablet Take 1 tablet by mouth nightly as needed (allergies) 30 tablet 5    budesonide-formoterol (SYMBICORT) 160-4.5 MCG/ACT AERO Inhale 2 puffs by mouth twice daily 11 g 5    EQ MUCUS  MG extended release tablet Take 1 tablet by mouth twice daily 60 tablet 5    dilTIAZem (CARDIZEM) 60 MG tablet Take 1 tablet by mouth twice daily 180 tablet 3    citalopram (CELEXA) 40 MG tablet Take 1 tablet by mouth once daily 90 tablet 2    conjugated estrogens (PREMARIN) 0.625 MG/GM vaginal cream Place vaginally daily. 1 each 2    meclofenamate (MECLOMEN) 100 MG capsule TAKE 1 CAPSULE BY MOUTH DAILY AS NEEDED 90 capsule 0    albuterol (ACCUNEB) 0.63 MG/3ML nebulizer solution Take 1 ampule by nebulization every 6 hours as needed for Wheezing      montelukast (SINGULAIR) 10 MG tablet Take 10 mg by mouth nightly      fluticasone (FLONASE) 50 MCG/ACT nasal spray USE 1 SPRAY INTO EACH NOSTRIL ONCE DAILY 1 Bottle 2    aspirin 81 MG tablet Take 1 tablet by mouth daily With Food 30 tablet 3    albuterol sulfate  (90 Base) MCG/ACT inhaler 2 puffs every 6 hours 1 Inhaler 5    levothyroxine (SYNTHROID) 50 MCG tablet Take 50 mcg by mouth daily On Tuesday and Thursday; Saturday  she takes 2 tabs. On Monday,Wednesday,Friday she takes 2 1/2 tabs  3     No current facility-administered medications for this visit. Review of Systems:   Review of Systems   Constitutional: Negative. Negative for diaphoresis and fatigue. HENT: Negative. Eyes: Negative. Respiratory: Negative.   Negative for cough, chest tightness, shortness of breath, wheezing and stridor. Cardiovascular: Negative. Negative for chest pain, palpitations and leg swelling. Gastrointestinal: Negative. Negative for blood in stool and nausea. Genitourinary: Negative. Musculoskeletal: Negative. Skin: Negative. Neurological: Negative. Negative for dizziness, syncope, weakness and light-headedness. Hematological: Negative. Psychiatric/Behavioral: Negative. Physical Examination:    /80 (Site: Left Upper Arm, Position: Sitting, Cuff Size: Medium Adult)   Pulse 64   Wt 119 lb (54 kg)   LMP 01/09/1995   SpO2 98%   BMI 19.80 kg/m²    Physical Exam   Constitutional: She appears healthy. No distress. HENT:   Normal cephalic and Atraumatic   Eyes: Pupils are equal, round, and reactive to light. Neck: Thyroid normal. No JVD present. No neck adenopathy. No thyromegaly present. Cardiovascular: Normal rate, regular rhythm and intact distal pulses. Murmur heard. Pulses:       Carotid pulses are  on the right side with bruit and  on the left side with bruit. Dorsalis pedis pulses are 1+ on the right side and 0 on the left side. Posterior tibial pulses are 0 on the right side and 2+ on the left side. Pulmonary/Chest: Effort normal. She has no rales. She has diffuse wheezes. She exhibits no tenderness. Abdominal: Soft. Bowel sounds are normal. There is no abdominal tenderness. Musculoskeletal:         General: No tenderness or edema. Normal range of motion. Cervical back: Normal range of motion and neck supple. Neurological: She is alert and oriented to person, place, and time. Skin: Skin is warm. No cyanosis. Nails show no clubbing.      LABS:  CBC:   Lab Results   Component Value Date/Time    WBC 4.2 05/24/2022 09:59 AM    RBC 4.55 05/24/2022 09:59 AM    HGB 13.9 05/24/2022 09:59 AM    HCT 42.2 05/24/2022 09:59 AM    MCV 92.7 05/24/2022 09:59 AM    MCH 30.6 05/24/2022 09:59 AM    MCHC 33.1 05/24/2022 09:59 AM    RDW 13.8 05/24/2022 09:59 AM     05/24/2022 09:59 AM    MPV 9.6 10/09/2014 02:45 PM     Lipids:  Lab Results   Component Value Date    CHOL 138 11/13/2019    CHOL 219 (H) 05/14/2019    CHOL 256 (H) 02/05/2019     Lab Results   Component Value Date    TRIG 160 (H) 11/13/2019    TRIG 104 05/14/2019    TRIG 116 02/05/2019     Lab Results   Component Value Date    HDL 77 (H) 05/24/2022    HDL 76 (H) 12/24/2020    HDL 73 (H) 11/13/2019     Lab Results   Component Value Date    LDLCALC 115 05/24/2022    LDLCALC 94 12/24/2020    LDLCALC 33 11/13/2019     No results found for: LABVLDL, VLDL  No results found for: CHOLHDLRATIO  CMP:    Lab Results   Component Value Date/Time     05/24/2022 09:59 AM    K 4.2 05/24/2022 09:59 AM     05/24/2022 09:59 AM    CO2 26 05/24/2022 09:59 AM    BUN 12 05/24/2022 09:59 AM    CREATININE 0.68 05/24/2022 09:59 AM    GFRAA >60.0 05/24/2022 09:59 AM    LABGLOM >60.0 05/24/2022 09:59 AM    GLUCOSE 91 05/24/2022 09:59 AM    PROT 6.4 05/24/2022 09:59 AM    LABALBU 4.2 05/24/2022 09:59 AM    CALCIUM 9.1 05/24/2022 09:59 AM    BILITOT 0.3 05/24/2022 09:59 AM    ALKPHOS 100 05/24/2022 09:59 AM    AST 19 05/24/2022 09:59 AM    ALT 14 05/24/2022 09:59 AM     BMP:    Lab Results   Component Value Date/Time     05/24/2022 09:59 AM    K 4.2 05/24/2022 09:59 AM     05/24/2022 09:59 AM    CO2 26 05/24/2022 09:59 AM    BUN 12 05/24/2022 09:59 AM    LABALBU 4.2 05/24/2022 09:59 AM    CREATININE 0.68 05/24/2022 09:59 AM    CALCIUM 9.1 05/24/2022 09:59 AM    GFRAA >60.0 05/24/2022 09:59 AM    LABGLOM >60.0 05/24/2022 09:59 AM    GLUCOSE 91 05/24/2022 09:59 AM     Magnesium:    Lab Results   Component Value Date/Time    MG 2.1 05/24/2022 09:59 AM     TSH:  Lab Results   Component Value Date    TSH 1.160 05/24/2022       Patient Active Problem List   Diagnosis    Goiter    COPD (chronic obstructive pulmonary disease) (HCC)    GERD (gastroesophageal reflux disease)    Mixed hyperlipidemia    Hypothyroidism    Generalized anxiety disorder    Tobacco abuse    Intra-abdominal hematoma    Chest pain    Bilateral carotid bruits    Essential hypertension    Atrial fibrillation (HCC)    Coronary artery disease involving native coronary artery of native heart without angina pectoris    Hyperlipidemia    Bilateral carotid artery stenosis    Simple chronic bronchitis (HCC)    Angina effort    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 Assessment/Plan:    1. Essential hypertension  Stable - continue meds. Low salt diet      2. Coronary artery disease involving native coronary artery of native heart without angina pectoris  No angina - continue meds    3. Carotid - mild - CUS. Will continue surveillance    4. R Lung nodule- repeat CT chesst scheduled for 6 mo. If worse at tthat time [facility] eval - stable in f/u. - now seeing [facility] Lung Doctor. 5.Claudication Cold feet - PVR - stable     Fasting labs now. Pt ok for dental procedure. She also getting bags under eye removed     Counseling:  Heart Healthy Lifestyle, Stop Smoking, Low Salt Diet, Take Precautions to Prevent Falls, Regular Exercise and Walk Daily    Return in about 4 months (around 2/10/2023).       Electronically signed by Blade Trejo MD on 10/10/2022 at 1:51 PM

## 2022-10-11 DIAGNOSIS — I10 ESSENTIAL HYPERTENSION: ICD-10-CM

## 2022-10-11 DIAGNOSIS — E78.2 MIXED HYPERLIPIDEMIA: ICD-10-CM

## 2022-10-11 RX ORDER — PITAVASTATIN CALCIUM 1.04 MG/1
TABLET, FILM COATED ORAL
Qty: 45 TABLET | Refills: 1 | Status: SHIPPED | OUTPATIENT
Start: 2022-10-11

## 2022-10-11 NOTE — TELEPHONE ENCOUNTER
Please approve or deny this refill request. The order is pended. Thank you.     LOV 10/10/2022    Next Visit Date:  Future Appointments   Date Time Provider Lashonda Sparkle   4/10/2023  1:45 PM Jaison Richard MD [address] [place] [address]   7/17/2023 11:00 AM Mini Wiseman PA-C [facility]         VERBAL ORDER RECEIVED WITH READ BACK

## 2022-10-31 NOTE — PROGRESS NOTES
[facility] Cardiology Discharge Summary     Patient ID:  Sejal Ron  [id number]  18 y.o.  1959    Admit date: 10/27/2022    Discharge date:  10/31/22    Admitting Physician: Jordan Doshi MD     Discharge Physician: TOY Lester, PA/Dr. Dwayne Novak    Admission Diagnoses: Paroxysmal atrial fibrillation [facility]) [I48.0]  NSTEMI (non-ST elevated myocardial infarction) [facility]) [I21.4]  Atrial fibrillation with RVR [facility]) [I48.91]    Discharge Diagnoses:   Patient Active Problem List    Diagnosis    Tobacco abuse    Inferior myocardial infarction [facility])    Atrial fibrillation with RVR ([facility]    Coronary artery disease involving native coronary artery of native heart    Ischemic cardiomyopathy    Acute systolic congestive heart failure ([facility]    NSTEMI (non-ST elevated myocardial infarction) [facility])      transitional care follow up with [facility] Cardiology Dr Isaías Patel Nov 9 at [address] [place] [address] office    Cardiology Procedures this admission:  Left heart catheterization with PCI, echo, ROSALINDA/eCV   Consults:     Hospital Course: Patient presented to the emergency department of [facility] Years of education: Not on file    Highest education level: Not on file   Occupational History    Not on file   Social Needs    Financial resource strain: Not on file    Food insecurity:     Worry: Not on file     Inability: Not on file    Transportation needs:     Medical: Not on file     Non-medical: Not on file   Tobacco Use    Smoking status: Current Some Day Smoker     Packs/day: 0.25     Years: 30.00     Pack years: 7.50     Types: Cigarettes    Smokeless tobacco: Never Used    Tobacco comment: She smokes 1 cigarette twice a day   Substance and Sexual Activity    Alcohol use:  Yes    Drug use: No    Sexual activity: Yes     Partners: Male   Lifestyle    Physical activity:     Days per week: Not on file     Minutes per session: Not on file    Stress: Not on file   Relationships    Social connections:     Talks on phone: Not on file     Gets together: Not on file     Attends Buddhism service: Not on file     Active member of club or organization: Not on file     Attends meetings of clubs or organizations: Not on file     Relationship status: Not on file    Intimate partner violence:     Fear of current or ex partner: Not on file     Emotionally abused: Not on file     Physically abused: Not on file     Forced sexual activity: Not on file   Other Topics Concern    Not on file   Social History Narrative    Not on file     Family History   Problem Relation Age of Onset    Heart Disease Mother 61        CABG    Cancer Mother [de-identified]        lung    High Blood Pressure Mother     Heart Disease Father 48    High Blood Pressure Maternal Grandmother     High Blood Pressure Paternal Grandmother     Heart Attack Neg Hx     Hypertension Neg Hx     Diabetes Neg Hx         Allergies   Allergen Reactions    Morphine      Tremor    Cefdinir Itching and Other (See Comments)    Fosamax [Alendronate] Other (See Comments)     Muscle aches      Pantoprazole Other (See Comments)     abdominal cramping, [facility] Cardiology Dr Cecile Baltazar Nov 9 at [address] [place] [address] [facility] at [phone]. atraumatic. Right Ear: A middle ear effusion is present. Left Ear: A middle ear effusion is present. Nose: Mucosal edema and sinus tenderness present. Mouth/Throat: Oropharynx is clear and moist.   Eyes: Pupils are equal, round, and reactive to light. Conjunctivae and EOM are normal.   Neck: Normal range of motion. Neck supple. Cardiovascular: Normal rate, regular rhythm and normal heart sounds. Pulmonary/Chest: Effort normal and breath sounds normal.   Neurological: She is alert and oriented to person, place, and time. Skin: Skin is warm and dry. Psychiatric: She has a normal mood and affect. Assessment & Plan    Diagnosis Orders   1. Acute non-recurrent maxillary sinusitis  doxycycline hyclate (VIBRA-TABS) 100 MG tablet    predniSONE (DELTASONE) 20 MG tablet   2. Simple chronic bronchitis ([address]           No orders of the defined types were placed in this encounter. Orders Placed This Encounter   Medications    doxycycline hyclate (VIBRA-TABS) 100 MG tablet     Sig: Take 1 tablet by mouth 2 times daily for 10 days     Dispense:  20 tablet     Refill:  0    fluticasone (FLONASE) 50 MCG/ACT nasal spray     Si spray by Each Nare route daily 1 Spray in each nostril     Dispense:  1 Bottle     Refill:  1    predniSONE (DELTASONE) 20 MG tablet     Sig: Take 1 tablet by mouth 2 times daily for 5 days     Dispense:  10 tablet     Refill:  0     Medications Discontinued During This Encounter   Medication Reason    doxycycline hyclate (VIBRA-TABS) 100 MG tablet REORDER    predniSONE (DELTASONE) 20 MG tablet REORDER     Return if symptoms worsen or fail to improve.     Mini Wiseman PA-C 

## 2022-11-27 DIAGNOSIS — J44.1 COPD WITH ACUTE EXACERBATION (HCC): ICD-10-CM

## 2022-11-28 RX ORDER — GUAIFENESIN 600 MG/1
TABLET, EXTENDED RELEASE ORAL
Qty: 60 TABLET | Refills: 3 | Status: SHIPPED | OUTPATIENT
Start: 2022-11-28

## 2022-11-28 NOTE — TELEPHONE ENCOUNTER
Rx requested:  Requested Prescriptions     Pending Prescriptions Disp Refills    EQ MUCUS  MG extended release tablet [Pharmacy Med Name: EQ Mucus  MG Oral Tablet Extended Release 12 Hour] 60 tablet 0     Sig: Take 1 tablet by mouth twice daily         Last Office Visit:   9/22/2022      Next Visit Date:  Future Appointments   Date Time Provider Lashonda Villagran   4/10/2023  1:45 PM Ford Macias MD [address] [place] [address]   7/17/2023 11:00 AM Mini Wiseman PA-C [address] [place] [address]

## 2022-12-02 DIAGNOSIS — F41.1 GENERALIZED ANXIETY DISORDER: ICD-10-CM

## 2022-12-04 RX ORDER — ALPRAZOLAM 0.5 MG/1
TABLET ORAL
Qty: 30 TABLET | Refills: 1 | Status: SHIPPED | OUTPATIENT
Start: 2022-12-04 | End: 2023-02-02 | Stop reason: SDUPTHER

## 2022-12-04 NOTE — TELEPHONE ENCOUNTER
Pharmacy requesting medication refill.  Please approve or deny this request.    Rx requested:  Requested Prescriptions     Pending Prescriptions Disp Refills    ALPRAZolam (XANAX) 0.5 MG tablet [Pharmacy Med Name: ALPRAZolam 0.5 MG Oral Tablet] 30 tablet 0     Sig: Take 1 tablet by mouth once daily         Last Office Visit:   9/22/2022      Next Visit Date:  Future Appointments   Date Time Provider Lashonda Villagran   4/10/2023  1:45 PM Sara Aburto MD [facility]   7/17/2023 11:00 AM Mini Wiseman PA-C [address]

## 2022-12-15 RX ORDER — CITALOPRAM 40 MG/1
TABLET ORAL
Qty: 90 TABLET | Refills: 2 | Status: SHIPPED | OUTPATIENT
Start: 2022-12-15

## 2022-12-15 NOTE — TELEPHONE ENCOUNTER
Pharmacy requesting medication refill.  Please approve or deny this request.    Rx requested:  Requested Prescriptions     Pending Prescriptions Disp Refills    citalopram (CELEXA) 40 MG tablet [Pharmacy Med Name: Citalopram Hydrobromide 40 MG Oral Tablet] 90 tablet 0     Sig: Take 1 tablet by mouth once daily         Last Office Visit:   9/22/2022      Next Visit Date:  Future Appointments   Date Time Provider Lashonda Villagran   4/10/2023  1:45 PM Lizzeth Mendosa MD [address] [place] [address]   7/17/2023 11:00 AM Mini Wiseman PA-C [address] [address]

## 2023-01-30 DIAGNOSIS — F41.1 GENERALIZED ANXIETY DISORDER: ICD-10-CM

## 2023-01-30 RX ORDER — ALPRAZOLAM 0.5 MG/1
TABLET ORAL
Qty: 30 TABLET | Refills: 0 | OUTPATIENT
Start: 2023-01-30

## 2023-02-02 ENCOUNTER — TELEPHONE (OUTPATIENT)
Dept: FAMILY MEDICINE CLINIC | Age: 73
End: 2023-02-02

## 2023-02-02 DIAGNOSIS — F41.1 GENERALIZED ANXIETY DISORDER: ICD-10-CM

## 2023-02-02 RX ORDER — ALPRAZOLAM 0.5 MG/1
TABLET ORAL
Qty: 30 TABLET | Refills: 1 | Status: CANCELLED | OUTPATIENT
Start: 2023-02-02 | End: 2023-03-05

## 2023-02-02 RX ORDER — ALPRAZOLAM 0.5 MG/1
TABLET ORAL
Qty: 30 TABLET | Refills: 1 | Status: SHIPPED | OUTPATIENT
Start: 2023-02-02 | End: 2023-03-05

## 2023-02-02 NOTE — TELEPHONE ENCOUNTER
Patient called and stated that the prescription for Xanax was denied because she needed to schedule an appointment. She made an appointment on 2/9 and wants to know if she can have enough of a refill until appointment?

## 2023-02-09 ENCOUNTER — OFFICE VISIT (OUTPATIENT)
Dept: FAMILY MEDICINE CLINIC | Age: 73
End: 2023-02-09

## 2023-02-09 VITALS
TEMPERATURE: 97.6 F | BODY MASS INDEX: 19.83 KG/M2 | DIASTOLIC BLOOD PRESSURE: 70 MMHG | SYSTOLIC BLOOD PRESSURE: 118 MMHG | HEART RATE: 65 BPM | OXYGEN SATURATION: 96 % | HEIGHT: 65 IN | WEIGHT: 119 LBS

## 2023-02-09 DIAGNOSIS — F41.9 ANXIETY: ICD-10-CM

## 2023-02-09 DIAGNOSIS — E03.4 HYPOTHYROIDISM DUE TO ACQUIRED ATROPHY OF THYROID: ICD-10-CM

## 2023-02-09 DIAGNOSIS — Z12.31 SCREENING MAMMOGRAM, ENCOUNTER FOR: ICD-10-CM

## 2023-02-09 DIAGNOSIS — I73.9 CLAUDICATION (HCC): ICD-10-CM

## 2023-02-09 DIAGNOSIS — I20.8 EXERCISE-INDUCED ANGINA (HCC): ICD-10-CM

## 2023-02-09 DIAGNOSIS — I48.91 ATRIAL FIBRILLATION, UNSPECIFIED TYPE (HCC): ICD-10-CM

## 2023-02-09 DIAGNOSIS — R35.0 URINARY FREQUENCY: ICD-10-CM

## 2023-02-09 DIAGNOSIS — N39.3 SUI (STRESS URINARY INCONTINENCE, FEMALE): ICD-10-CM

## 2023-02-09 DIAGNOSIS — J44.1 COPD WITH ACUTE EXACERBATION (HCC): Primary | ICD-10-CM

## 2023-02-09 DIAGNOSIS — I10 ESSENTIAL HYPERTENSION: ICD-10-CM

## 2023-02-09 DIAGNOSIS — E78.2 MIXED HYPERLIPIDEMIA: ICD-10-CM

## 2023-02-09 LAB
BILIRUBIN, POC: NORMAL
BLOOD URINE, POC: NORMAL
CLARITY, POC: YELLOW
COLOR, POC: NORMAL
GLUCOSE URINE, POC: NORMAL
KETONES, POC: NORMAL
LEUKOCYTE EST, POC: NORMAL
NITRITE, POC: NORMAL
PH, POC: 6
PROTEIN, POC: NORMAL
SPECIFIC GRAVITY, POC: 1.01
UROBILINOGEN, POC: NORMAL

## 2023-02-09 SDOH — ECONOMIC STABILITY: FOOD INSECURITY: WITHIN THE PAST 12 MONTHS, YOU WORRIED THAT YOUR FOOD WOULD RUN OUT BEFORE YOU GOT MONEY TO BUY MORE.: NEVER TRUE

## 2023-02-09 SDOH — ECONOMIC STABILITY: INCOME INSECURITY: HOW HARD IS IT FOR YOU TO PAY FOR THE VERY BASICS LIKE FOOD, HOUSING, MEDICAL CARE, AND HEATING?: NOT HARD AT ALL

## 2023-02-09 SDOH — ECONOMIC STABILITY: HOUSING INSECURITY
IN THE LAST 12 MONTHS, WAS THERE A TIME WHEN YOU DID NOT HAVE A STEADY PLACE TO SLEEP OR SLEPT IN A SHELTER (INCLUDING NOW)?: NO

## 2023-02-09 SDOH — ECONOMIC STABILITY: FOOD INSECURITY: WITHIN THE PAST 12 MONTHS, THE FOOD YOU BOUGHT JUST DIDN'T LAST AND YOU DIDN'T HAVE MONEY TO GET MORE.: NEVER TRUE

## 2023-02-09 ASSESSMENT — PATIENT HEALTH QUESTIONNAIRE - PHQ9
SUM OF ALL RESPONSES TO PHQ QUESTIONS 1-9: 0
SUM OF ALL RESPONSES TO PHQ QUESTIONS 1-9: 0
1. LITTLE INTEREST OR PLEASURE IN DOING THINGS: 0
SUM OF ALL RESPONSES TO PHQ QUESTIONS 1-9: 0
2. FEELING DOWN, DEPRESSED OR HOPELESS: 0
SUM OF ALL RESPONSES TO PHQ QUESTIONS 1-9: 0
SUM OF ALL RESPONSES TO PHQ9 QUESTIONS 1 & 2: 0

## 2023-02-09 NOTE — PROGRESS NOTES
Subjective  Claire Ion Cutlip, 67 y.o. female presents today with:  Chief Complaint   Patient presents with    Medication Refill     Patient presents today for medication f/up. Patient has other concerns that she would like to discuss. HPI  Pt is here for f.u on chronic concerns  Followed by cc for hypothyroidism, who will also refer her for repeat bone density  Lung nodules/ copd- follwed by by cc denies exacerbation  A-fib angina-denies chest pain palpitation  Anxiety-reports a lot of stress caring for her  in her adult daughter with mental health challenges  Claudication denies symptoms  Complains of urinary frequency and incontinence denies dysuria concern for UTI  Review of Systems   All other systems reviewed and are negative.       Past Medical History:   Diagnosis Date    A-fib ([address]     Anxiety     Asthma     Cancer ([facility] 2018    squamous cell- eye brow - CC    Chronic constipation     Colon polyps     COPD (chronic obstructive pulmonary disease) (HCC)     Cyst of nipple     Endometriosis     Fibromyalgia     Goiter     Goiter     Hyperlipidemia     Hypertension     Hypothyroidism     Osteoarthritis     Thyroid ca ([facility]     TMJ (dislocation of temporomandibular joint)     TMJ arthritis      Past Surgical History:   Procedure Laterality Date    COLONOSCOPY      DIAGNOSTIC CARDIAC CATH LAB PROCEDURE  04/18/2019    THYROIDECTOMY      THYROIDECTOMY Left 1/6/2015     Social History     Socioeconomic History    Marital status:      Spouse name: Not on file    Number of children: Not on file    Years of education: Not on file    Highest education level: Not on file   Occupational History    Not on file   Tobacco Use    Smoking status: Some Days     Packs/day: 1.00     Years: 45.00     Pack years: 45.00     Types: Cigarettes     Start date: 1966    Smokeless tobacco: Never    Tobacco comments:     9/23/19 now smoking 1 cig twice per day, had quit for 7 years at one point   Substance and Sexual Activity    Alcohol use: Yes    Drug use: No    Sexual activity: Yes     Partners: Male   Other Topics Concern    Not on file   Social History Narrative    Not on file     Social Determinants of Health     Financial Resource Strain: Low Risk     Difficulty of Paying Living Expenses: Not hard at all   Food Insecurity: No Food Insecurity    Worried About [address] in the Last Year: Never true    [address] in the Last Year: Never true   Transportation Needs: Unknown    Lack of Transportation (Medical): Not on file    Lack of Transportation (Non-Medical): No   Physical Activity: Sufficiently Active    Days of Exercise per Week: 7 days    Minutes of Exercise per Session: 60 min   Stress: Not on file   Social Connections: Not on file   Intimate Partner Violence: Not on file   Housing Stability: Unknown    Unable to Pay for Housing in the Last Year: Not on file    Number of Places Lived in the Last Year: Not on file    Unstable Housing in the Last Year: No     Family History   Problem Relation Age of Onset    Heart Disease Mother 61        CABG    Cancer Mother [de-identified]        lung    High Blood Pressure Mother     Heart Disease Father 48    High Blood Pressure Maternal Grandmother     High Blood Pressure Paternal Grandmother     Heart Attack Neg Hx     Hypertension Neg Hx     Diabetes Neg Hx     Breast Cancer Neg Hx         Allergies   Allergen Reactions    Adhesive Tape Rash    Morphine      Tremor    Amoxil [Amoxicillin]      diarrhea    Cefdinir Itching and Other (See Comments)    Fosamax [Alendronate] Other (See Comments)     Muscle aches      Pantoprazole Other (See Comments)     abdominal cramping, uncontrolled BM. Simvastatin      muscle soreness.     Zetia [Ezetimibe]     Zocor [Simvastatin - High Dose]      Current Outpatient Medications   Medication Sig Dispense Refill    ALPRAZolam (XANAX) 0.5 MG tablet Take 1 tablet by mouth once daily 30 tablet 1    citalopram (CELEXA) 40 MG tablet Take 1 tablet by mouth once daily 90 tablet 2    EQ MUCUS  MG extended release tablet Take 1 tablet by mouth twice daily 60 tablet 3    pitavastatin (LIVALO) 1 MG TABS tablet TAKE 1/2 (ONE-HALF) TABLET BY MOUTH NIGHTLY 45 tablet 1    Cholecalciferol 50 MCG (2000 UT) CAPS       azelastine (ASTELIN) 0.1 % nasal spray 2 sprays by Nasal route 2 times daily Use in each nostril as directed 120 mL 5    loratadine (CLARITIN) 10 MG tablet Take 1 tablet by mouth nightly as needed (allergies) 30 tablet 5    budesonide-formoterol (SYMBICORT) 160-4.5 MCG/ACT AERO Inhale 2 puffs by mouth twice daily 11 g 5    dilTIAZem (CARDIZEM) 60 MG tablet Take 1 tablet by mouth twice daily 180 tablet 3    conjugated estrogens (PREMARIN) 0.625 MG/GM vaginal cream Place vaginally daily. 1 each 2    meclofenamate (MECLOMEN) 100 MG capsule TAKE 1 CAPSULE BY MOUTH DAILY AS NEEDED 90 capsule 0    albuterol (ACCUNEB) 0.63 MG/3ML nebulizer solution Take 1 ampule by nebulization every 6 hours as needed for Wheezing      montelukast (SINGULAIR) 10 MG tablet Take 10 mg by mouth nightly      fluticasone (FLONASE) 50 MCG/ACT nasal spray USE 1 SPRAY INTO EACH NOSTRIL ONCE DAILY 1 Bottle 2    aspirin 81 MG tablet Take 1 tablet by mouth daily With Food 30 tablet 3    albuterol sulfate  (90 Base) MCG/ACT inhaler 2 puffs every 6 hours 1 Inhaler 5    levothyroxine (SYNTHROID) 50 MCG tablet Take 50 mcg by mouth daily On Tuesday and Thursday; Saturday  she takes 2 tabs. On Monday,Wednesday,Friday she takes 2 1/2 tabs  3     No current facility-administered medications for this visit. Objective    Vitals:    02/09/23 0957   BP: 118/70   Site: Right Upper Arm   Position: Sitting   Cuff Size: Medium Adult   Pulse: 65   Temp: 97.6 °F (36.4 °C)   TempSrc: Temporal   SpO2: 96%   Weight: 119 lb (54 kg)   Height: 5' 5\" (1.651 m)     Physical Exam  Constitutional:       General: She is not in acute distress. Appearance: Normal appearance.  She is not ill-appearing. HENT:      Head: Normocephalic. Eyes:      Extraocular Movements: Extraocular movements intact. Conjunctiva/sclera: Conjunctivae normal.      Pupils: Pupils are equal, round, and reactive to light. Neck:      Thyroid: No thyromegaly. Cardiovascular:      Rate and Rhythm: Normal rate and regular rhythm. Heart sounds: Normal heart sounds. No murmur heard. Pulmonary:      Effort: Pulmonary effort is normal. No respiratory distress. Breath sounds: Normal breath sounds. No wheezing, rhonchi or rales. Abdominal:      General: Bowel sounds are normal.      Palpations: Abdomen is soft. There is no mass. Tenderness: There is no abdominal tenderness. There is no guarding. Musculoskeletal:         General: Normal range of motion. Cervical back: Normal range of motion and neck supple. Lymphadenopathy:      Cervical: No cervical adenopathy. Skin:     General: Skin is warm and dry. Coloration: Skin is not jaundiced or pale. Neurological:      General: No focal deficit present. Mental Status: She is alert and oriented to person, place, and time. Cranial Nerves: No cranial nerve deficit. Motor: No weakness. Coordination: Coordination normal.      Gait: Gait normal.   Psychiatric:         Mood and Affect: Mood is anxious. Behavior: Behavior normal.         Thought Content: Thought content normal.         Judgment: Judgment normal.            Assessment & Plan    Diagnosis Orders   1. COPD with acute exacerbation ([address]        2. Exercise-induced angina (HCC)  Lipid, Fasting      3. Claudication ([address]        4. Atrial fibrillation, unspecified type ([address]  Comprehensive Metabolic Panel      5. Essential hypertension  Comprehensive Metabolic Panel    Lipid, Fasting    Controlled      6. Anxiety  Pain Management Drug Screen      7. Mixed hyperlipidemia  Lipid, Fasting      8. Hypothyroidism due to acquired atrophy of thyroid        9.  Urinary frequency  Urinalysis with Reflex to Culture    Ambulatory referral to Obstetrics / Gynecology    POCT Urinalysis No Micro (Auto)      10. ASHLEY (stress urinary incontinence, female)  Ambulatory referral to Obstetrics / Gynecology      11. Screening mammogram, encounter for  SAADIA DIGITAL SCREEN W OR WO CAD BILATERAL            Orders Placed This Encounter   Procedures    SAADIA DIGITAL SCREEN W OR WO CAD BILATERAL     Standing Status:   Future     Standing Expiration Date:   4/9/2024    Comprehensive Metabolic Panel     Standing Status:   Future     Standing Expiration Date:   2/9/2024    Lipid, Fasting     Standing Status:   Future     Standing Expiration Date:   2/9/2024    Pain Management Drug Screen     Standing Status:   Future     Number of Occurrences:   1     Standing Expiration Date:   2/9/2024    Urinalysis with Reflex to Culture     Standing Status:   Future     Number of Occurrences:   1     Standing Expiration Date:   2/9/2024     Order Specific Question:   SPECIFY(EX-CATH,MIDSTREAM,CYSTO,ETC)? Answer:   mid stream    Ambulatory referral to Obstetrics / Gynecology     Referral Priority:   Routine     Referral Type:   Eval and Treat     Referral Reason:   Specialty Services Required     Referred to Provider:   Christian Bravo MD     Requested Specialty:   Obstetrics & Gynecology     Number of Visits Requested:   1    POCT Urinalysis No Micro (Auto)     No orders of the defined types were placed in this encounter. There are no discontinued medications. Return in about 6 months (around 8/9/2023), or if symptoms worsen or fail to improve, for follow up on response to treatment.     Mini Wiseman PA-C

## 2023-03-01 DIAGNOSIS — J30.9 ALLERGIC RHINITIS, UNSPECIFIED SEASONALITY, UNSPECIFIED TRIGGER: ICD-10-CM

## 2023-03-01 RX ORDER — BENZOYL PEROXIDE
KIT TOPICAL
Qty: 30 TABLET | Refills: 5 | Status: SHIPPED | OUTPATIENT
Start: 2023-03-01

## 2023-03-14 ENCOUNTER — OFFICE VISIT (OUTPATIENT)
Dept: OBGYN CLINIC | Age: 73
End: 2023-03-14
Payer: MEDICARE

## 2023-03-14 VITALS
BODY MASS INDEX: 19.83 KG/M2 | SYSTOLIC BLOOD PRESSURE: 122 MMHG | DIASTOLIC BLOOD PRESSURE: 76 MMHG | HEART RATE: 72 BPM | WEIGHT: 119 LBS | HEIGHT: 65 IN

## 2023-03-14 DIAGNOSIS — N39.41 URGENCY INCONTINENCE: ICD-10-CM

## 2023-03-14 DIAGNOSIS — N95.2 ATROPHIC VAGINITIS: ICD-10-CM

## 2023-03-14 DIAGNOSIS — N39.41 URGENCY INCONTINENCE: Primary | ICD-10-CM

## 2023-03-14 DIAGNOSIS — N94.10 DYSPAREUNIA, FEMALE: ICD-10-CM

## 2023-03-14 LAB
BILIRUBIN URINE: NEGATIVE
BLOOD, URINE: NEGATIVE
CLARITY: CLEAR
COLOR: YELLOW
GLUCOSE URINE: NEGATIVE MG/DL
KETONES, URINE: NEGATIVE MG/DL
LEUKOCYTE ESTERASE, URINE: NEGATIVE
NITRITE, URINE: NEGATIVE
PH UA: 6.5 (ref 5–9)
PROTEIN UA: NEGATIVE MG/DL
SPECIFIC GRAVITY UA: 1 (ref 1–1.03)
UROBILINOGEN, URINE: 0.2 E.U./DL

## 2023-03-14 PROCEDURE — 3074F SYST BP LT 130 MM HG: CPT | Performed by: OBSTETRICS & GYNECOLOGY

## 2023-03-14 PROCEDURE — 3078F DIAST BP <80 MM HG: CPT | Performed by: OBSTETRICS & GYNECOLOGY

## 2023-03-14 PROCEDURE — 99203 OFFICE O/P NEW LOW 30 MIN: CPT | Performed by: OBSTETRICS & GYNECOLOGY

## 2023-03-14 PROCEDURE — 1123F ACP DISCUSS/DSCN MKR DOCD: CPT | Performed by: OBSTETRICS & GYNECOLOGY

## 2023-03-14 RX ORDER — ESTRADIOL 0.1 MG/G
CREAM VAGINAL
Qty: 1 EACH | Refills: 3 | Status: SHIPPED | OUTPATIENT
Start: 2023-03-14

## 2023-03-14 RX ORDER — ALPRAZOLAM 0.5 MG/1
0.5 TABLET ORAL PRN
COMMUNITY

## 2023-03-14 NOTE — PROGRESS NOTES
Yousif Blood is a 67 y.o. female who presents here today for complaints of Bladder Problem (Urinary frequency, stress incontinence. Referred by Mini Wiseman. )    Hx of urgency, frequency every 2 hrs, incontinence, nocturia 3-4 times , worsening. Hx of anticholinergic used in past , possible oxybutynin , patient cannot recall that she had to stop due to side effects and did not follow up back then . Symptoms worsened affecting daily activity , wearing large pads daily , worsened by vaginal pain with dyspareunia. Patient was treated with vaginal Premarin cream , but patient mentions she used as needed and not consistently. Reports  had side effects with tongue parasthesia due to oral sex with the estrogen use , and connot confirm if due to the estrogen , so she stopped back then,.      .      Vitals:  /76 (Site: Left Upper Arm, Position: Sitting, Cuff Size: Small Adult)   Pulse 72   Ht 5' 5\" (1.651 m)   Wt 119 lb (54 kg)   LMP 1995   BMI 19.80 kg/m²   Allergies:  Adhesive tape, Morphine, Amoxil [amoxicillin], Cefdinir, Fosamax [alendronate], Pantoprazole, Simvastatin, Zetia [ezetimibe], and Zocor [simvastatin - high dose]  Past Medical History:   Diagnosis Date    A-fib ([address]     Anxiety     Asthma     Cancer ([address] 2018    squamous cell- eye brow - CC    Chronic constipation     Colon polyps     COPD (chronic obstructive pulmonary disease) (HCC)     Cyst of nipple     Endometriosis     Fibromyalgia     Goiter     Goiter     Hyperlipidemia     Hypertension     Hypothyroidism     Osteoarthritis     Thyroid ca ([address]     TMJ (dislocation of temporomandibular joint)     TMJ arthritis      Past Surgical History:   Procedure Laterality Date    COLONOSCOPY      DIAGNOSTIC CARDIAC CATH LAB PROCEDURE  2019    THYROIDECTOMY      THYROIDECTOMY Left 2015     OB History          1    Para   1    Term   1            AB        Living             SAB        IAB Ectopic        Molar        Multiple        Live Births                  Family History   Problem Relation Age of Onset    Heart Disease Mother 61        CABG    Cancer Mother [de-identified]        lung    High Blood Pressure Mother     Heart Disease Father 48    High Blood Pressure Maternal Grandmother     High Blood Pressure Paternal Grandmother     Heart Attack Neg Hx     Hypertension Neg Hx     Diabetes Neg Hx     Breast Cancer Neg Hx      Social History     Socioeconomic History    Marital status:      Spouse name: Not on file    Number of children: Not on file    Years of education: Not on file    Highest education level: Not on file   Occupational History    Not on file   Tobacco Use    Smoking status: Some Days     Packs/day: 1.00     Years: 45.00     Pack years: 45.00     Types: Cigarettes     Start date: 1966    Smokeless tobacco: Never    Tobacco comments:     9/23/19 now smoking 1 cig twice per day, had quit for 7 years at one point   Substance and Sexual Activity    Alcohol use: Yes    Drug use: No    Sexual activity: Yes     Partners: Male   Other Topics Concern    Not on file   Social History Narrative    Not on file     Social Determinants of Health     Financial Resource Strain: Low Risk     Difficulty of Paying Living Expenses: Not hard at all   Food Insecurity: No Food Insecurity    Worried About Running Out of Food in the Last Year: Never true    Ran Out of Food in the Last Year: Never true   Transportation Needs: Unknown    Lack of Transportation (Medical): Not on file    Lack of Transportation (Non-Medical):  No   Physical Activity: Sufficiently Active    Days of Exercise per Week: 7 days    Minutes of Exercise per Session: 60 min   Stress: Not on file   Social Connections: Not on file   Intimate Partner Violence: Not on file   Housing Stability: Unknown    Unable to Pay for Housing in the Last Year: Not on file    Number of Places Lived in the Last Year: Not on file    Unstable Housing in the Last Year: No       Contraceptive method:  none    Patient's medications, allergies, past medical, surgical, social and family histories were reviewed and updated as appropriate. Review of Systems  As per chief complaint   All other systems reviewed and are negative. Dyspareunia , vaginal pain , urinary incontinence     Physical Exam:  Vitals:  /76 (Site: Left Upper Arm, Position: Sitting, Cuff Size: Small Adult)   Pulse 72   Ht 5' 5\" (1.651 m)   Wt 119 lb (54 kg)   LMP 01/09/1995   BMI 19.80 kg/m²   Lungs: CTAB   Heart : Regular S1/S2, no M/R/G  Abdomen: Soft , NT, ND , + BS   Pelvic exam : ATROPHIC vaginal mucosa, tenderness noted on exam during introducing the speculum, no abnormal discharge, no evidence of prolapse. Assessment:      Diagnosis Orders   1. Urgency incontinence  Urinalysis    Culture, Urine      2. Atrophic vaginitis        3. Dyspareunia, female            Plan:     Previous anticholinergic trial failed due to side effects , headaches. Patient with history of CAD. Trial of Mirabegron 50 mg po daily for 2 weeks  Schedule for cystoscopy  Return in 2 weeks   Vaginal estrogen for atrophic vaginitis affecting intimate relation and quality of life . Urine sent for analysis and culture   Advised to use lubricants with sexual activity   Advised to use coconut oil topical vaginally as needed   Patient was seen with total face to face time of 30 minutes. More than 50% of this visit was counseling and education regarding The primary encounter diagnosis was Urgency incontinence. Diagnoses of Atrophic vaginitis and Dyspareunia, female were also pertinent to this visit. and Bladder Problem (Urinary frequency, stress incontinence. Referred by Mini Wiseman. )   as well as  counseling on preventative health maintenance follow-up.       Orders Placed This Encounter   Procedures    Culture, Urine     Standing Status:   Future     Number of Occurrences:   1     Standing Expiration Date: 3/14/2024     Order Specific Question:   Specify (ex-cath, midstream, cysto, etc)?     Answer:   midstream    Urinalysis     Standing Status:   Future     Number of Occurrences:   1     Standing Expiration Date:   3/13/2024     Orders Placed This Encounter   Medications    estradiol (ESTRACE VAGINAL) 0.1 MG/GM vaginal cream     Sig: Apply daily for 2 weeks then continue 3 times weekly     Dispense:  1 each     Refill:  3    mirabegron (MYRBETRIQ) 50 MG TB24     Sig: Lot: [id number] Exp: 3/2025     Dispense:  14 tablet     Refill:  0       Follow Up:  Return in about 2 weeks (around 3/28/2023) for F/U for results.        Luna Lester MD

## 2023-03-15 LAB — BACTERIA UR CULT: NORMAL

## 2023-03-20 DIAGNOSIS — I10 ESSENTIAL HYPERTENSION: ICD-10-CM

## 2023-03-20 NOTE — TELEPHONE ENCOUNTER
Comments: This request is coming from the pharmacy. Last Office Visit (last PCP visit):   10/10/2022    Next Visit Date:  Future Appointments   Date Time Provider Lashonda Villagran   [facility]   3/29/2023 11:30 AM Gonzalo Paige MD [address] [place] [address]   4/10/2023  1:45 PM Kaylee Duran  [facility]   7/17/2023 11:00 AM Mini Wiseman PA-C [facility]   7/17/2023 11:30 AM Mini Wiseman PA-C [address]       If hasn't been seen in over a year OR hasn't followed up according to last diabetes/ADHD visit, make appointment for patient before sending refill to provider.     Rx requested:  Requested Prescriptions     Pending Prescriptions Disp Refills    dilTIAZem (CARDIZEM) 60 MG tablet [Pharmacy Med Name: dilTIAZem HCl 60 MG Oral Tablet] 180 tablet 0     Sig: Take 1 tablet by mouth twice daily

## 2023-03-21 RX ORDER — DILTIAZEM HYDROCHLORIDE 60 MG/1
TABLET, FILM COATED ORAL
Qty: 180 TABLET | Refills: 0 | Status: SHIPPED | OUTPATIENT
Start: 2023-03-21

## 2023-03-27 ENCOUNTER — HOSPITAL ENCOUNTER (OUTPATIENT)
Dept: WOMENS IMAGING | Age: 73
Discharge: HOME | End: 2023-03-29
Payer: MEDICARE

## 2023-03-27 DIAGNOSIS — Z12.31 SCREENING MAMMOGRAM, ENCOUNTER FOR: ICD-10-CM

## 2023-03-27 PROCEDURE — 77063 BREAST TOMOSYNTHESIS BI: CPT

## 2023-03-29 ENCOUNTER — OFFICE VISIT (OUTPATIENT)
Dept: OBGYN CLINIC | Age: 73
End: 2023-03-29
Payer: MEDICARE

## 2023-03-29 VITALS
SYSTOLIC BLOOD PRESSURE: 136 MMHG | HEART RATE: 84 BPM | DIASTOLIC BLOOD PRESSURE: 72 MMHG | BODY MASS INDEX: 19.83 KG/M2 | HEIGHT: 65 IN | WEIGHT: 119 LBS

## 2023-03-29 DIAGNOSIS — N39.41 URGENCY INCONTINENCE: Primary | ICD-10-CM

## 2023-03-29 PROCEDURE — 3078F DIAST BP <80 MM HG: CPT | Performed by: OBSTETRICS & GYNECOLOGY

## 2023-03-29 PROCEDURE — 3075F SYST BP GE 130 - 139MM HG: CPT | Performed by: OBSTETRICS & GYNECOLOGY

## 2023-03-29 PROCEDURE — 99213 OFFICE O/P EST LOW 20 MIN: CPT | Performed by: OBSTETRICS & GYNECOLOGY

## 2023-03-29 PROCEDURE — 1123F ACP DISCUSS/DSCN MKR DOCD: CPT | Performed by: OBSTETRICS & GYNECOLOGY

## 2023-03-29 RX ORDER — SOLIFENACIN SUCCINATE 5 MG/1
5 TABLET, FILM COATED ORAL DAILY
Qty: 30 TABLET | Refills: 0 | Status: SHIPPED | OUTPATIENT
Start: 2023-03-29 | End: 2023-03-29

## 2023-03-29 RX ORDER — ESTRADIOL 10 UG/1
INSERT VAGINAL
Qty: 30 TABLET | Refills: 2 | Status: SHIPPED | OUTPATIENT
Start: 2023-03-29

## 2023-03-29 NOTE — PROGRESS NOTES
Hx     Breast Cancer Neg Hx      Social History     Socioeconomic History    Marital status:      Spouse name: Not on file    Number of children: Not on file    Years of education: Not on file    Highest education level: Not on file   Occupational History    Not on file   Tobacco Use    Smoking status: Some Days     Packs/day: 1.00     Years: 45.00     Pack years: 45.00     Types: Cigarettes     Start date: 1966    Smokeless tobacco: Never    Tobacco comments:     9/23/19 now smoking 1 cig twice per day, had quit for 7 years at one point   Substance and Sexual Activity    Alcohol use: Yes    Drug use: No    Sexual activity: Yes     Partners: Male   Other Topics Concern    Not on file   Social History Narrative    Not on file     Social Determinants of Health     Financial Resource Strain: Low Risk     Difficulty of Paying Living Expenses: Not hard at all   Food Insecurity: No Food Insecurity    Worried About [address] in the Last Year: Never true    [address]  [employer] in the Last Year: Never true   Transportation Needs: Unknown    Lack of Transportation (Medical): Not on file    Lack of Transportation (Non-Medical): No   Physical Activity: Sufficiently Active    Days of Exercise per Week: 7 days    Minutes of Exercise per Session: 60 min   Stress: Not on file   Social Connections: Not on file   Intimate Partner Violence: Not on file   Housing Stability: Unknown    Unable to Pay for Housing in the Last Year: Not on file    Number of Places Lived in the Last Year: Not on file    Unstable Housing in the Last Year: No         Patient's medications,allergies, past medical, surgical, social and family histories were reviewed andupdated as appropriate.       Current Outpatient Medications:     Estradiol (VAGIFEM) 10 MCG TABS vaginal tablet, 1 tablet vaginally three times weekly, Disp: 30 tablet, Rfl: 2    mirabegron (MYRBETRIQ) 50 MG TB24, Lot: [id number] Exp: 3/2025, Disp: 21 tablet, Rfl: 0    dilTIAZem

## 2023-03-30 DIAGNOSIS — F41.9 ANXIETY: Primary | ICD-10-CM

## 2023-03-30 RX ORDER — ALPRAZOLAM 0.5 MG/1
TABLET ORAL
Qty: 30 TABLET | Refills: 1 | Status: SHIPPED | OUTPATIENT
Start: 2023-03-30 | End: 2023-06-28

## 2023-03-30 NOTE — TELEPHONE ENCOUNTER
Patient requesting medication refill.  Please approve or deny this request.    Rx requested:  Requested Prescriptions     Pending Prescriptions Disp Refills    ALPRAZolam (XANAX) 0.5 MG tablet [Pharmacy Med Name: ALPRAZolam 0.5 MG Oral Tablet] 30 tablet 1     Sig: Take 1 tablet by mouth once daily         Last Office Visit:   2/9/2023      Next Visit Date:  Future Appointments   Date Time Provider Lashonda Celayai   4/10/2023  1:45 PM Gabriela Vizcarra  [facility]   4/19/2023 11:45 AM Alfred Duvall MD [facility] [address] [place] [address]   7/17/2023 11:00 AM Mini Wiseman PA-C [facility]   7/17/2023 11:30 AM Mini Wiseman PA-C [address] [address]

## 2023-04-19 ENCOUNTER — OFFICE VISIT (OUTPATIENT)
Dept: OBGYN CLINIC | Age: 73
End: 2023-04-19
Payer: MEDICARE

## 2023-04-19 VITALS
HEIGHT: 65 IN | BODY MASS INDEX: 20.16 KG/M2 | SYSTOLIC BLOOD PRESSURE: 112 MMHG | HEART RATE: 86 BPM | WEIGHT: 121 LBS | DIASTOLIC BLOOD PRESSURE: 78 MMHG

## 2023-04-19 DIAGNOSIS — N39.41 URGENCY INCONTINENCE: Primary | ICD-10-CM

## 2023-04-19 PROCEDURE — 3074F SYST BP LT 130 MM HG: CPT | Performed by: OBSTETRICS & GYNECOLOGY

## 2023-04-19 PROCEDURE — 99213 OFFICE O/P EST LOW 20 MIN: CPT | Performed by: OBSTETRICS & GYNECOLOGY

## 2023-04-19 PROCEDURE — 3078F DIAST BP <80 MM HG: CPT | Performed by: OBSTETRICS & GYNECOLOGY

## 2023-04-19 PROCEDURE — 1123F ACP DISCUSS/DSCN MKR DOCD: CPT | Performed by: OBSTETRICS & GYNECOLOGY

## 2023-04-19 NOTE — PROGRESS NOTES
well-nourished, in no acute distress  Pulmonary/Chest:clear to auscultation bilaterally- no wheezes, rales or rhonchi, normal air movement,no respiratory distress  Cardiovascular: normal rate, normal S1 and S2, no gallops,intact distal pulses and no carotid bruits  Abdomen: soft, non-tender  Pelvic:deferred    Assessment:      Diagnosis Orders   1. Urgency incontinence            Was medication effective in resolving symptomsfor patient ? yes -   Plan:     Mirabegron 50 mg prescribed  Continue vaginal estrogen   Return as needed    No orders of the defined types were placed in this encounter. Orders Placed This Encounter   Medications    mirabegron (MYRBETRIQ) 50 MG TB24     Sig: Take 50 mg by mouth daily     Dispense:  30 tablet     Refill:  6       Follow up:  Return if symptoms worsen or fail to improve, for next annual exam visit.         Jaylen Ward MD

## 2023-04-28 ENCOUNTER — HOSPITAL ENCOUNTER (OUTPATIENT)
Dept: ULTRASOUND IMAGING | Age: 73
End: 2023-04-28
Payer: MEDICARE

## 2023-04-28 DIAGNOSIS — I65.23 BILATERAL CAROTID ARTERY STENOSIS: ICD-10-CM

## 2023-04-28 PROCEDURE — 93880 EXTRACRANIAL BILAT STUDY: CPT | Performed by: INTERNAL MEDICINE

## 2023-04-28 PROCEDURE — 93880 EXTRACRANIAL BILAT STUDY: CPT

## 2023-05-12 DIAGNOSIS — E78.5 HYPERLIPIDEMIA, UNSPECIFIED HYPERLIPIDEMIA TYPE: Primary | ICD-10-CM

## 2023-05-12 DIAGNOSIS — E78.2 MIXED HYPERLIPIDEMIA: ICD-10-CM

## 2023-05-12 NOTE — TELEPHONE ENCOUNTER
PLEASE FILL REPATHA-PLEASE CALL PT WITH UPDATES  SHOULD PT CONTINUE WITH LIVALO WITH REPATHA?  PLEASE ADVISE

## 2023-05-26 DIAGNOSIS — F41.9 ANXIETY: ICD-10-CM

## 2023-05-26 RX ORDER — ALPRAZOLAM 0.5 MG/1
TABLET ORAL
Qty: 30 TABLET | Refills: 0 | Status: SHIPPED | OUTPATIENT
Start: 2023-05-26 | End: 2023-08-24

## 2023-05-26 NOTE — TELEPHONE ENCOUNTER
Please approve or deny request. Thank you!     Rx requested:  Requested Prescriptions     Pending Prescriptions Disp Refills    ALPRAZolam (XANAX) 0.5 MG tablet [Pharmacy Med Name: ALPRAZolam 0.5 MG Oral Tablet] 30 tablet 0     Sig: Take 1 tablet by mouth once daily         Last Office Visit:   2/9/2023      Next Visit Date:  Future Appointments   Date Time Provider Lashonda Villagran   7/17/2023 11:00 AM VANI Saleem [facility]   7/17/2023 11:30 AM Mini Plummer [facility]   10/9/2023 12:30 PM Hina Henley  [facility]   10/23/2023 11:30 AM Milind Marques MD [address] [place] [address]

## 2023-06-07 ENCOUNTER — TELEPHONE (OUTPATIENT)
Dept: CARDIOLOGY CLINIC | Age: 73
End: 2023-06-07

## 2023-06-07 NOTE — TELEPHONE ENCOUNTER
Does pt stay on the [place] after she starts taking the [address] [place] [address] Avenue? Also, pt said we told her she was to come in to take the shot, I told her she gives them to herself. Pt would like someone to contact her regarding this.     Pt # [phone]

## 2023-06-13 DIAGNOSIS — F41.9 ANXIETY: ICD-10-CM

## 2023-06-13 RX ORDER — ALPRAZOLAM 0.5 MG/1
TABLET ORAL
Qty: 30 TABLET | Refills: 0 | OUTPATIENT
Start: 2023-06-13 | End: 2023-09-11

## 2023-06-27 DIAGNOSIS — F41.9 ANXIETY: ICD-10-CM

## 2023-06-27 DIAGNOSIS — E78.2 MIXED HYPERLIPIDEMIA: ICD-10-CM

## 2023-06-27 DIAGNOSIS — I10 ESSENTIAL HYPERTENSION: ICD-10-CM

## 2023-06-27 RX ORDER — ALPRAZOLAM 0.5 MG/1
TABLET ORAL
Qty: 30 TABLET | Refills: 1 | Status: SHIPPED | OUTPATIENT
Start: 2023-06-27 | End: 2023-08-24

## 2023-06-28 RX ORDER — PITAVASTATIN CALCIUM 1.04 MG/1
TABLET, FILM COATED ORAL
Qty: 15 TABLET | Refills: 1 | Status: SHIPPED | OUTPATIENT
Start: 2023-06-28

## 2023-07-17 ENCOUNTER — OFFICE VISIT (OUTPATIENT)
Dept: FAMILY MEDICINE CLINIC | Age: 73
End: 2023-07-17
Payer: MEDICARE

## 2023-07-17 VITALS
BODY MASS INDEX: 19.66 KG/M2 | HEART RATE: 62 BPM | SYSTOLIC BLOOD PRESSURE: 122 MMHG | DIASTOLIC BLOOD PRESSURE: 76 MMHG | WEIGHT: 118 LBS | OXYGEN SATURATION: 97 % | TEMPERATURE: 97.5 F | HEIGHT: 65 IN

## 2023-07-17 DIAGNOSIS — F17.200 NICOTINE USE DISORDER: ICD-10-CM

## 2023-07-17 DIAGNOSIS — J30.9 ALLERGIC RHINITIS, UNSPECIFIED SEASONALITY, UNSPECIFIED TRIGGER: ICD-10-CM

## 2023-07-17 DIAGNOSIS — I65.23 BILATERAL CAROTID ARTERY STENOSIS: ICD-10-CM

## 2023-07-17 DIAGNOSIS — C44.91 CANCER OF THE SKIN, BASAL CELL: ICD-10-CM

## 2023-07-17 DIAGNOSIS — R91.8 LUNG NODULES: ICD-10-CM

## 2023-07-17 DIAGNOSIS — F41.9 ANXIETY: ICD-10-CM

## 2023-07-17 DIAGNOSIS — E03.4 HYPOTHYROIDISM DUE TO ACQUIRED ATROPHY OF THYROID: ICD-10-CM

## 2023-07-17 DIAGNOSIS — N39.3 SUI (STRESS URINARY INCONTINENCE, FEMALE): ICD-10-CM

## 2023-07-17 DIAGNOSIS — Z00.00 MEDICARE ANNUAL WELLNESS VISIT, SUBSEQUENT: Primary | ICD-10-CM

## 2023-07-17 DIAGNOSIS — J43.2 CENTRILOBULAR EMPHYSEMA (HCC): Primary | ICD-10-CM

## 2023-07-17 DIAGNOSIS — C44.91 SKIN CANCER, BASAL CELL: ICD-10-CM

## 2023-07-17 PROCEDURE — 3078F DIAST BP <80 MM HG: CPT | Performed by: PHYSICIAN ASSISTANT

## 2023-07-17 PROCEDURE — G0439 PPPS, SUBSEQ VISIT: HCPCS | Performed by: PHYSICIAN ASSISTANT

## 2023-07-17 PROCEDURE — 1123F ACP DISCUSS/DSCN MKR DOCD: CPT | Performed by: PHYSICIAN ASSISTANT

## 2023-07-17 PROCEDURE — 3074F SYST BP LT 130 MM HG: CPT | Performed by: PHYSICIAN ASSISTANT

## 2023-07-17 PROCEDURE — 99214 OFFICE O/P EST MOD 30 MIN: CPT | Performed by: PHYSICIAN ASSISTANT

## 2023-07-17 RX ORDER — GUAIFENESIN 600 MG/1
600 TABLET, EXTENDED RELEASE ORAL 2 TIMES DAILY
Qty: 60 TABLET | Refills: 5 | Status: SHIPPED | OUTPATIENT
Start: 2023-07-17 | End: 2024-01-13

## 2023-07-17 ASSESSMENT — LIFESTYLE VARIABLES
HOW MANY STANDARD DRINKS CONTAINING ALCOHOL DO YOU HAVE ON A TYPICAL DAY: 1 OR 2
HOW OFTEN DO YOU HAVE A DRINK CONTAINING ALCOHOL: MONTHLY OR LESS

## 2023-07-17 ASSESSMENT — PATIENT HEALTH QUESTIONNAIRE - PHQ9
SUM OF ALL RESPONSES TO PHQ QUESTIONS 1-9: 0
SUM OF ALL RESPONSES TO PHQ9 QUESTIONS 1 & 2: 0
SUM OF ALL RESPONSES TO PHQ QUESTIONS 1-9: 0
2. FEELING DOWN, DEPRESSED OR HOPELESS: 0
1. LITTLE INTEREST OR PLEASURE IN DOING THINGS: 0

## 2023-07-17 NOTE — PROGRESS NOTES
Medicare Annual Wellness Visit    Ariana Gtz is here for Medicare AWV (Patient presents today for medicare AWV.)    Assessment & Plan     Recommendations for Preventive Services Due: see orders and patient instructions/AVS.  Recommended screening schedule for the next 5-10 years is provided to the patient in written form: see Patient Instructions/AVS.     Return in about 1 year (around 7/17/2024). Subjective       Patient's complete Health Risk Assessment and screening values have been reviewed and are found in Flowsheets. The following problems were reviewed today and where indicated follow up appointments were made and/or referrals ordered. Positive Risk Factor Screenings with Interventions:                        Tobacco Use:  Tobacco Use: High Risk    Smoking Tobacco Use: Some Days    Smokeless Tobacco Use: Never    Passive Exposure: Not on file     E-cigarette/Vaping       Questions Responses    E-cigarette/Vaping Use     Start Date     Passive Exposure     Quit Date     Counseling Given     Comments         Interventions:  Patient declined any further intervention or treatment              Objective   There were no vitals filed for this visit. There is no height or weight on file to calculate BMI. Allergies   Allergen Reactions    Adhesive Tape Rash    Morphine      Tremor    Amoxil [Amoxicillin]      diarrhea    Cefdinir Itching and Other (See Comments)    Fosamax [Alendronate] Other (See Comments)     Muscle aches      Pantoprazole Other (See Comments)     abdominal cramping, uncontrolled BM. Simvastatin      muscle soreness. Zetia [Ezetimibe]     Zocor [Simvastatin - High Dose]      Prior to Visit Medications    Medication Sig Taking?  Authorizing Provider   LIVALO 1 MG TABS tablet TAKE 1/2 (ONE-HALF) TABLET BY MOUTH NIGHTLY Yes GADIEL Velazquez   ALPRAZolam (XANAX) 0.5 MG tablet Take 1 tablet by mouth once daily Yes Mini Wiseman PA-C   dilTIAZem (CARDIZEM)
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
## 2023-08-06 ENCOUNTER — HOSPITAL ENCOUNTER (EMERGENCY)
Age: 73
Discharge: HOME | End: 2023-08-07
Payer: MEDICARE

## 2023-08-06 DIAGNOSIS — J20.9 ACUTE BRONCHITIS WITH BRONCHOSPASM: Primary | ICD-10-CM

## 2023-08-06 DIAGNOSIS — B34.8 RHINOVIRUS: ICD-10-CM

## 2023-08-06 PROCEDURE — 0202U NFCT DS 22 TRGT SARS-COV-2: CPT

## 2023-08-06 PROCEDURE — 83735 ASSAY OF MAGNESIUM: CPT

## 2023-08-06 PROCEDURE — 84484 ASSAY OF TROPONIN QUANT: CPT

## 2023-08-06 PROCEDURE — 96365 THER/PROPH/DIAG IV INF INIT: CPT

## 2023-08-06 PROCEDURE — 80053 COMPREHEN METABOLIC PANEL: CPT

## 2023-08-06 PROCEDURE — 99285 EMERGENCY DEPT VISIT HI MDM: CPT

## 2023-08-06 PROCEDURE — 85025 COMPLETE CBC W/AUTO DIFF WBC: CPT

## 2023-08-06 PROCEDURE — 83880 ASSAY OF NATRIURETIC PEPTIDE: CPT

## 2023-08-06 PROCEDURE — 6360000002 HC RX W HCPCS: Performed by: PHYSICIAN ASSISTANT

## 2023-08-06 PROCEDURE — 83605 ASSAY OF LACTIC ACID: CPT

## 2023-08-06 PROCEDURE — 82550 ASSAY OF CK (CPK): CPT

## 2023-08-06 PROCEDURE — 36415 COLL VENOUS BLD VENIPUNCTURE: CPT

## 2023-08-06 PROCEDURE — 85610 PROTHROMBIN TIME: CPT

## 2023-08-06 PROCEDURE — 93005 ELECTROCARDIOGRAM TRACING: CPT | Performed by: PHYSICIAN ASSISTANT

## 2023-08-06 PROCEDURE — 84145 PROCALCITONIN (PCT): CPT

## 2023-08-06 RX ORDER — ALBUTEROL SULFATE 2.5 MG/3ML
2.5 SOLUTION RESPIRATORY (INHALATION)
Status: DISCONTINUED | OUTPATIENT
Start: 2023-08-06 | End: 2023-08-07 | Stop reason: HOSPADM

## 2023-08-06 RX ORDER — IPRATROPIUM BROMIDE AND ALBUTEROL SULFATE 2.5; .5 MG/3ML; MG/3ML
1 SOLUTION RESPIRATORY (INHALATION) ONCE
Status: COMPLETED | OUTPATIENT
Start: 2023-08-06 | End: 2023-08-07

## 2023-08-06 RX ORDER — MAGNESIUM SULFATE IN WATER 40 MG/ML
2000 INJECTION, SOLUTION INTRAVENOUS ONCE
Status: COMPLETED | OUTPATIENT
Start: 2023-08-06 | End: 2023-08-07

## 2023-08-06 RX ADMIN — MAGNESIUM SULFATE HEPTAHYDRATE 2000 MG: 40 INJECTION, SOLUTION INTRAVENOUS at 23:57

## 2023-08-06 ASSESSMENT — LIFESTYLE VARIABLES
HOW OFTEN DO YOU HAVE A DRINK CONTAINING ALCOHOL: MONTHLY OR LESS
HOW MANY STANDARD DRINKS CONTAINING ALCOHOL DO YOU HAVE ON A TYPICAL DAY: 1 OR 2

## 2023-08-06 ASSESSMENT — ENCOUNTER SYMPTOMS
PHOTOPHOBIA: 0
RHINORRHEA: 1
SHORTNESS OF BREATH: 1
DIARRHEA: 0
VOMITING: 0
COUGH: 1
WHEEZING: 1
CHEST TIGHTNESS: 1

## 2023-08-06 ASSESSMENT — PAIN - FUNCTIONAL ASSESSMENT: PAIN_FUNCTIONAL_ASSESSMENT: NONE - DENIES PAIN

## 2023-08-07 ENCOUNTER — APPOINTMENT (OUTPATIENT)
Dept: GENERAL RADIOLOGY | Age: 73
End: 2023-08-07
Payer: MEDICARE

## 2023-08-07 VITALS
HEIGHT: 65 IN | OXYGEN SATURATION: 95 % | RESPIRATION RATE: 20 BRPM | WEIGHT: 118 LBS | SYSTOLIC BLOOD PRESSURE: 116 MMHG | BODY MASS INDEX: 19.66 KG/M2 | DIASTOLIC BLOOD PRESSURE: 63 MMHG | HEART RATE: 75 BPM | TEMPERATURE: 97.7 F

## 2023-08-07 LAB
ALBUMIN SERPL-MCNC: 4.5 G/DL (ref 3.5–4.6)
ALP SERPL-CCNC: 117 U/L (ref 40–130)
ALT SERPL-CCNC: 11 U/L (ref 0–33)
ANION GAP SERPL CALCULATED.3IONS-SCNC: 10 MEQ/L (ref 9–15)
AST SERPL-CCNC: 22 U/L (ref 0–35)
B PARAP IS1001 DNA NPH QL NAA+NON-PROBE: NOT DETECTED
B PERT.PT PRMT NPH QL NAA+NON-PROBE: NOT DETECTED
BASOPHILS # BLD: 0 K/UL (ref 0–0.2)
BASOPHILS NFR BLD: 0.3 %
BILIRUB SERPL-MCNC: <0.2 MG/DL (ref 0.2–0.7)
BNP BLD-MCNC: 78 PG/ML
BUN SERPL-MCNC: 16 MG/DL (ref 8–23)
C PNEUM DNA NPH QL NAA+NON-PROBE: NOT DETECTED
CALCIUM SERPL-MCNC: 9.3 MG/DL (ref 8.5–9.9)
CHLORIDE SERPL-SCNC: 103 MEQ/L (ref 95–107)
CK SERPL-CCNC: 106 U/L (ref 0–170)
CO2 SERPL-SCNC: 24 MEQ/L (ref 20–31)
CREAT SERPL-MCNC: 0.53 MG/DL (ref 0.5–0.9)
EKG ATRIAL RATE: 71 BPM
EKG P AXIS: 79 DEGREES
EKG P-R INTERVAL: 120 MS
EKG Q-T INTERVAL: 404 MS
EKG QRS DURATION: 74 MS
EKG QTC CALCULATION (BAZETT): 439 MS
EKG R AXIS: 80 DEGREES
EKG T AXIS: 80 DEGREES
EKG VENTRICULAR RATE: 71 BPM
EOSINOPHIL # BLD: 0.1 K/UL (ref 0–0.7)
EOSINOPHIL NFR BLD: 0.8 %
ERYTHROCYTE [DISTWIDTH] IN BLOOD BY AUTOMATED COUNT: 13.9 % (ref 11.5–14.5)
FLUAV RNA NPH QL NAA+NON-PROBE: NOT DETECTED
FLUBV RNA NPH QL NAA+NON-PROBE: NOT DETECTED
GLOBULIN SER CALC-MCNC: 2.2 G/DL (ref 2.3–3.5)
GLUCOSE SERPL-MCNC: 121 MG/DL (ref 70–99)
HADV DNA NPH QL NAA+NON-PROBE: NOT DETECTED
HCOV 229E RNA NPH QL NAA+NON-PROBE: NOT DETECTED
HCOV HKU1 RNA NPH QL NAA+NON-PROBE: NOT DETECTED
HCOV NL63 RNA NPH QL NAA+NON-PROBE: NOT DETECTED
HCOV OC43 RNA NPH QL NAA+NON-PROBE: NOT DETECTED
HCT VFR BLD AUTO: 41.3 % (ref 37–47)
HGB BLD-MCNC: 14 G/DL (ref 12–16)
HMPV RNA NPH QL NAA+NON-PROBE: NOT DETECTED
HPIV1 RNA NPH QL NAA+NON-PROBE: NOT DETECTED
HPIV2 RNA NPH QL NAA+NON-PROBE: NOT DETECTED
HPIV3 RNA NPH QL NAA+NON-PROBE: NOT DETECTED
HPIV4 RNA NPH QL NAA+NON-PROBE: NOT DETECTED
INR PPP: 1
LACTATE BLDV-SCNC: 1 MMOL/L (ref 0.5–2.2)
LYMPHOCYTES # BLD: 0.7 K/UL (ref 1–4.8)
LYMPHOCYTES NFR BLD: 11.1 %
M PNEUMO DNA NPH QL NAA+NON-PROBE: NOT DETECTED
MAGNESIUM SERPL-MCNC: 2.2 MG/DL (ref 1.7–2.4)
MCH RBC QN AUTO: 31.4 PG (ref 27–31.3)
MCHC RBC AUTO-ENTMCNC: 33.9 % (ref 33–37)
MCV RBC AUTO: 92.4 FL (ref 79.4–94.8)
MONOCYTES # BLD: 0.9 K/UL (ref 0.2–0.8)
MONOCYTES NFR BLD: 15.1 %
NEUTROPHILS # BLD: 4.6 K/UL (ref 1.4–6.5)
NEUTS SEG NFR BLD: 72.7 %
PLATELET # BLD AUTO: 171 K/UL (ref 130–400)
POTASSIUM SERPL-SCNC: 4.2 MEQ/L (ref 3.4–4.9)
PROCALCITONIN SERPL IA-MCNC: <0.02 NG/ML (ref 0–0.15)
PROT SERPL-MCNC: 6.7 G/DL (ref 6.3–8)
PROTHROMBIN TIME: 12.8 SEC (ref 12.3–14.9)
RBC # BLD AUTO: 4.47 M/UL (ref 4.2–5.4)
RSV RNA NPH QL NAA+NON-PROBE: NOT DETECTED
RV+EV RNA NPH QL NAA+NON-PROBE: DETECTED
SARS-COV-2 RNA NPH QL NAA+NON-PROBE: NOT DETECTED
SODIUM SERPL-SCNC: 137 MEQ/L (ref 135–144)
TROPONIN T SERPL-MCNC: <0.01 NG/ML (ref 0–0.01)
WBC # BLD AUTO: 6.3 K/UL (ref 4.8–10.8)

## 2023-08-07 PROCEDURE — 94640 AIRWAY INHALATION TREATMENT: CPT

## 2023-08-07 PROCEDURE — 94761 N-INVAS EAR/PLS OXIMETRY MLT: CPT

## 2023-08-07 PROCEDURE — 6370000000 HC RX 637 (ALT 250 FOR IP): Performed by: PHYSICIAN ASSISTANT

## 2023-08-07 PROCEDURE — 71045 X-RAY EXAM CHEST 1 VIEW: CPT

## 2023-08-07 PROCEDURE — 6360000002 HC RX W HCPCS: Performed by: PHYSICIAN ASSISTANT

## 2023-08-07 RX ORDER — ALBUTEROL SULFATE 2.5 MG/3ML
2.5 SOLUTION RESPIRATORY (INHALATION) EVERY 6 HOURS PRN
Qty: 120 EACH | Refills: 1 | Status: SHIPPED | OUTPATIENT
Start: 2023-08-07

## 2023-08-07 RX ORDER — PREDNISONE 10 MG/1
60 TABLET ORAL DAILY
Qty: 30 TABLET | Refills: 0 | Status: SHIPPED | OUTPATIENT
Start: 2023-08-07 | End: 2023-08-12

## 2023-08-07 RX ADMIN — ALBUTEROL SULFATE 2.5 MG: 2.5 SOLUTION RESPIRATORY (INHALATION) at 01:03

## 2023-08-07 RX ADMIN — IPRATROPIUM BROMIDE AND ALBUTEROL SULFATE 1 DOSE: .5; 2.5 SOLUTION RESPIRATORY (INHALATION) at 00:56

## 2023-08-07 NOTE — ED TRIAGE NOTES
EMS arrived for c/o SOB. Patient stated it was the second episode today, she used her inhaler but had no relief. Vitals upon EMS arrival: /100, HR 89,Resp. 20, O2 97%. Audible wheezing bilaterally. EMS gave duoneb and solumedrol, O2 98% after treatment.

## 2023-08-07 NOTE — ED PROVIDER NOTES
[facility] ED  EMERGENCY DEPARTMENT ENCOUNTER      Pt Name: Cachorro Drew  MRN: [id number]  [address] [place] 1950  Date of evaluation: 8/6/2023  Provider: John Paul Nielsen PA-C    CHIEF COMPLAINT       Chief Complaint   Patient presents with    Shortness of Breath     Second episode today, increasingly difficult to breathe         HISTORY OF PRESENT ILLNESS   (Location/Symptom, Timing/Onset, Context/Setting, Quality, Duration, Modifying Factors, Severity)  Note limiting factors. Cachorro Drew is a 67 y.o. female who presents to the emergency department with complaints of 2 episodes of shortness of breath and a \"hot feeling\" throughout her body that occurred today. Patient states that she has known seasonal allergies and has had cough and congestion however this evening she had sudden onset of chest tightness and shortness of breath. Was accompanied by this hot feeling. Patient states that it lasted for several seconds to a minute and then resolved. Patient states she had to take her dog out and had a second episode and called 911. EMS gave albuterol breathing treatments with improvement symptoms. Patient complains of a tight feeling in her chest and lungs still. HPI    Nursing Notes were reviewed. REVIEW OF SYSTEMS    (2-9 systems for level 4, 10 or more for level 5)     Review of Systems   Constitutional:  Positive for chills and fatigue. HENT:  Positive for congestion, postnasal drip and rhinorrhea. Eyes:  Negative for photophobia. Respiratory:  Positive for cough, chest tightness, shortness of breath and wheezing. Cardiovascular:  Negative for palpitations and leg swelling. Gastrointestinal:  Negative for diarrhea and vomiting. Neurological:  Negative for syncope and headaches. All other systems reviewed and are negative. Except as noted above the remainder of the review of systems was reviewed and negative.        PAST MEDICAL HISTORY     Past Medical History: emergency department 2 episodes of chest tightness with associated shortness of breath. Patient does admit to feeling very anxious during his episodes. EKG and cardiac enzymes negative and this does not seem to be a presentation of ACS today. Patient has no tachycardia or hypoxia to suggest pulmonary emboli. Rhinovirus is positive, laboratory testing was unremarkable. Discussed likely viral URI/bronchitis with the patient. Admission was considered however given the patient has remained hemodynamically stable with good oxygenation on room air and no further episodes, she will be treated outpatient with steroids, albuterol and close PCP follow    Medical Decision Making  Amount and/or Complexity of Data Reviewed  Labs: ordered. Radiology: ordered. ECG/medicine tests: ordered. Risk  Prescription drug management. REASSESSMENT            CONSULTS:  None    PROCEDURES:  Unless otherwise noted below, none     Procedures        FINAL IMPRESSION      1. Acute bronchitis with bronchospasm    2. Rhinovirus          DISPOSITION/PLAN   DISPOSITION Discharge - Pending Orders Complete 08/07/2023 12:55:13 AM      PATIENT REFERRED TO:  Faisal Gomez PA-C  [facility] [id number] [phone]    Call in 1 day        DISCHARGE MEDICATIONS:  New Prescriptions    ALBUTEROL (PROVENTIL) (2.5 MG/3ML) 0.083% NEBULIZER SOLUTION    Take 3 mLs by nebulization every 6 hours as needed for Wheezing    PREDNISONE (DELTASONE) 10 MG TABLET    Take 6 tablets by mouth daily for 5 doses     Controlled Substances Monitoring:     RX Monitoring 7/2/2021   Attestation -   Periodic Controlled Substance Monitoring No signs of potential drug abuse or diversion identified.    Chronic Pain > 80 MEDD -       (Please note that portions of this note were completed with a voice recognition program.  Efforts were made to edit the dictations but occasionally words are mis-transcribed.)    Shaun Galindo PA-C (electronically

## 2023-08-08 DIAGNOSIS — J44.1 COPD WITH ACUTE EXACERBATION (HCC): ICD-10-CM

## 2023-08-08 RX ORDER — BENZONATATE 200 MG/1
200 CAPSULE ORAL 3 TIMES DAILY PRN
Qty: 30 CAPSULE | Refills: 1 | Status: SHIPPED | OUTPATIENT
Start: 2023-08-08 | End: 2023-10-07

## 2023-08-08 NOTE — TELEPHONE ENCOUNTER
Patient was seen in ER for Bronch 8/6/23 by ambulance   They said they would give her something for the cough but did not  Patient is stating that Tessalon 200 mg works very well for her    Patient was given prednisone by the Er dr and patient noticed that it is directed to take 6 tablets by mouth for 5 days    patient asking if she isn't suppose to wean off the med??

## 2023-08-24 DIAGNOSIS — I10 ESSENTIAL HYPERTENSION: ICD-10-CM

## 2023-08-24 DIAGNOSIS — F41.9 ANXIETY: ICD-10-CM

## 2023-08-24 DIAGNOSIS — E78.2 MIXED HYPERLIPIDEMIA: ICD-10-CM

## 2023-08-24 RX ORDER — ALPRAZOLAM 0.5 MG/1
TABLET ORAL
Qty: 30 TABLET | Refills: 2 | Status: SHIPPED | OUTPATIENT
Start: 2023-08-24 | End: 2023-11-22

## 2023-08-24 RX ORDER — PITAVASTATIN CALCIUM 1.04 MG/1
TABLET, FILM COATED ORAL
Qty: 30 TABLET | Refills: 3 | Status: SHIPPED | OUTPATIENT
Start: 2023-08-24

## 2023-08-24 NOTE — TELEPHONE ENCOUNTER
Requesting medication refill. Please approve or deny this request.    Rx requested:  Requested Prescriptions     Pending Prescriptions Disp Refills    LIVALO 1 MG TABS tablet [Pharmacy Med Name: Livalo 1 MG Oral Tablet] 15 tablet 0     Sig: TAKE 1/2 (ONE-HALF) TABLET BY MOUTH NIGHTLY         Last Office Visit:   4/10/2023      Next Visit Date:  Future Appointments   Date Time Provider [address]   10/9/2023 12:30 PM Yohan Mariano MD [facility]   10/23/2023 11:30 AM Prudence Lopez MD [address]   7/17/2024 11:00 AM Mini Wong PA-C [facility] [address]   7/17/2024 11:30 AM Mini Wiseman PA-C [address] [place] [address]               Last refill 6/28/23. Please approve or deny.

## 2023-08-28 DIAGNOSIS — J30.9 ALLERGIC RHINITIS, UNSPECIFIED SEASONALITY, UNSPECIFIED TRIGGER: ICD-10-CM

## 2023-08-28 RX ORDER — CITALOPRAM 40 MG/1
TABLET ORAL
Qty: 90 TABLET | Refills: 1 | Status: SHIPPED | OUTPATIENT
Start: 2023-08-28

## 2023-08-28 RX ORDER — LORATADINE 10 MG/1
TABLET ORAL
Qty: 30 TABLET | Refills: 5 | Status: SHIPPED | OUTPATIENT
Start: 2023-08-28

## 2023-08-28 NOTE — TELEPHONE ENCOUNTER
Patient is requesting medication refill.  Please approve or deny this request.    Rx requested:  Requested Prescriptions     Pending Prescriptions Disp Refills    loratadine (EQ ALLERGY RELIEF) 10 MG tablet [Pharmacy Med Name: ALLERGY RELIEF (LAMONTE) 10MG TAB] 30 tablet 0     Sig: TAKE 1 TABLET BY MOUTH NIGHTLY AS NEEDED FOR ALLERGIES    citalopram (CELEXA) 40 MG tablet [Pharmacy Med Name: Citalopram Hydrobromide 40 MG Oral Tablet] 90 tablet 0     Sig: Take 1 tablet by mouth once daily         Last Office Visit:   7/17/2023      Next Visit Date:  Future Appointments   Date Time Provider [address]   10/9/2023 12:30 PM Lulu Hart, [address] [facility]   10/23/2023 11:30 AM Christiana Goodrich MD [facility]   7/17/2024 11:00 AM Mini Wiseman PA-C [facility] [address]   7/17/2024 11:30 AM Mini Wiseman PA-C [facility]

## 2023-09-28 DIAGNOSIS — J44.1 COPD WITH ACUTE EXACERBATION (HCC): ICD-10-CM

## 2023-09-28 DIAGNOSIS — J30.9 ALLERGIC RHINITIS, UNSPECIFIED SEASONALITY, UNSPECIFIED TRIGGER: ICD-10-CM

## 2023-09-29 RX ORDER — BENZONATATE 200 MG/1
CAPSULE ORAL
Qty: 30 CAPSULE | Refills: 1 | Status: SHIPPED | OUTPATIENT
Start: 2023-09-29

## 2023-09-29 RX ORDER — AZELASTINE HYDROCHLORIDE 137 UG/1
SPRAY, METERED NASAL
Qty: 120 ML | Refills: 5 | Status: SHIPPED | OUTPATIENT
Start: 2023-09-29

## 2023-10-01 ENCOUNTER — HOSPITAL ENCOUNTER (EMERGENCY)
Age: 73
Discharge: HOME | End: 2023-10-02
Attending: EMERGENCY MEDICINE
Payer: MEDICARE

## 2023-10-01 DIAGNOSIS — N30.01 ACUTE CYSTITIS WITH HEMATURIA: Primary | ICD-10-CM

## 2023-10-01 PROCEDURE — 99285 EMERGENCY DEPT VISIT HI MDM: CPT

## 2023-10-01 ASSESSMENT — PAIN DESCRIPTION - ORIENTATION: ORIENTATION: LOWER

## 2023-10-01 ASSESSMENT — PAIN DESCRIPTION - LOCATION: LOCATION: ABDOMEN;BACK

## 2023-10-01 ASSESSMENT — PAIN DESCRIPTION - ONSET: ONSET: ON-GOING

## 2023-10-01 ASSESSMENT — PAIN DESCRIPTION - DESCRIPTORS: DESCRIPTORS: CRAMPING

## 2023-10-01 ASSESSMENT — PAIN DESCRIPTION - FREQUENCY: FREQUENCY: CONTINUOUS

## 2023-10-01 ASSESSMENT — PAIN SCALES - GENERAL: PAINLEVEL_OUTOF10: 2

## 2023-10-02 ENCOUNTER — APPOINTMENT (OUTPATIENT)
Dept: CT IMAGING | Age: 73
End: 2023-10-02
Payer: MEDICARE

## 2023-10-02 VITALS
TEMPERATURE: 98.5 F | BODY MASS INDEX: 19.64 KG/M2 | RESPIRATION RATE: 18 BRPM | OXYGEN SATURATION: 100 % | WEIGHT: 118 LBS | SYSTOLIC BLOOD PRESSURE: 156 MMHG | HEART RATE: 71 BPM | DIASTOLIC BLOOD PRESSURE: 65 MMHG

## 2023-10-02 LAB
ALBUMIN SERPL-MCNC: 4.8 G/DL (ref 3.5–4.6)
ALP SERPL-CCNC: 126 U/L (ref 40–130)
ALT SERPL-CCNC: 17 U/L (ref 0–33)
ANION GAP SERPL CALCULATED.3IONS-SCNC: 11 MEQ/L (ref 9–15)
AST SERPL-CCNC: 25 U/L (ref 0–35)
BACTERIA URNS QL MICRO: NEGATIVE /HPF
BASOPHILS # BLD: 0 K/UL (ref 0–0.2)
BASOPHILS NFR BLD: 0.2 %
BILIRUB SERPL-MCNC: <0.2 MG/DL (ref 0.2–0.7)
BILIRUB UR QL STRIP: NEGATIVE
BUN SERPL-MCNC: 16 MG/DL (ref 8–23)
CALCIUM SERPL-MCNC: 9.3 MG/DL (ref 8.5–9.9)
CHLORIDE SERPL-SCNC: 101 MEQ/L (ref 95–107)
CLARITY UR: ABNORMAL
CO2 SERPL-SCNC: 28 MEQ/L (ref 20–31)
COLOR UR: ABNORMAL
CREAT SERPL-MCNC: 0.73 MG/DL (ref 0.5–0.9)
EOSINOPHIL # BLD: 0.2 K/UL (ref 0–0.7)
EOSINOPHIL NFR BLD: 2.1 %
EPI CELLS #/AREA URNS AUTO: ABNORMAL /HPF (ref 0–5)
ERYTHROCYTE [DISTWIDTH] IN BLOOD BY AUTOMATED COUNT: 13.4 % (ref 11.5–14.5)
GLOBULIN SER CALC-MCNC: 2.6 G/DL (ref 2.3–3.5)
GLUCOSE SERPL-MCNC: 97 MG/DL (ref 70–99)
GLUCOSE UR STRIP-MCNC: NEGATIVE MG/DL
HCT VFR BLD AUTO: 46.2 % (ref 37–47)
HGB BLD-MCNC: 14.7 G/DL (ref 12–16)
HGB UR QL STRIP: ABNORMAL
HYALINE CASTS #/AREA URNS AUTO: ABNORMAL /HPF (ref 0–5)
KETONES UR STRIP-MCNC: NEGATIVE MG/DL
LEUKOCYTE ESTERASE UR QL STRIP: ABNORMAL
LIPASE SERPL-CCNC: 82 U/L (ref 12–95)
LYMPHOCYTES # BLD: 1.8 K/UL (ref 1–4.8)
LYMPHOCYTES NFR BLD: 18 %
MCH RBC QN AUTO: 30.6 PG (ref 27–31.3)
MCHC RBC AUTO-ENTMCNC: 31.8 % (ref 33–37)
MCV RBC AUTO: 96 FL (ref 79.4–94.8)
MONOCYTES # BLD: 1.1 K/UL (ref 0.2–0.8)
MONOCYTES NFR BLD: 10.8 %
NEUTROPHILS # BLD: 6.7 K/UL (ref 1.4–6.5)
NEUTS SEG NFR BLD: 68.4 %
NITRITE UR QL STRIP: NEGATIVE
PERFORMED ON: NORMAL
PH UR STRIP: 5.5 [PH] (ref 5–9)
PLATELET # BLD AUTO: 241 K/UL (ref 130–400)
POC CREATININE WHOLE BLOOD: 0.8
POC CREATININE: 0.8 MG/DL (ref 0.6–1.2)
POC SAMPLE TYPE: NORMAL
POTASSIUM SERPL-SCNC: 4.3 MEQ/L (ref 3.4–4.9)
PROT SERPL-MCNC: 7.4 G/DL (ref 6.3–8)
PROT UR STRIP-MCNC: 100 MG/DL
RBC # BLD AUTO: 4.81 M/UL (ref 4.2–5.4)
RBC #/AREA URNS AUTO: >100 /HPF (ref 0–5)
SODIUM SERPL-SCNC: 140 MEQ/L (ref 135–144)
SP GR UR STRIP: 1.01 (ref 1–1.03)
URINE REFLEX TO CULTURE: YES
UROBILINOGEN UR STRIP-ACNC: 0.2 E.U./DL
WBC # BLD AUTO: 9.7 K/UL (ref 4.8–10.8)
WBC #/AREA URNS AUTO: ABNORMAL /HPF (ref 0–5)

## 2023-10-02 PROCEDURE — 2580000003 HC RX 258: Performed by: EMERGENCY MEDICINE

## 2023-10-02 PROCEDURE — 87186 SC STD MICRODIL/AGAR DIL: CPT

## 2023-10-02 PROCEDURE — 87077 CULTURE AEROBIC IDENTIFY: CPT

## 2023-10-02 PROCEDURE — 83690 ASSAY OF LIPASE: CPT

## 2023-10-02 PROCEDURE — 74177 CT ABD & PELVIS W/CONTRAST: CPT

## 2023-10-02 PROCEDURE — 6360000002 HC RX W HCPCS: Performed by: EMERGENCY MEDICINE

## 2023-10-02 PROCEDURE — 6360000004 HC RX CONTRAST MEDICATION: Performed by: EMERGENCY MEDICINE

## 2023-10-02 PROCEDURE — 96374 THER/PROPH/DIAG INJ IV PUSH: CPT

## 2023-10-02 PROCEDURE — 85025 COMPLETE CBC W/AUTO DIFF WBC: CPT

## 2023-10-02 PROCEDURE — 96375 TX/PRO/DX INJ NEW DRUG ADDON: CPT

## 2023-10-02 PROCEDURE — 80053 COMPREHEN METABOLIC PANEL: CPT

## 2023-10-02 PROCEDURE — 6370000000 HC RX 637 (ALT 250 FOR IP): Performed by: EMERGENCY MEDICINE

## 2023-10-02 PROCEDURE — 87086 URINE CULTURE/COLONY COUNT: CPT

## 2023-10-02 PROCEDURE — 81001 URINALYSIS AUTO W/SCOPE: CPT

## 2023-10-02 PROCEDURE — 36415 COLL VENOUS BLD VENIPUNCTURE: CPT

## 2023-10-02 RX ORDER — DIPHENHYDRAMINE HYDROCHLORIDE 50 MG/ML
25 INJECTION INTRAMUSCULAR; INTRAVENOUS ONCE
Status: COMPLETED | OUTPATIENT
Start: 2023-10-02 | End: 2023-10-02

## 2023-10-02 RX ORDER — CIPROFLOXACIN 500 MG/1
500 TABLET, FILM COATED ORAL 2 TIMES DAILY
Qty: 20 TABLET | Refills: 0 | Status: SHIPPED | OUTPATIENT
Start: 2023-10-02 | End: 2023-10-12

## 2023-10-02 RX ORDER — FENTANYL CITRATE 0.05 MG/ML
50 INJECTION, SOLUTION INTRAMUSCULAR; INTRAVENOUS ONCE
Status: COMPLETED | OUTPATIENT
Start: 2023-10-02 | End: 2023-10-02

## 2023-10-02 RX ORDER — CIPROFLOXACIN 500 MG/1
500 TABLET, FILM COATED ORAL ONCE
Status: COMPLETED | OUTPATIENT
Start: 2023-10-02 | End: 2023-10-02

## 2023-10-02 RX ORDER — ACETAMINOPHEN 325 MG/1
650 TABLET ORAL ONCE
Status: COMPLETED | OUTPATIENT
Start: 2023-10-02 | End: 2023-10-02

## 2023-10-02 RX ORDER — 0.9 % SODIUM CHLORIDE 0.9 %
1000 INTRAVENOUS SOLUTION INTRAVENOUS ONCE
Status: COMPLETED | OUTPATIENT
Start: 2023-10-02 | End: 2023-10-02

## 2023-10-02 RX ORDER — METOCLOPRAMIDE HYDROCHLORIDE 5 MG/ML
10 INJECTION INTRAMUSCULAR; INTRAVENOUS ONCE
Status: COMPLETED | OUTPATIENT
Start: 2023-10-02 | End: 2023-10-02

## 2023-10-02 RX ADMIN — CIPROFLOXACIN 500 MG: 500 TABLET, FILM COATED ORAL at 02:16

## 2023-10-02 RX ADMIN — ACETAMINOPHEN 650 MG: 325 TABLET ORAL at 00:34

## 2023-10-02 RX ADMIN — DIPHENHYDRAMINE HYDROCHLORIDE 25 MG: 50 INJECTION, SOLUTION INTRAMUSCULAR; INTRAVENOUS at 00:35

## 2023-10-02 RX ADMIN — SODIUM CHLORIDE 1000 ML: 9 INJECTION, SOLUTION INTRAVENOUS at 00:33

## 2023-10-02 RX ADMIN — IOPAMIDOL 50 ML: 612 INJECTION, SOLUTION INTRAVENOUS at 00:51

## 2023-10-02 RX ADMIN — METOCLOPRAMIDE HYDROCHLORIDE 10 MG: 5 INJECTION INTRAMUSCULAR; INTRAVENOUS at 00:36

## 2023-10-02 RX ADMIN — FENTANYL CITRATE 50 MCG: 50 INJECTION INTRAMUSCULAR; INTRAVENOUS at 00:39

## 2023-10-02 ASSESSMENT — ENCOUNTER SYMPTOMS
SHORTNESS OF BREATH: 0
ABDOMINAL PAIN: 1
BACK PAIN: 1
BLOOD IN STOOL: 0
VOMITING: 0

## 2023-10-02 ASSESSMENT — PAIN SCALES - GENERAL
PAINLEVEL_OUTOF10: 2
PAINLEVEL_OUTOF10: 2
PAINLEVEL_OUTOF10: 3

## 2023-10-02 ASSESSMENT — PAIN DESCRIPTION - LOCATION
LOCATION: ABDOMEN
LOCATION: HEAD

## 2023-10-02 ASSESSMENT — PAIN - FUNCTIONAL ASSESSMENT: PAIN_FUNCTIONAL_ASSESSMENT: 0-10

## 2023-10-02 ASSESSMENT — PAIN DESCRIPTION - DESCRIPTORS: DESCRIPTORS: ACHING

## 2023-10-02 NOTE — ED NOTES
Pt c/o abdominal cramping, back pain, and hematuria starting tonight.      Twin Blancas  10/01/23 0139

## 2023-10-02 NOTE — ED PROVIDER NOTES
Doubt significant concurrent ascending infection  Medical Decision Making  Amount and/or Complexity of Data Reviewed  Labs: ordered. Radiology: ordered. Risk  OTC drugs. Prescription drug management. Patient presents emergency department complaining of some lower abdominal and back discomfort as well as hematuria. Given age CT ordered. Labs reassuring. IV obtained, given IV fluid bolus, fentanyl, Reglan. Patient updated on CT showing no nephrolithiasis or concurrent stone. She has small leukocytes in urine and WBCs in urine. Urinary bladder on CT shows cystitis. Small and large bowel unremarkable. No bowel obstruction. Given her age will avoid Macrobid. She has allergies to penicillins and cephalosporins. Given age she has a complicated UTI. Will prescribe ciprofloxacin. Will be treated for hemorrhagic cystitis. Given her age and hematuria however will refer to urology on outpatient basis for possible cystoscopy. Urine culture pending    Afebrile, hemodynamically stable, no systemic symptoms, reassuring CT. Appropriate for discharge. CONSULTS:  None    PROCEDURES:  Unless otherwise noted below, none     Procedures        FINAL IMPRESSION    No diagnosis found. DISPOSITION/PLAN   DISPOSITION        PATIENT REFERRED TO:  No follow-up provider specified. DISCHARGE MEDICATIONS:  New Prescriptions    No medications on file     Controlled Substances Monitoring:     RX Monitoring Periodic Controlled Substance Monitoring   7/2/2021   2:23 PM No signs of potential drug abuse or diversion identified.        (Please note that portions of this note were completed with a voice recognition program.  Efforts were made to edit the dictations but occasionally words are mis-transcribed.)    Odessa Hernández MD (electronically signed)  Attending Emergency Physician            Odessa Hernández MD  10/02/23 [id number]

## 2023-10-02 NOTE — DISCHARGE INSTRUCTIONS
It is important that you return for any fever or worsening symptoms. You can take over-the-counter Motrin or Tylenol. Thank you. Stay hydrated.

## 2023-10-04 LAB
BACTERIA UR CULT: ABNORMAL
BACTERIA UR CULT: ABNORMAL
ORGANISM: ABNORMAL

## 2023-10-09 ENCOUNTER — OFFICE VISIT (OUTPATIENT)
Dept: CARDIOLOGY CLINIC | Age: 73
End: 2023-10-09
Payer: MEDICARE

## 2023-10-09 VITALS
WEIGHT: 123.2 LBS | DIASTOLIC BLOOD PRESSURE: 70 MMHG | BODY MASS INDEX: 20.53 KG/M2 | OXYGEN SATURATION: 100 % | HEART RATE: 71 BPM | SYSTOLIC BLOOD PRESSURE: 114 MMHG | HEIGHT: 65 IN

## 2023-10-09 DIAGNOSIS — I73.9 CLAUDICATION (HCC): ICD-10-CM

## 2023-10-09 DIAGNOSIS — I20.89 EXERCISE-INDUCED ANGINA: ICD-10-CM

## 2023-10-09 DIAGNOSIS — I48.91 ATRIAL FIBRILLATION, UNSPECIFIED TYPE (HCC): ICD-10-CM

## 2023-10-09 DIAGNOSIS — I25.10 CORONARY ARTERY DISEASE INVOLVING NATIVE CORONARY ARTERY OF NATIVE HEART WITHOUT ANGINA PECTORIS: ICD-10-CM

## 2023-10-09 DIAGNOSIS — E78.2 MIXED HYPERLIPIDEMIA: ICD-10-CM

## 2023-10-09 DIAGNOSIS — I10 ESSENTIAL HYPERTENSION: Primary | ICD-10-CM

## 2023-10-09 DIAGNOSIS — E78.5 HYPERLIPIDEMIA, UNSPECIFIED HYPERLIPIDEMIA TYPE: ICD-10-CM

## 2023-10-09 DIAGNOSIS — I65.23 BILATERAL CAROTID ARTERY STENOSIS: ICD-10-CM

## 2023-10-09 PROCEDURE — 3078F DIAST BP <80 MM HG: CPT | Performed by: INTERNAL MEDICINE

## 2023-10-09 PROCEDURE — 3074F SYST BP LT 130 MM HG: CPT | Performed by: INTERNAL MEDICINE

## 2023-10-09 PROCEDURE — 99214 OFFICE O/P EST MOD 30 MIN: CPT | Performed by: INTERNAL MEDICINE

## 2023-10-09 PROCEDURE — 1123F ACP DISCUSS/DSCN MKR DOCD: CPT | Performed by: INTERNAL MEDICINE

## 2023-10-09 ASSESSMENT — ENCOUNTER SYMPTOMS
CHEST TIGHTNESS: 0
COUGH: 0
RESPIRATORY NEGATIVE: 1
EYES NEGATIVE: 1
GASTROINTESTINAL NEGATIVE: 1
STRIDOR: 0
BLOOD IN STOOL: 0
NAUSEA: 0
SHORTNESS OF BREATH: 0
WHEEZING: 0

## 2023-10-09 NOTE — PROGRESS NOTES
Subsequent Progress Note  Patient: Christiana Fried  YOB: 1950  MRN: [id number]    Chief Complaint: carotid htn carotid cad  Chief Complaint   Patient presents with    6 Month Follow-Up    Hyperlipidemia       CV Data:  2/2017 spect negative ccf  2/2017 PVR negative ccf  1/2019 CUS extensive atherosclerosis R>L  1/19 echo ef 60   4/5/2019 CTA neck - CHERELLE 10%  LICA 34  4/51/6248 CATH LAD Mild RCA 20% EF 55 EDP 5   S/P Complete Thyroidectomy   12/2020 PVR negative  12/2020 CUS CHERELLE 50-69%   1/22 CUS B/L 50%    Subjective/HPI: no cp no sob no falls no bleed tolerating only 1/2 tab Livalo. Full tab caused severe myalgia. LDL not to goal.      1/13/2020 no cp no sob no falls no bleed f/u CT chest mild enlarged R nodule. 12/7/2020 still smokes. No cp no palp no falls no bleeed no sob    1/11/21 TELEHEALTH EVALUATION -- Audio[facility]Visual [facility]    No cp no sob no falls no bleed. Eats well still smokes    5/12/21 TELEHEALTH EVALUATION -- [facility]Visual [facility]    No cp no sob no falls no bleed take smeds doing well    9/16/21 now has bronchitis. No cp no sob no falls occ hemorrhoid bleed. Takes med    1/24/22 doing well no cp no sob no bleed. No falls takes meds. PFT at [facility] - moderate COPD    5/23/22 doing well no cp no so bno falls takes meds    10/10/22 doing well no cp no sob no falls nob leed. Takes meds. 4/10/23 er yesterday due to right facial swelling due to dental issues. CV wise doing well no cp no sob no falls no bleed. 10/9/23 recent Bronchitis. Recent UTI and hematuria. Resolved.   She has appt w [facility]. No cp         Smokes 1/2 ppd   Retired   Lives w      EKG: SR 77    Past Medical History:   Diagnosis Date    A-fib ([address])     Anxiety     Asthma     Cancer ([address]) 2018    squamous cell- eye brow - CC    Cancer of the skin, basal cell     Chronic constipation     Colon polyps     COPD (chronic

## 2023-10-10 DIAGNOSIS — E78.2 MIXED HYPERLIPIDEMIA: ICD-10-CM

## 2023-10-10 LAB
ALBUMIN SERPL-MCNC: 4.3 G/DL (ref 3.5–4.6)
ALP SERPL-CCNC: 101 U/L (ref 40–130)
ALT SERPL-CCNC: 17 U/L (ref 0–33)
ANION GAP SERPL CALCULATED.3IONS-SCNC: 9 MEQ/L (ref 9–15)
AST SERPL-CCNC: 24 U/L (ref 0–35)
BILIRUB SERPL-MCNC: 0.3 MG/DL (ref 0.2–0.7)
BUN SERPL-MCNC: 11 MG/DL (ref 8–23)
CALCIUM SERPL-MCNC: 8.9 MG/DL (ref 8.5–9.9)
CHLORIDE SERPL-SCNC: 104 MEQ/L (ref 95–107)
CHOLEST SERPL-MCNC: 148 MG/DL (ref 0–199)
CO2 SERPL-SCNC: 28 MEQ/L (ref 20–31)
CREAT SERPL-MCNC: 0.69 MG/DL (ref 0.5–0.9)
GLOBULIN SER CALC-MCNC: 2.1 G/DL (ref 2.3–3.5)
GLUCOSE SERPL-MCNC: 88 MG/DL (ref 70–99)
HDLC SERPL-MCNC: 105 MG/DL (ref 40–59)
LDL CHOLESTEROL CALCULATED: 31 MG/DL (ref 0–129)
POTASSIUM SERPL-SCNC: 4.2 MEQ/L (ref 3.4–4.9)
PROT SERPL-MCNC: 6.4 G/DL (ref 6.3–8)
SODIUM SERPL-SCNC: 141 MEQ/L (ref 135–144)
TRIGLYCERIDE, FASTING: 59 MG/DL (ref 0–150)

## 2023-10-25 ENCOUNTER — OFFICE VISIT (OUTPATIENT)
Dept: OBGYN CLINIC | Age: 73
End: 2023-10-25
Payer: MEDICARE

## 2023-10-25 VITALS
WEIGHT: 122 LBS | BODY MASS INDEX: 20.33 KG/M2 | SYSTOLIC BLOOD PRESSURE: 152 MMHG | DIASTOLIC BLOOD PRESSURE: 88 MMHG | HEIGHT: 65 IN | HEART RATE: 84 BPM

## 2023-10-25 DIAGNOSIS — N95.2 ATROPHIC VAGINITIS: ICD-10-CM

## 2023-10-25 DIAGNOSIS — N32.81 OAB (OVERACTIVE BLADDER): ICD-10-CM

## 2023-10-25 DIAGNOSIS — N89.5 VAGINAL STENOSIS: Primary | ICD-10-CM

## 2023-10-25 DIAGNOSIS — N94.10 DYSPAREUNIA, FEMALE: ICD-10-CM

## 2023-10-25 PROCEDURE — 1123F ACP DISCUSS/DSCN MKR DOCD: CPT | Performed by: OBSTETRICS & GYNECOLOGY

## 2023-10-25 PROCEDURE — 3079F DIAST BP 80-89 MM HG: CPT | Performed by: OBSTETRICS & GYNECOLOGY

## 2023-10-25 PROCEDURE — 3077F SYST BP >= 140 MM HG: CPT | Performed by: OBSTETRICS & GYNECOLOGY

## 2023-10-25 PROCEDURE — 99214 OFFICE O/P EST MOD 30 MIN: CPT | Performed by: OBSTETRICS & GYNECOLOGY

## 2023-11-22 DIAGNOSIS — F41.9 ANXIETY: ICD-10-CM

## 2023-11-22 RX ORDER — ALPRAZOLAM 0.5 MG/1
TABLET ORAL
Qty: 30 TABLET | Refills: 0 | Status: SHIPPED | OUTPATIENT
Start: 2023-11-22 | End: 2024-02-20

## 2023-11-22 NOTE — TELEPHONE ENCOUNTER
Future Appointments    Encounter Information    Provider Department Appt Notes   12/11/2023 Diallo Rucker MD [facility] Obstetrics and Gynecology medication fu   4/8/2024 Remington Freitas MD [facility] Cardiology 6 month follow up   7/17/2024 VANI Wallis AT [facility] 1 yr f/u AWV same day// sxc   7/17/2024 VANI Wallis AT [facility] Medicare AWV (1yr ov same day) // sxc     Past Visits    Date Provider Specialty Visit Type Primary Dx   10/25/2023 Diallo Rucker MD Obstetrics and Gynecology Office Visit Vaginal stenosis   10/09/2023 Remington Freitas MD Cardiology Office Visit Essential hypertension   [facility]

## 2023-12-11 ENCOUNTER — TELEPHONE (OUTPATIENT)
Dept: CARDIOLOGY CLINIC | Age: 73
End: 2023-12-11

## 2023-12-11 DIAGNOSIS — N39.41 URGENCY INCONTINENCE: ICD-10-CM

## 2023-12-11 PROBLEM — N89.5 VAGINAL STENOSIS: Status: ACTIVE | Noted: 2023-12-11

## 2023-12-11 PROBLEM — N76.0 VESTIBULITIS OF VAGINA: Status: ACTIVE | Noted: 2023-12-11

## 2023-12-11 LAB
BILIRUB UR QL STRIP: NEGATIVE
CLARITY UR: CLEAR
COLOR UR: YELLOW
GLUCOSE UR STRIP-MCNC: NEGATIVE MG/DL
HGB UR QL STRIP: NEGATIVE
KETONES UR STRIP-MCNC: NEGATIVE MG/DL
LEUKOCYTE ESTERASE UR QL STRIP: NEGATIVE
NITRITE UR QL STRIP: NEGATIVE
PH UR STRIP: 6.5 [PH] (ref 5–9)
PROT UR STRIP-MCNC: NEGATIVE MG/DL
SP GR UR STRIP: 1 (ref 1–1.03)
UROBILINOGEN UR STRIP-ACNC: 0.2 E.U./DL

## 2023-12-11 NOTE — TELEPHONE ENCOUNTER
Pt called to verify if she is able to stop asprin 3 days prior to her vaginal surgery in Jan. Dr. Ann Pena is going to send over a letter but pt wanted to ask provider as well.     Pt # [phone]

## 2023-12-13 LAB — BACTERIA UR CULT: NORMAL

## 2023-12-22 DIAGNOSIS — F41.9 ANXIETY: ICD-10-CM

## 2023-12-22 RX ORDER — ALPRAZOLAM 0.5 MG/1
TABLET ORAL
Qty: 30 TABLET | Refills: 0 | OUTPATIENT
Start: 2023-12-22 | End: 2024-03-21

## 2023-12-26 DIAGNOSIS — F41.9 ANXIETY: ICD-10-CM

## 2023-12-26 RX ORDER — ALPRAZOLAM 0.5 MG/1
TABLET ORAL
Qty: 30 TABLET | Refills: 0 | OUTPATIENT
Start: 2023-12-26 | End: 2024-03-25

## 2023-12-27 RX ORDER — ESTRADIOL 10 UG/1
INSERT VAGINAL
Qty: 30 TABLET | Refills: 6 | Status: SHIPPED | OUTPATIENT
Start: 2023-12-27

## 2024-01-03 ENCOUNTER — OFFICE VISIT (OUTPATIENT)
Dept: FAMILY MEDICINE CLINIC | Age: 74
End: 2024-01-03
Payer: MEDICARE

## 2024-01-03 ENCOUNTER — TELEPHONE (OUTPATIENT)
Dept: FAMILY MEDICINE CLINIC | Age: 74
End: 2024-01-03

## 2024-01-03 VITALS
DIASTOLIC BLOOD PRESSURE: 82 MMHG | HEIGHT: 65 IN | BODY MASS INDEX: 20.53 KG/M2 | SYSTOLIC BLOOD PRESSURE: 140 MMHG | TEMPERATURE: 97.6 F | WEIGHT: 123.2 LBS

## 2024-01-03 DIAGNOSIS — F51.04 CHRONIC INSOMNIA: Primary | ICD-10-CM

## 2024-01-03 DIAGNOSIS — J43.2 CENTRILOBULAR EMPHYSEMA (HCC): ICD-10-CM

## 2024-01-03 DIAGNOSIS — I25.118 ATHEROSCLEROTIC HEART DISEASE OF NATIVE CORONARY ARTERY WITH OTHER FORMS OF ANGINA PECTORIS (HCC): ICD-10-CM

## 2024-01-03 DIAGNOSIS — F41.9 ANXIETY: ICD-10-CM

## 2024-01-03 PROBLEM — R91.8 LUNG NODULES: Status: ACTIVE | Noted: 2023-11-30

## 2024-01-03 PROBLEM — M25.529 ARTHRALGIA OF ELBOW: Status: ACTIVE | Noted: 2024-01-03

## 2024-01-03 PROBLEM — H04.203 BILATERAL EPIPHORA: Status: ACTIVE | Noted: 2024-01-03

## 2024-01-03 PROCEDURE — 3079F DIAST BP 80-89 MM HG: CPT | Performed by: FAMILY MEDICINE

## 2024-01-03 PROCEDURE — 3077F SYST BP >= 140 MM HG: CPT | Performed by: FAMILY MEDICINE

## 2024-01-03 PROCEDURE — 99214 OFFICE O/P EST MOD 30 MIN: CPT | Performed by: FAMILY MEDICINE

## 2024-01-03 PROCEDURE — 1123F ACP DISCUSS/DSCN MKR DOCD: CPT | Performed by: FAMILY MEDICINE

## 2024-01-03 RX ORDER — ALPRAZOLAM 0.5 MG/1
0.5 TABLET ORAL DAILY
Qty: 15 TABLET | Refills: 5 | Status: SHIPPED | OUTPATIENT
Start: 2024-01-03 | End: 2024-01-03 | Stop reason: SDUPTHER

## 2024-01-03 RX ORDER — ALPRAZOLAM 0.5 MG/1
0.5 TABLET ORAL DAILY
Qty: 30 TABLET | Refills: 2 | Status: SHIPPED | OUTPATIENT
Start: 2024-01-03 | End: 2024-04-02

## 2024-01-03 ASSESSMENT — PATIENT HEALTH QUESTIONNAIRE - PHQ9
SUM OF ALL RESPONSES TO PHQ QUESTIONS 1-9: 0
SUM OF ALL RESPONSES TO PHQ9 QUESTIONS 1 & 2: 0
SUM OF ALL RESPONSES TO PHQ QUESTIONS 1-9: 0
1. LITTLE INTEREST OR PLEASURE IN DOING THINGS: 0
SUM OF ALL RESPONSES TO PHQ QUESTIONS 1-9: 0
SUM OF ALL RESPONSES TO PHQ QUESTIONS 1-9: 0
2. FEELING DOWN, DEPRESSED OR HOPELESS: 0

## 2024-01-03 NOTE — PROGRESS NOTES
(TESSALON) 200 MG capsule Take 1 capsule by mouth three times daily as needed for cough 30 capsule 1    mirabegron (MYRBETRIQ) 50 MG TB24 Take 1 tablet by mouth once daily 90 tablet 0    loratadine (EQ ALLERGY RELIEF) 10 MG tablet TAKE 1 TABLET BY MOUTH NIGHTLY AS NEEDED FOR ALLERGIES 30 tablet 5    citalopram (CELEXA) 40 MG tablet Take 1 tablet by mouth once daily 90 tablet 1    LIVALO 1 MG TABS tablet TAKE 1/2 (ONE-HALF) TABLET BY MOUTH NIGHTLY 30 tablet 3    albuterol (PROVENTIL) (2.5 MG/3ML) 0.083% nebulizer solution Take 3 mLs by nebulization every 6 hours as needed for Wheezing 120 each 1    guaiFENesin (MUCINEX) 600 MG extended release tablet Take 1 tablet by mouth 2 times daily 60 tablet 5    dilTIAZem (CARDIZEM) 60 MG tablet Take 1 tablet by mouth twice daily 180 tablet 2    Evolocumab (REPATHA) SOSY syringe Inject 1 mL into the skin every 14 days 2 mL 11    budesonide-formoterol (SYMBICORT) 160-4.5 MCG/ACT AERO Inhale 2 puffs by mouth twice daily 11 g 5    EQ MUCUS  MG extended release tablet Take 1 tablet by mouth twice daily 60 tablet 3    Cholecalciferol 50 MCG (2000 UT) CAPS  (Patient not taking: Reported on 10/9/2023)      meclofenamate (MECLOMEN) 100 MG capsule TAKE 1 CAPSULE BY MOUTH DAILY AS NEEDED 90 capsule 0    montelukast (SINGULAIR) 10 MG tablet Take 1 tablet by mouth nightly      fluticasone (FLONASE) 50 MCG/ACT nasal spray USE 1 SPRAY INTO EACH NOSTRIL ONCE DAILY 1 Bottle 2    aspirin 81 MG tablet Take 1 tablet by mouth daily With Food 30 tablet 3    levothyroxine (SYNTHROID) 50 MCG tablet Take 1 tablet by mouth daily On Tuesday and Thursday; Saturday  she takes 2 tabs.  On Monday,Wednesday,Friday she takes 2 1/2 tabs  3     No current facility-administered medications for this visit.       PMH, Surgical Hx, Family Hx, and Social Hx reviewed and updated.  Health Maintenance reviewed.    Objective  Vitals:    01/03/24 1525   BP: (!) 140/82   Temp: 97.6 °F (36.4 °C)   TempSrc: Temporal

## 2024-01-03 NOTE — TELEPHONE ENCOUNTER
PT was in today & saw Dr Hagan he wrote RX for 15 tab  ALPRAZolam (XANAX) 0.5 MG tablet     He told her that once she figured out what she had at home she could call  back to has qty changed    She is requesting that the qty be changed to 30 tab

## 2024-01-15 ENCOUNTER — OFFICE VISIT (OUTPATIENT)
Dept: OBGYN CLINIC | Age: 74
End: 2024-01-15
Payer: MEDICARE

## 2024-01-15 VITALS
HEART RATE: 80 BPM | SYSTOLIC BLOOD PRESSURE: 118 MMHG | BODY MASS INDEX: 20.16 KG/M2 | DIASTOLIC BLOOD PRESSURE: 64 MMHG | WEIGHT: 121 LBS | HEIGHT: 65 IN

## 2024-01-15 DIAGNOSIS — N39.41 URGENCY INCONTINENCE: Primary | ICD-10-CM

## 2024-01-15 PROCEDURE — 3074F SYST BP LT 130 MM HG: CPT | Performed by: OBSTETRICS & GYNECOLOGY

## 2024-01-15 PROCEDURE — 99213 OFFICE O/P EST LOW 20 MIN: CPT | Performed by: OBSTETRICS & GYNECOLOGY

## 2024-01-15 PROCEDURE — 1123F ACP DISCUSS/DSCN MKR DOCD: CPT | Performed by: OBSTETRICS & GYNECOLOGY

## 2024-01-15 PROCEDURE — 3078F DIAST BP <80 MM HG: CPT | Performed by: OBSTETRICS & GYNECOLOGY

## 2024-01-15 NOTE — PROGRESS NOTES
Medication FollowUp     Erlinda Alvarenga is a 73 y.o. year old female here todiscuss symptoms after use of medications prescribed last visit. Symptoms  urgency, frequency, incontinence, nocturia   .Medication/s prescribed mirabegron 25 mg  . Side effects reported : none. Mentions symptomsimproved : yes - patient had increased her dosage back to the 50 mg , says it was even better and helped her symptoms and wants refills for the original dose.     Vitals:  /64 (Site: Right Upper Arm, Position: Sitting, Cuff Size: Medium Adult)   Pulse 80   Ht 1.651 m (5' 5\")   Wt 54.9 kg (121 lb)   LMP 1995   BMI 20.14 kg/m²   Allergies:  Adhesive tape, Morphine, Amoxil [amoxicillin], Cefdinir, Fosamax [alendronate], Pantoprazole, Simvastatin, Zetia [ezetimibe], and Zocor [simvastatin - high dose]  Past Medical History:   Diagnosis Date    A-fib (HCC)     Anxiety     Asthma     Cancer (HCC) 2018    squamous cell- eye brow - CC    Cancer of the skin, basal cell     Chronic constipation     Colon polyps     COPD (chronic obstructive pulmonary disease) (HCC)     Cyst of nipple     Endometriosis     Fibromyalgia     Goiter     Goiter     Hyperlipidemia     Hypertension     Hypothyroidism     Osteoarthritis     Thyroid ca (HCC)     TMJ (dislocation of temporomandibular joint)     TMJ arthritis         Past Surgical History:   Procedure Laterality Date    COLONOSCOPY      DIAGNOSTIC CARDIAC CATH LAB PROCEDURE  2019    THYROIDECTOMY  2017    Thaddeus negron  -pradip    THYROIDECTOMY Left 2015     OB History          1    Para   1    Term   1            AB        Living             SAB        IAB        Ectopic        Molar        Multiple        Live Births                  Family History   Problem Relation Age of Onset    Heart Disease Mother 60        CABG    Cancer Mother 80        lung    High Blood Pressure Mother     Heart Disease Father 50    High Blood Pressure Maternal Grandmother     High Blood

## 2024-01-18 ENCOUNTER — OFFICE VISIT (OUTPATIENT)
Dept: FAMILY MEDICINE CLINIC | Age: 74
End: 2024-01-18

## 2024-01-18 VITALS
WEIGHT: 119 LBS | DIASTOLIC BLOOD PRESSURE: 78 MMHG | SYSTOLIC BLOOD PRESSURE: 118 MMHG | OXYGEN SATURATION: 99 % | HEART RATE: 73 BPM | TEMPERATURE: 97.5 F | HEIGHT: 65 IN | BODY MASS INDEX: 19.83 KG/M2

## 2024-01-18 DIAGNOSIS — J01.00 ACUTE MAXILLARY SINUSITIS, RECURRENCE NOT SPECIFIED: Primary | ICD-10-CM

## 2024-01-18 DIAGNOSIS — M89.9 LESION OF BONE OF THORACIC SPINE: ICD-10-CM

## 2024-01-18 DIAGNOSIS — I48.91 ATRIAL FIBRILLATION, UNSPECIFIED TYPE (HCC): ICD-10-CM

## 2024-01-18 DIAGNOSIS — I73.9 CLAUDICATION (HCC): ICD-10-CM

## 2024-01-18 RX ORDER — FLUCONAZOLE 150 MG/1
150 TABLET ORAL ONCE
Qty: 1 TABLET | Refills: 0 | Status: SHIPPED | OUTPATIENT
Start: 2024-01-18 | End: 2024-01-18

## 2024-01-18 RX ORDER — AZITHROMYCIN 250 MG/1
250 TABLET, FILM COATED ORAL SEE ADMIN INSTRUCTIONS
Qty: 6 TABLET | Refills: 0 | Status: SHIPPED | OUTPATIENT
Start: 2024-01-18 | End: 2024-01-23

## 2024-01-19 ENCOUNTER — TELEPHONE (OUTPATIENT)
Dept: FAMILY MEDICINE CLINIC | Age: 74
End: 2024-01-19

## 2024-01-19 NOTE — TELEPHONE ENCOUNTER
PT was in and saw provider yesterday- she was given an RX for Fluconazole 150 MG Oral Tablet (DIFLUCAN , (1 tab)   She wants to know when she should be taking this   She going to be having surgury soon- she wants to make sure she doesn't take it too soon

## 2024-01-22 ENCOUNTER — HOSPITAL ENCOUNTER (OUTPATIENT)
Dept: PREADMISSION TESTING | Age: 74
Discharge: HOME | End: 2024-01-26
Payer: MEDICARE

## 2024-01-22 VITALS
HEART RATE: 64 BPM | OXYGEN SATURATION: 98 % | BODY MASS INDEX: 19.99 KG/M2 | WEIGHT: 120 LBS | RESPIRATION RATE: 18 BRPM | HEIGHT: 65 IN | TEMPERATURE: 97.9 F | DIASTOLIC BLOOD PRESSURE: 75 MMHG | SYSTOLIC BLOOD PRESSURE: 133 MMHG

## 2024-01-22 RX ORDER — ALBUTEROL SULFATE 90 UG/1
2 AEROSOL, METERED RESPIRATORY (INHALATION) PRN
COMMUNITY

## 2024-01-24 ENCOUNTER — ANESTHESIA EVENT (OUTPATIENT)
Dept: OPERATING ROOM | Age: 74
End: 2024-01-24
Payer: MEDICARE

## 2024-01-24 ENCOUNTER — HOSPITAL ENCOUNTER (OUTPATIENT)
Dept: CT IMAGING | Age: 74
Discharge: HOME | End: 2024-01-26
Payer: MEDICARE

## 2024-01-24 DIAGNOSIS — M89.9 LESION OF BONE OF THORACIC SPINE: ICD-10-CM

## 2024-01-24 PROCEDURE — 72128 CT CHEST SPINE W/O DYE: CPT

## 2024-01-25 ENCOUNTER — HOSPITAL ENCOUNTER (OUTPATIENT)
Age: 74
Setting detail: OUTPATIENT SURGERY
Discharge: HOME | End: 2024-01-25
Attending: OBSTETRICS & GYNECOLOGY | Admitting: OBSTETRICS & GYNECOLOGY
Payer: MEDICARE

## 2024-01-25 ENCOUNTER — ANESTHESIA (OUTPATIENT)
Dept: OPERATING ROOM | Age: 74
End: 2024-01-25
Payer: MEDICARE

## 2024-01-25 VITALS
OXYGEN SATURATION: 94 % | DIASTOLIC BLOOD PRESSURE: 64 MMHG | SYSTOLIC BLOOD PRESSURE: 122 MMHG | BODY MASS INDEX: 19.99 KG/M2 | TEMPERATURE: 98.6 F | HEIGHT: 65 IN | RESPIRATION RATE: 16 BRPM | WEIGHT: 120 LBS | HEART RATE: 70 BPM

## 2024-01-25 DIAGNOSIS — G89.18 POSTOPERATIVE PAIN: Primary | ICD-10-CM

## 2024-01-25 DIAGNOSIS — N76.0 VESTIBULITIS OF VAGINA: ICD-10-CM

## 2024-01-25 DIAGNOSIS — N89.5 VAGINAL STENOSIS: ICD-10-CM

## 2024-01-25 PROCEDURE — 6360000002 HC RX W HCPCS: Performed by: OBSTETRICS & GYNECOLOGY

## 2024-01-25 PROCEDURE — 7100000000 HC PACU RECOVERY - FIRST 15 MIN: Performed by: OBSTETRICS & GYNECOLOGY

## 2024-01-25 PROCEDURE — 2709999900 HC NON-CHARGEABLE SUPPLY: Performed by: OBSTETRICS & GYNECOLOGY

## 2024-01-25 PROCEDURE — 7100000001 HC PACU RECOVERY - ADDTL 15 MIN: Performed by: OBSTETRICS & GYNECOLOGY

## 2024-01-25 PROCEDURE — 3700000001 HC ADD 15 MINUTES (ANESTHESIA): Performed by: OBSTETRICS & GYNECOLOGY

## 2024-01-25 PROCEDURE — 3600000014 HC SURGERY LEVEL 4 ADDTL 15MIN: Performed by: OBSTETRICS & GYNECOLOGY

## 2024-01-25 PROCEDURE — 2500000003 HC RX 250 WO HCPCS: Performed by: OBSTETRICS & GYNECOLOGY

## 2024-01-25 PROCEDURE — 7100000010 HC PHASE II RECOVERY - FIRST 15 MIN: Performed by: OBSTETRICS & GYNECOLOGY

## 2024-01-25 PROCEDURE — 6360000002 HC RX W HCPCS: Performed by: NURSE ANESTHETIST, CERTIFIED REGISTERED

## 2024-01-25 PROCEDURE — 7100000011 HC PHASE II RECOVERY - ADDTL 15 MIN: Performed by: OBSTETRICS & GYNECOLOGY

## 2024-01-25 PROCEDURE — 88302 TISSUE EXAM BY PATHOLOGIST: CPT

## 2024-01-25 PROCEDURE — 3700000000 HC ANESTHESIA ATTENDED CARE: Performed by: OBSTETRICS & GYNECOLOGY

## 2024-01-25 PROCEDURE — 3600000004 HC SURGERY LEVEL 4 BASE: Performed by: OBSTETRICS & GYNECOLOGY

## 2024-01-25 PROCEDURE — A4217 STERILE WATER/SALINE, 500 ML: HCPCS | Performed by: OBSTETRICS & GYNECOLOGY

## 2024-01-25 PROCEDURE — 2580000003 HC RX 258: Performed by: OBSTETRICS & GYNECOLOGY

## 2024-01-25 PROCEDURE — 6370000000 HC RX 637 (ALT 250 FOR IP)

## 2024-01-25 RX ORDER — SODIUM CHLORIDE 0.9 % (FLUSH) 0.9 %
5-40 SYRINGE (ML) INJECTION EVERY 12 HOURS SCHEDULED
Status: DISCONTINUED | OUTPATIENT
Start: 2024-01-25 | End: 2024-01-25 | Stop reason: HOSPADM

## 2024-01-25 RX ORDER — SODIUM CHLORIDE 0.9 % (FLUSH) 0.9 %
5-40 SYRINGE (ML) INJECTION PRN
Status: CANCELLED | OUTPATIENT
Start: 2024-01-25

## 2024-01-25 RX ORDER — SODIUM CHLORIDE 9 MG/ML
INJECTION, SOLUTION INTRAVENOUS PRN
Status: CANCELLED | OUTPATIENT
Start: 2024-01-25

## 2024-01-25 RX ORDER — HYDROMORPHONE HYDROCHLORIDE 1 MG/ML
1 INJECTION, SOLUTION INTRAMUSCULAR; INTRAVENOUS; SUBCUTANEOUS
Status: CANCELLED | OUTPATIENT
Start: 2024-01-25

## 2024-01-25 RX ORDER — GENTAMICIN SULFATE 80 MG/100ML
80 INJECTION, SOLUTION INTRAVENOUS
Status: COMPLETED | OUTPATIENT
Start: 2024-01-25 | End: 2024-01-25

## 2024-01-25 RX ORDER — SODIUM CHLORIDE 0.9 % (FLUSH) 0.9 %
5-40 SYRINGE (ML) INJECTION PRN
Status: DISCONTINUED | OUTPATIENT
Start: 2024-01-25 | End: 2024-01-25 | Stop reason: HOSPADM

## 2024-01-25 RX ORDER — ACETAMINOPHEN 500 MG
1000 TABLET ORAL EVERY 8 HOURS PRN
Status: CANCELLED | OUTPATIENT
Start: 2024-01-25

## 2024-01-25 RX ORDER — FENTANYL CITRATE 0.05 MG/ML
25 INJECTION, SOLUTION INTRAMUSCULAR; INTRAVENOUS EVERY 5 MIN PRN
Status: DISCONTINUED | OUTPATIENT
Start: 2024-01-25 | End: 2024-01-25 | Stop reason: HOSPADM

## 2024-01-25 RX ORDER — ONDANSETRON 2 MG/ML
4 INJECTION INTRAMUSCULAR; INTRAVENOUS
Status: DISCONTINUED | OUTPATIENT
Start: 2024-01-25 | End: 2024-01-25 | Stop reason: HOSPADM

## 2024-01-25 RX ORDER — METOCLOPRAMIDE HYDROCHLORIDE 5 MG/ML
10 INJECTION INTRAMUSCULAR; INTRAVENOUS
Status: DISCONTINUED | OUTPATIENT
Start: 2024-01-25 | End: 2024-01-25 | Stop reason: HOSPADM

## 2024-01-25 RX ORDER — IBUPROFEN 600 MG/1
600 TABLET ORAL EVERY 6 HOURS PRN
Qty: 60 TABLET | Refills: 1 | Status: SHIPPED | OUTPATIENT
Start: 2024-01-25

## 2024-01-25 RX ORDER — ACETAMINOPHEN 500 MG
1000 TABLET ORAL EVERY 6 HOURS PRN
Qty: 60 TABLET | Refills: 0 | Status: SHIPPED | OUTPATIENT
Start: 2024-01-25

## 2024-01-25 RX ORDER — OXYCODONE HYDROCHLORIDE 5 MG/1
10 TABLET ORAL EVERY 4 HOURS PRN
Status: CANCELLED | OUTPATIENT
Start: 2024-01-25

## 2024-01-25 RX ORDER — GLYCOPYRROLATE 0.2 MG/ML
INJECTION INTRAMUSCULAR; INTRAVENOUS PRN
Status: DISCONTINUED | OUTPATIENT
Start: 2024-01-25 | End: 2024-01-25 | Stop reason: SDUPTHER

## 2024-01-25 RX ORDER — MAGNESIUM HYDROXIDE 1200 MG/15ML
LIQUID ORAL CONTINUOUS PRN
Status: DISCONTINUED | OUTPATIENT
Start: 2024-01-25 | End: 2024-01-25 | Stop reason: HOSPADM

## 2024-01-25 RX ORDER — LABETALOL HYDROCHLORIDE 5 MG/ML
INJECTION, SOLUTION INTRAVENOUS
Status: DISCONTINUED
Start: 2024-01-25 | End: 2024-01-25 | Stop reason: HOSPADM

## 2024-01-25 RX ORDER — OXYCODONE HYDROCHLORIDE AND ACETAMINOPHEN 5; 325 MG/1; MG/1
2 TABLET ORAL NIGHTLY PRN
Qty: 10 TABLET | Refills: 0 | Status: SHIPPED | OUTPATIENT
Start: 2024-01-25 | End: 2024-01-30

## 2024-01-25 RX ORDER — LIDOCAINE HYDROCHLORIDE AND EPINEPHRINE 10; 10 MG/ML; UG/ML
INJECTION, SOLUTION INFILTRATION; PERINEURAL PRN
Status: DISCONTINUED | OUTPATIENT
Start: 2024-01-25 | End: 2024-01-25 | Stop reason: HOSPADM

## 2024-01-25 RX ORDER — SODIUM CHLORIDE 9 MG/ML
INJECTION, SOLUTION INTRAVENOUS PRN
Status: DISCONTINUED | OUTPATIENT
Start: 2024-01-25 | End: 2024-01-25 | Stop reason: HOSPADM

## 2024-01-25 RX ORDER — CLINDAMYCIN PHOSPHATE 900 MG/50ML
900 INJECTION, SOLUTION INTRAVENOUS
Status: COMPLETED | OUTPATIENT
Start: 2024-01-25 | End: 2024-01-25

## 2024-01-25 RX ORDER — SODIUM CHLORIDE 0.9 % (FLUSH) 0.9 %
5-40 SYRINGE (ML) INJECTION EVERY 12 HOURS SCHEDULED
Status: CANCELLED | OUTPATIENT
Start: 2024-01-25

## 2024-01-25 RX ORDER — ACETAMINOPHEN 325 MG/1
650 TABLET ORAL
Status: DISCONTINUED | OUTPATIENT
Start: 2024-01-25 | End: 2024-01-25 | Stop reason: HOSPADM

## 2024-01-25 RX ORDER — DEXAMETHASONE SODIUM PHOSPHATE 4 MG/ML
INJECTION, SOLUTION INTRA-ARTICULAR; INTRALESIONAL; INTRAMUSCULAR; INTRAVENOUS; SOFT TISSUE PRN
Status: DISCONTINUED | OUTPATIENT
Start: 2024-01-25 | End: 2024-01-25 | Stop reason: SDUPTHER

## 2024-01-25 RX ORDER — KETOROLAC TROMETHAMINE 30 MG/ML
30 INJECTION, SOLUTION INTRAMUSCULAR; INTRAVENOUS EVERY 6 HOURS
Status: DISCONTINUED | OUTPATIENT
Start: 2024-01-25 | End: 2024-01-25 | Stop reason: HOSPADM

## 2024-01-25 RX ORDER — OXYCODONE HYDROCHLORIDE 5 MG/1
5 TABLET ORAL
Status: DISCONTINUED | OUTPATIENT
Start: 2024-01-25 | End: 2024-01-25 | Stop reason: HOSPADM

## 2024-01-25 RX ORDER — SODIUM CHLORIDE, SODIUM LACTATE, POTASSIUM CHLORIDE, CALCIUM CHLORIDE 600; 310; 30; 20 MG/100ML; MG/100ML; MG/100ML; MG/100ML
INJECTION, SOLUTION INTRAVENOUS CONTINUOUS
Status: DISCONTINUED | OUTPATIENT
Start: 2024-01-25 | End: 2024-01-25 | Stop reason: HOSPADM

## 2024-01-25 RX ORDER — ONDANSETRON 2 MG/ML
4 INJECTION INTRAMUSCULAR; INTRAVENOUS EVERY 6 HOURS PRN
Status: CANCELLED | OUTPATIENT
Start: 2024-01-25

## 2024-01-25 RX ORDER — FAMOTIDINE 20 MG/1
20 TABLET, FILM COATED ORAL 2 TIMES DAILY
Status: CANCELLED | OUTPATIENT
Start: 2024-01-25

## 2024-01-25 RX ORDER — ONDANSETRON 2 MG/ML
INJECTION INTRAMUSCULAR; INTRAVENOUS PRN
Status: DISCONTINUED | OUTPATIENT
Start: 2024-01-25 | End: 2024-01-25 | Stop reason: SDUPTHER

## 2024-01-25 RX ORDER — PROPOFOL 10 MG/ML
INJECTION, EMULSION INTRAVENOUS PRN
Status: DISCONTINUED | OUTPATIENT
Start: 2024-01-25 | End: 2024-01-25 | Stop reason: SDUPTHER

## 2024-01-25 RX ORDER — MIDAZOLAM HYDROCHLORIDE 1 MG/ML
INJECTION INTRAMUSCULAR; INTRAVENOUS PRN
Status: DISCONTINUED | OUTPATIENT
Start: 2024-01-25 | End: 2024-01-25 | Stop reason: SDUPTHER

## 2024-01-25 RX ORDER — OXYCODONE HYDROCHLORIDE 5 MG/1
5 TABLET ORAL EVERY 4 HOURS PRN
Status: CANCELLED | OUTPATIENT
Start: 2024-01-25

## 2024-01-25 RX ORDER — FENTANYL CITRATE 50 UG/ML
INJECTION, SOLUTION INTRAMUSCULAR; INTRAVENOUS PRN
Status: DISCONTINUED | OUTPATIENT
Start: 2024-01-25 | End: 2024-01-25 | Stop reason: SDUPTHER

## 2024-01-25 RX ORDER — MAGNESIUM HYDROXIDE/ALUMINUM HYDROXICE/SIMETHICONE 120; 1200; 1200 MG/30ML; MG/30ML; MG/30ML
15 SUSPENSION ORAL EVERY 6 HOURS PRN
Status: CANCELLED | OUTPATIENT
Start: 2024-01-25

## 2024-01-25 RX ORDER — ONDANSETRON 4 MG/1
4 TABLET, ORALLY DISINTEGRATING ORAL EVERY 8 HOURS PRN
Status: CANCELLED | OUTPATIENT
Start: 2024-01-25

## 2024-01-25 RX ORDER — LIDOCAINE HYDROCHLORIDE 20 MG/ML
INJECTION, SOLUTION INTRAVENOUS PRN
Status: DISCONTINUED | OUTPATIENT
Start: 2024-01-25 | End: 2024-01-25 | Stop reason: SDUPTHER

## 2024-01-25 RX ADMIN — FENTANYL CITRATE 50 MCG: 50 INJECTION, SOLUTION INTRAMUSCULAR; INTRAVENOUS at 07:42

## 2024-01-25 RX ADMIN — PHENYLEPHRINE HYDROCHLORIDE 100 MCG: 10 INJECTION INTRAVENOUS at 08:13

## 2024-01-25 RX ADMIN — CLINDAMYCIN IN 5 PERCENT DEXTROSE 900 MG: 18 INJECTION, SOLUTION INTRAVENOUS at 07:58

## 2024-01-25 RX ADMIN — PHENYLEPHRINE HYDROCHLORIDE 100 MCG: 10 INJECTION INTRAVENOUS at 08:40

## 2024-01-25 RX ADMIN — SODIUM CHLORIDE, POTASSIUM CHLORIDE, SODIUM LACTATE AND CALCIUM CHLORIDE: 600; 310; 30; 20 INJECTION, SOLUTION INTRAVENOUS at 06:28

## 2024-01-25 RX ADMIN — LIDOCAINE HYDROCHLORIDE 60 MG: 20 INJECTION, SOLUTION INTRAVENOUS at 07:46

## 2024-01-25 RX ADMIN — DEXAMETHASONE SODIUM PHOSPHATE 4 MG: 4 INJECTION INTRA-ARTICULAR; INTRALESIONAL; INTRAMUSCULAR; INTRAVENOUS; SOFT TISSUE at 08:10

## 2024-01-25 RX ADMIN — PHENYLEPHRINE HYDROCHLORIDE 100 MCG: 10 INJECTION INTRAVENOUS at 07:56

## 2024-01-25 RX ADMIN — FENTANYL CITRATE 25 MCG: 50 INJECTION, SOLUTION INTRAMUSCULAR; INTRAVENOUS at 09:03

## 2024-01-25 RX ADMIN — ONDANSETRON 4 MG: 2 INJECTION INTRAMUSCULAR; INTRAVENOUS at 08:10

## 2024-01-25 RX ADMIN — KETOROLAC TROMETHAMINE 30 MG: 30 INJECTION, SOLUTION INTRAMUSCULAR at 10:56

## 2024-01-25 RX ADMIN — GENTAMICIN SULFATE 80 MG: 80 INJECTION, SOLUTION INTRAVENOUS at 07:30

## 2024-01-25 RX ADMIN — GLYCOPYRROLATE 0.1 MG: 0.2 INJECTION INTRAMUSCULAR; INTRAVENOUS at 08:16

## 2024-01-25 RX ADMIN — MIDAZOLAM HYDROCHLORIDE 2 MG: 1 INJECTION, SOLUTION INTRAMUSCULAR; INTRAVENOUS at 07:36

## 2024-01-25 RX ADMIN — PHENYLEPHRINE HYDROCHLORIDE 50 MCG: 10 INJECTION INTRAVENOUS at 07:54

## 2024-01-25 RX ADMIN — FENTANYL CITRATE 25 MCG: 50 INJECTION, SOLUTION INTRAMUSCULAR; INTRAVENOUS at 08:03

## 2024-01-25 RX ADMIN — GLYCOPYRROLATE 0.1 MG: 0.2 INJECTION INTRAMUSCULAR; INTRAVENOUS at 08:10

## 2024-01-25 RX ADMIN — PROPOFOL 80 MG: 10 INJECTION, EMULSION INTRAVENOUS at 07:46

## 2024-01-25 RX ADMIN — PHENYLEPHRINE HYDROCHLORIDE 100 MCG: 10 INJECTION INTRAVENOUS at 08:08

## 2024-01-25 RX ADMIN — PROPOFOL 50 MG: 10 INJECTION, EMULSION INTRAVENOUS at 07:48

## 2024-01-25 ASSESSMENT — PAIN DESCRIPTION - ORIENTATION: ORIENTATION: LOWER

## 2024-01-25 ASSESSMENT — PAIN SCALES - GENERAL
PAINLEVEL_OUTOF10: 0
PAINLEVEL_OUTOF10: 0
PAINLEVEL_OUTOF10: 7

## 2024-01-25 ASSESSMENT — PAIN DESCRIPTION - LOCATION: LOCATION: BACK

## 2024-01-25 ASSESSMENT — PAIN DESCRIPTION - FREQUENCY: FREQUENCY: CONTINUOUS

## 2024-01-25 ASSESSMENT — PAIN - FUNCTIONAL ASSESSMENT: PAIN_FUNCTIONAL_ASSESSMENT: 0-10

## 2024-01-25 ASSESSMENT — PAIN DESCRIPTION - PAIN TYPE: TYPE: CHRONIC PAIN

## 2024-01-25 ASSESSMENT — PAIN DESCRIPTION - DESCRIPTORS: DESCRIPTORS: ACHING;DULL

## 2024-01-25 NOTE — H&P
Erlinda Alvarenga is a 73 y.o. female who presents here today for complaints of Follow-up    Here for follow up on vaginal estrogen use. Doing well. Patient wants to resume the mirabegron 25 mg , mentions that it used to help her suprapubic pressure. I approved.   Patient wants to go ahead with vaginal introitoplasty due to severe vaginal stenosis causing significant pain and dyspareunia with intercourse, despite using the vaginal estrogen for 6 mnth now.   .        Vitals:  /84 (Site: Right Upper Arm, Position: Sitting, Cuff Size: Medium Adult)   Pulse 72   Ht 1.651 m (5' 5\")   Wt 54.9 kg (121 lb)   LMP 1995   BMI 20.14 kg/m²   Allergies:  Adhesive tape, Morphine, Amoxil [amoxicillin], Cefdinir, Fosamax [alendronate], Pantoprazole, Simvastatin, Zetia [ezetimibe], and Zocor [simvastatin - high dose]  Past Medical History        Past Medical History:   Diagnosis Date    A-fib (HCC)      Anxiety      Asthma      Cancer (HCC) 2018     squamous cell- eye brow - CC    Cancer of the skin, basal cell      Chronic constipation      Colon polyps      COPD (chronic obstructive pulmonary disease) (HCC)      Cyst of nipple      Endometriosis      Fibromyalgia      Goiter      Goiter      Hyperlipidemia      Hypertension      Hypothyroidism      Osteoarthritis      Thyroid ca (HCC)      TMJ (dislocation of temporomandibular joint)      TMJ arthritis           Past Surgical History         Past Surgical History:   Procedure Laterality Date    COLONOSCOPY        DIAGNOSTIC CARDIAC CATH LAB PROCEDURE   2019    THYROIDECTOMY   2017     Thaddeus negron  -cc    THYROIDECTOMY Left 2015         OB History            1    Para   1    Term   1            AB        Living               SAB        IAB        Ectopic        Molar        Multiple        Live Births                    Family History         Family History   Problem Relation Age of Onset    Heart Disease Mother 60         CABG    Cancer

## 2024-01-25 NOTE — PROGRESS NOTES
CLINICAL PHARMACY NOTE: MEDS TO BEDS    Total # of Prescriptions Filled: 3   The following medications were delivered to the patient:  Oxycodone-Apap 5-325 mg Tab  Ibuprofen 600 mg Tab  Acetaminophen 500 mg Tab    Additional Documentation:

## 2024-01-25 NOTE — ANESTHESIA POSTPROCEDURE EVALUATION
Department of Anesthesiology  Postprocedure Note    Patient: Erlinda Alvarenga  MRN: [id number]  YOB: 1950  Date of evaluation: 1/25/2024    Procedure Summary       Date: 01/25/24 Room / Location: [address]    Anesthesia Start: 0738 Anesthesia Stop:     Procedure: REVERSE INTROITOPLASTY VESTIBULECTOMY (Vagina ) Diagnosis:       Vaginal stenosis      Vestibulitis of vagina      (Vaginal stenosis [N89.5])      (Vestibulitis of vagina [N76.0])    Surgeons: Luna Lester MD Responsible Provider: Angela Rivera MD    Anesthesia Type: general ASA Status: 3            Anesthesia Type: No value filed.    Emily Phase I: Emily Score: 9    Emily Phase II:      Anesthesia Post Evaluation    Patient location during evaluation: PACU  Patient participation: complete - patient participated  Level of consciousness: awake and awake and alert  Pain score: 0  Airway patency: patent  Nausea & Vomiting: no nausea  Cardiovascular status: hemodynamically stable  Respiratory status: acceptable  Hydration status: euvolemic  Multimodal analgesia pain management approach  Pain management: adequate        No notable events documented.

## 2024-01-25 NOTE — PROGRESS NOTES
Assisted up to bathroom, gait steady, states \"I need to sit for a few minutes to try and urinate.\"  here.

## 2024-01-25 NOTE — DISCHARGE INSTRUCTIONS
AVOID CONSTIPATION PLEASE. USE STOOL SOFTENERS AROUND THE CLOCK   AVOID STRAINING   NO WATER IMMERSION   NO STRAINING   NO CARRYING ANYTHING > 5 LBS   NO INTERCOURSE   NOTHING VAGINAL : NO TAMPONS , NO DOUCHING , NO INTERCOURSE.

## 2024-01-25 NOTE — PROGRESS NOTES
Dr Lester in to see patient, noted moderate amount light red drainage noted on peripad, Spoke with patient and patient's , answered several questions from both.

## 2024-01-25 NOTE — PROGRESS NOTES
Unable to urinate at this time. Moderate amount light red noted on peripad and light red noted in toilet, fresh pad applied, back to cart, taking coffee denies pain.

## 2024-01-25 NOTE — FLOWSHEET NOTE
1124- Dr. Lester called and made aware of patient bleeding and soaking through pad, per Dr. Lester he will be down to assess patient-KGW  Electronically signed by Ceci Gomes RN on 1/25/2024 at 11:32 AM

## 2024-01-25 NOTE — ANESTHESIA PRE PROCEDURE
Department of Anesthesiology  Preprocedure Note       Name:  Erlinda Alvarenga   Age:  73 y.o.  :  1950                                          MRN:  [id number]         Date:  2024      Surgeon: Surgeon(s):  Luna Lester MD    Procedure: Procedure(s):  REVERSE INTROITOPLASTY VESTIBULECTOMY    Medications prior to admission:   Prior to Admission medications    Medication Sig Start Date End Date Taking? Authorizing Provider   albuterol sulfate HFA (VENTOLIN HFA) 108 (90 Base) MCG/ACT inhaler Inhale 2 puffs into the lungs as needed for Wheezing or Shortness of Breath    Provider, MD Siddharth   ALPRAZolam (XANAX) 0.5 MG tablet Take 1 tablet by mouth daily for 90 days. 1/3/24 4/2/24  Florentino Hagan DO   Estradiol (YUVAFEM) 10 MCG TABS vaginal tablet PLACE 1 TABLET VAGINALLY THREE TIMES A WEEK 23   Luna Lester MD   Azelastine HCl 137 MCG/SPRAY SOLN USE 2 SPRAY(S) IN EACH NOSTRIL TWICE DAILY AS DIRECTED 23   Mini Wiseman PA-C   benzonatate (TESSALON) 200 MG capsule Take 1 capsule by mouth three times daily as needed for cough 23   Mini Wiseman PA-C   mirabegron (MYRBETRIQ) 50 MG TB24 Take 1 tablet by mouth once daily 23   Luna Lester MD   loratadine (EQ ALLERGY RELIEF) 10 MG tablet TAKE 1 TABLET BY MOUTH NIGHTLY AS NEEDED FOR ALLERGIES 23   Mini Wiseman PA-C   citalopram (CELEXA) 40 MG tablet Take 1 tablet by mouth once daily 23   Mini Wiseman PA-C   LIVALO 1 MG TABS tablet TAKE 1/2 (ONE-HALF) TABLET BY MOUTH NIGHTLY 23   óGmez Robert MD   albuterol (PROVENTIL) (2.5 MG/3ML) 0.083% nebulizer solution Take 3 mLs by nebulization every 6 hours as needed for Wheezing  Patient taking differently: Take 3 mLs by nebulization every 6 hours as needed for Wheezing PRN 23   Kunal Joyce PA-C   dilTIAZem (CARDIZEM) 60 MG tablet Take 1 tablet by mouth twice daily 23   Gómez Robert MD   Evolocumab (REPATHA) SOSY syringe

## 2024-01-26 ENCOUNTER — TELEPHONE (OUTPATIENT)
Dept: OBGYN CLINIC | Age: 74
End: 2024-01-26

## 2024-01-26 NOTE — OP NOTE
Operative Note      Patient: Erlinda Alvarenga  YOB: 1950  MRN: [id number]    Date of Procedure: 1/25/2024    Pre-Op Diagnosis Codes:     * Vaginal stenosis [N89.5]     * Vestibulitis of vagina [N76.0]    Post-Op Diagnosis: Same       Procedure(s):  REVERSE INTROITOPLASTY VESTIBULECTOMY  Posterior colporrhaphy     Surgeon(s):  Luna Lester MD    Assistant:   First Assistant: Cammie Howell    Anesthesia: General    Estimated Blood Loss (mL): Minimal    Complications: None    Specimens:   ID Type Source Tests Collected by Time Destination   A : VAGINAL MUCOSA Tissue Vaginal SURGICAL PATHOLOGY Luna Lester MD 1/25/2024 0821        Implants:  * No implants in log *      Drains:   [REMOVED] Urinary Catheter 01/25/24 Garcia (Removed)   Catheter Indications Perioperative use for selected surgical procedures 01/25/24 0921   Urine Color Yellow 01/25/24 0921   Urine Appearance Clear 01/25/24 0921   Collection Container Standard 01/25/24 0921   Securement Method Securing device (Describe) 01/25/24 0921   Catheter Best Practices  Drainage tube clipped to bed;Catheter secured to thigh;Tamper seal intact;Bag below bladder;Bag not on floor;Lack of dependent loop in tubing;Drainage bag less than half full 01/25/24 0921   Status Patent;Draining 01/25/24 0921       Findings: rectocele repaired during procedure.         Detailed Description of Procedure:   Patient was taken to the operating room , after adequate general anaesthesia , she was placed in lithotomy position using yellow-fin stirrups. Attention then towards the vaginal area, significant introital narrowing noted , I injected 20 cc of 1% lidocaine with epinephrine into the posterior vaginal wall in the posterior forchette and introitus on the posterior aspect of vaginal opening . I used a 10 scalpel blade and removed a nikos shape portion of perineal skin between anal verge and posterior vaginal rim , also incorporated posterior vaginal mucosa as we

## 2024-01-26 NOTE — TELEPHONE ENCOUNTER
Patient would like to know after surgery, the Silvadene cream that she uses externally exactly where is she applying it too, she states that she has taken the stool softener and right now she is just having gas but no BM, she said that her  told her that she was supposed to use also use metamucil she would like to clarify that as well.

## 2024-01-30 ENCOUNTER — TELEPHONE (OUTPATIENT)
Dept: OBGYN CLINIC | Age: 74
End: 2024-01-30

## 2024-01-30 NOTE — TELEPHONE ENCOUNTER
Pt called back asking about the cream. She also has a bleeding hemorroid. She wanted to know if she has stitches back there. Wanted to know if it's ok to use wipes for the hemorriods

## 2024-01-31 DIAGNOSIS — J44.1 COPD WITH ACUTE EXACERBATION (HCC): ICD-10-CM

## 2024-01-31 RX ORDER — BENZONATATE 200 MG/1
CAPSULE ORAL
Qty: 30 CAPSULE | Refills: 1 | Status: SHIPPED | OUTPATIENT
Start: 2024-01-31

## 2024-01-31 RX ORDER — BUDESONIDE AND FORMOTEROL FUMARATE DIHYDRATE 160; 4.5 UG/1; UG/1
AEROSOL RESPIRATORY (INHALATION)
Qty: 11 G | Refills: 5 | Status: SHIPPED | OUTPATIENT
Start: 2024-01-31

## 2024-01-31 NOTE — TELEPHONE ENCOUNTER
Future Appointments    Encounter Information   Provider Department Appt Notes   2/7/2024 Luna Lester MD [facility] Obstetrics and Gynecology 2 wk po   4/8/2024 Gómez Robert MD [facility] Cardiology 6 month follow up   7/17/2024 Mini Wiseman PA-C [facility] 1 yr f/u AWV same day// sxc   7/17/2024 Mini Wiseman PA-C [facility] Medicare AWV (1yr ov same day) // sxc     Past Visits    Date Provider Specialty Visit Type Primary Dx   01/25/2024 Luna Lester MD IP Unit Surgery    01/18/2024 Mini Wiseman PA-C Family Medicine Office Visit Acute maxillary sinusitis, recurrence not specified

## 2024-02-07 ENCOUNTER — OFFICE VISIT (OUTPATIENT)
Dept: OBGYN CLINIC | Age: 74
End: 2024-02-07

## 2024-02-07 VITALS
HEIGHT: 65 IN | BODY MASS INDEX: 19.99 KG/M2 | WEIGHT: 120 LBS | HEART RATE: 84 BPM | SYSTOLIC BLOOD PRESSURE: 132 MMHG | DIASTOLIC BLOOD PRESSURE: 80 MMHG

## 2024-02-07 DIAGNOSIS — R30.0 DYSURIA: ICD-10-CM

## 2024-02-07 DIAGNOSIS — R30.0 DYSURIA: Primary | ICD-10-CM

## 2024-02-07 LAB
BACTERIA URNS QL MICRO: ABNORMAL /HPF
BILIRUB UR QL STRIP: NEGATIVE
CLARITY UR: CLEAR
COLOR UR: YELLOW
EPI CELLS #/AREA URNS HPF: ABNORMAL /HPF
GLUCOSE UR STRIP-MCNC: NEGATIVE MG/DL
HGB UR QL STRIP: ABNORMAL
HYALINE CASTS #/AREA URNS LPF: ABNORMAL /LPF (ref 0–5)
KETONES UR STRIP-MCNC: NEGATIVE MG/DL
LEUKOCYTE ESTERASE UR QL STRIP: ABNORMAL
NITRITE UR QL STRIP: NEGATIVE
PH UR STRIP: 6.5 [PH] (ref 5–9)
PROT UR STRIP-MCNC: NEGATIVE MG/DL
RBC #/AREA URNS HPF: ABNORMAL /HPF (ref 0–2)
SP GR UR STRIP: 1 (ref 1–1.03)
UROBILINOGEN UR STRIP-ACNC: 0.2 E.U./DL
WBC #/AREA URNS HPF: ABNORMAL /HPF (ref 0–5)

## 2024-02-07 PROCEDURE — 99024 POSTOP FOLLOW-UP VISIT: CPT | Performed by: OBSTETRICS & GYNECOLOGY

## 2024-02-07 RX ORDER — ESTRADIOL 0.1 MG/G
1 CREAM VAGINAL DAILY
Qty: 1 EACH | Refills: 1 | Status: SHIPPED | OUTPATIENT
Start: 2024-02-07

## 2024-02-07 RX ORDER — NITROFURANTOIN 25; 75 MG/1; MG/1
100 CAPSULE ORAL 2 TIMES DAILY
Qty: 10 CAPSULE | Refills: 0 | Status: SHIPPED | OUTPATIENT
Start: 2024-02-07 | End: 2024-02-12

## 2024-02-07 RX ORDER — HYDROCORTISONE 25 MG/G
CREAM TOPICAL
Qty: 1 EACH | Refills: 2 | Status: SHIPPED | OUTPATIENT
Start: 2024-02-07

## 2024-02-07 RX ORDER — FLUCONAZOLE 150 MG/1
TABLET ORAL
Qty: 3 TABLET | Refills: 0 | Status: SHIPPED | OUTPATIENT
Start: 2024-02-07

## 2024-02-09 ENCOUNTER — TELEPHONE (OUTPATIENT)
Dept: OBGYN CLINIC | Age: 74
End: 2024-02-09

## 2024-02-09 LAB — BACTERIA UR CULT: NORMAL

## 2024-02-09 NOTE — TELEPHONE ENCOUNTER
Patient seen in office for postop check 2/7/24. Urine was sent for UA and culture. Please review results.

## 2024-02-09 NOTE — TELEPHONE ENCOUNTER
Pt had questions on her lab results. Culture had no growth. Urinalysis did show blood pt was concerned.    Please reach out to her

## 2024-02-19 DIAGNOSIS — J30.9 ALLERGIC RHINITIS, UNSPECIFIED SEASONALITY, UNSPECIFIED TRIGGER: ICD-10-CM

## 2024-02-19 RX ORDER — LORATADINE 10 MG/1
10 TABLET ORAL DAILY
Qty: 30 TABLET | Refills: 5 | Status: SHIPPED | OUTPATIENT
Start: 2024-02-19

## 2024-02-19 NOTE — TELEPHONE ENCOUNTER
Future Appointments    Encounter Information   Provider Department Appt Notes   2/28/2024 Luna Lester MD [facility] Obstetrics and Gynecology 4 wk f/up   4/8/2024 Gómez Robert MD [facility] Cardiology 6 month follow up   7/17/2024 Mini iWseman PA-C [facility] 1 yr f/u AWV same day// sxc   7/17/2024 Mini Wiseman PA-C [facility] Medicare AWV (1yr ov same day) // sxc     Past Visits    Date Provider Specialty Visit Type Primary Dx   02/07/2024 Luna Lester MD Obstetrics and Gynecology Office Visit Dysuria   01/25/2024 Luna Lester MD IP Unit Surgery    01/18/2024 Mini Wiseman PA-C Family Medicine Office Visit Acute maxillary sinusitis, recurrence not specified   01/15/2024 Luna Lester MD Obstetrics and Gynecology Office Visit Urgency incontinence   01/03/2024 Florentino Hagan DO Family Medicine Office Visit Chronic insomni

## 2024-02-28 ENCOUNTER — OFFICE VISIT (OUTPATIENT)
Dept: OBGYN CLINIC | Age: 74
End: 2024-02-28

## 2024-02-28 VITALS
HEIGHT: 65 IN | HEART RATE: 84 BPM | DIASTOLIC BLOOD PRESSURE: 82 MMHG | BODY MASS INDEX: 20.16 KG/M2 | WEIGHT: 121 LBS | SYSTOLIC BLOOD PRESSURE: 138 MMHG

## 2024-02-28 DIAGNOSIS — Z09 POSTOP CHECK: Primary | ICD-10-CM

## 2024-02-28 PROCEDURE — 99024 POSTOP FOLLOW-UP VISIT: CPT | Performed by: OBSTETRICS & GYNECOLOGY

## 2024-02-28 NOTE — PROGRESS NOTES
Postop Progress Note    Subjective    presents to the office for postop follow up. 6 weeks    Objective    Vitals:    05/26/23 0852   BP: 106/64   Pulse: 76     General: alert, cooperative and no distress  Incision: healing well  Vag exam: vaginal incisions healing adequately    Assessment  Doing well postoperatively.    Plan    Continue postop restrictions including no heavy lifting or straining   No intercourse until next visit   Use stool softeners and pain meds   Treatment for hemorrhoids given   Stool softeners given   Possible urinary symptoms consistent with UTI given   Possible vaginal symptoms consistent with vaginal yeast - treatment given   Continue vaginal estrogen to promote healing.   Return in 4 weeks     No orders of the defined types were placed in this encounter.        Luna Lester M.D., F.A.C.O.G

## 2024-03-04 DIAGNOSIS — R30.0 DYSURIA: Primary | ICD-10-CM

## 2024-03-04 DIAGNOSIS — R30.0 DYSURIA: ICD-10-CM

## 2024-03-04 LAB
BILIRUB UR QL STRIP: NEGATIVE
CLARITY UR: CLEAR
COLOR UR: YELLOW
GLUCOSE UR STRIP-MCNC: NEGATIVE MG/DL
HGB UR QL STRIP: NEGATIVE
KETONES UR STRIP-MCNC: NEGATIVE MG/DL
LEUKOCYTE ESTERASE UR QL STRIP: NEGATIVE
NITRITE UR QL STRIP: NEGATIVE
PH UR STRIP: 6.5 [PH] (ref 5–9)
PROT UR STRIP-MCNC: NEGATIVE MG/DL
SP GR UR STRIP: 1.01 (ref 1–1.03)
UROBILINOGEN UR STRIP-ACNC: 0.2 E.U./DL

## 2024-03-05 LAB — BACTERIA UR CULT: NORMAL

## 2024-03-06 ENCOUNTER — TELEPHONE (OUTPATIENT)
Dept: FAMILY MEDICINE CLINIC | Age: 74
End: 2024-03-06

## 2024-03-06 DIAGNOSIS — Z12.31 SCREENING MAMMOGRAM, ENCOUNTER FOR: Primary | ICD-10-CM

## 2024-03-06 NOTE — TELEPHONE ENCOUNTER
Patient requesting mammogram referral to be placed to [facility] in [place], [address]    Referral must be after 3/26/24    Please call patient if this can be placed and if they will give her a call to schedule or if she needs to call to schedule.  [phone]      LOV 1/18/24

## 2024-03-16 DIAGNOSIS — I10 ESSENTIAL HYPERTENSION: ICD-10-CM

## 2024-03-17 RX ORDER — DILTIAZEM HYDROCHLORIDE 60 MG/1
TABLET, FILM COATED ORAL
Qty: 180 TABLET | Refills: 3 | Status: SHIPPED | OUTPATIENT
Start: 2024-03-17

## 2024-03-18 RX ORDER — CITALOPRAM 40 MG/1
TABLET ORAL
Qty: 90 TABLET | Refills: 2 | Status: SHIPPED | OUTPATIENT
Start: 2024-03-18

## 2024-03-18 NOTE — TELEPHONE ENCOUNTER
Pharmacy is requesting medication refill. Please approve or deny this request.    Rx requested:  Requested Prescriptions     Pending Prescriptions Disp Refills    citalopram (CELEXA) 40 MG tablet [Pharmacy Med Name: Citalopram Hydrobromide 40 MG Oral Tablet] 90 tablet 0     Sig: Take 1 tablet by mouth once daily         Last Office Visit:   1/18/2024      Next Visit Date:  Future Appointments   Date Time Provider Department Center   3/28/2024 11:00 AM [facility] MAMMO ROOM 1 [facility] RAD   4/8/2024 12:15 PM [facility]   4/16/2024  9:15 AM [facility]   7/17/2024 11:00 AM [facility] Wiseman [facility]C [facility]   7/17/2024 11:30 AM Mini Wiseman PA-C [facility]

## 2024-03-19 SDOH — ECONOMIC STABILITY: INCOME INSECURITY: HOW HARD IS IT FOR YOU TO PAY FOR THE VERY BASICS LIKE FOOD, HOUSING, MEDICAL CARE, AND HEATING?: NOT HARD AT ALL

## 2024-03-19 SDOH — ECONOMIC STABILITY: FOOD INSECURITY: WITHIN THE PAST 12 MONTHS, YOU WORRIED THAT YOUR FOOD WOULD RUN OUT BEFORE YOU GOT MONEY TO BUY MORE.: NEVER TRUE

## 2024-03-19 SDOH — ECONOMIC STABILITY: FOOD INSECURITY: WITHIN THE PAST 12 MONTHS, THE FOOD YOU BOUGHT JUST DIDN'T LAST AND YOU DIDN'T HAVE MONEY TO GET MORE.: NEVER TRUE

## 2024-03-19 SDOH — ECONOMIC STABILITY: TRANSPORTATION INSECURITY
IN THE PAST 12 MONTHS, HAS LACK OF TRANSPORTATION KEPT YOU FROM MEETINGS, WORK, OR FROM GETTING THINGS NEEDED FOR DAILY LIVING?: NO

## 2024-03-20 ENCOUNTER — OFFICE VISIT (OUTPATIENT)
Dept: FAMILY MEDICINE CLINIC | Age: 74
End: 2024-03-20
Payer: MEDICARE

## 2024-03-20 VITALS
HEART RATE: 83 BPM | OXYGEN SATURATION: 97 % | WEIGHT: 120 LBS | HEIGHT: 65 IN | TEMPERATURE: 97.8 F | BODY MASS INDEX: 19.99 KG/M2 | DIASTOLIC BLOOD PRESSURE: 82 MMHG | SYSTOLIC BLOOD PRESSURE: 122 MMHG

## 2024-03-20 DIAGNOSIS — F41.9 ANXIETY: ICD-10-CM

## 2024-03-20 DIAGNOSIS — F51.04 CHRONIC INSOMNIA: ICD-10-CM

## 2024-03-20 DIAGNOSIS — J44.9 CHRONIC OBSTRUCTIVE PULMONARY DISEASE, UNSPECIFIED COPD TYPE (HCC): ICD-10-CM

## 2024-03-20 DIAGNOSIS — J01.00 ACUTE MAXILLARY SINUSITIS, RECURRENCE NOT SPECIFIED: Primary | ICD-10-CM

## 2024-03-20 PROCEDURE — 3079F DIAST BP 80-89 MM HG: CPT | Performed by: PHYSICIAN ASSISTANT

## 2024-03-20 PROCEDURE — 3074F SYST BP LT 130 MM HG: CPT | Performed by: PHYSICIAN ASSISTANT

## 2024-03-20 PROCEDURE — 1123F ACP DISCUSS/DSCN MKR DOCD: CPT | Performed by: PHYSICIAN ASSISTANT

## 2024-03-20 PROCEDURE — 99213 OFFICE O/P EST LOW 20 MIN: CPT | Performed by: PHYSICIAN ASSISTANT

## 2024-03-20 RX ORDER — BENZONATATE 200 MG/1
200 CAPSULE ORAL 3 TIMES DAILY PRN
Qty: 30 CAPSULE | Refills: 1 | Status: SHIPPED | OUTPATIENT
Start: 2024-03-20

## 2024-03-20 RX ORDER — METHYLPREDNISOLONE 4 MG/1
TABLET ORAL
Qty: 1 KIT | Refills: 0 | Status: SHIPPED | OUTPATIENT
Start: 2024-03-20 | End: 2024-03-26

## 2024-03-20 RX ORDER — ALPRAZOLAM 0.5 MG/1
0.5 TABLET ORAL DAILY
Qty: 30 TABLET | Refills: 2 | Status: SHIPPED | OUTPATIENT
Start: 2024-04-04 | End: 2024-07-03

## 2024-03-20 RX ORDER — AZITHROMYCIN 250 MG/1
TABLET, FILM COATED ORAL
Qty: 6 TABLET | Refills: 0 | Status: SHIPPED | OUTPATIENT
Start: 2024-03-20 | End: 2024-03-30

## 2024-03-20 NOTE — PROGRESS NOTES
syringe Inject 1 mL into the skin every 14 days 2 mL 11    EQ MUCUS  MG extended release tablet Take 1 tablet by mouth twice daily 60 tablet 3    meclofenamate (MECLOMEN) 100 MG capsule TAKE 1 CAPSULE BY MOUTH DAILY AS NEEDED (Patient taking differently: TAKE 1 CAPSULE BY MOUTH DAILY AS NEEDED    PRN) 90 capsule 0    montelukast (SINGULAIR) 10 MG tablet Take 1 tablet by mouth nightly      fluticasone (FLONASE) 50 MCG/ACT nasal spray USE 1 SPRAY INTO EACH NOSTRIL ONCE DAILY 1 Bottle 2    aspirin 81 MG tablet Take 1 tablet by mouth daily With Food 30 tablet 3    levothyroxine (SYNTHROID) 50 MCG tablet Take 1 tablet by mouth daily On Tuesday and Thursday; Saturday  she takes 2 tabs.  On Monday,Wednesday,Friday she takes 2 1/2 tabs  3    azithromycin (ZITHROMAX) 250 MG tablet 500mg on day 1 followed by 250mg on days 2 - 5 6 tablet 0     No current facility-administered medications for this visit.       Objective    Vitals:    03/20/24 1230   BP: 122/82   Site: Left Upper Arm   Position: Sitting   Cuff Size: Medium Adult   Pulse: 83   Temp: 97.8 °F (36.6 °C)   TempSrc: Temporal   SpO2: 97%   Weight: 54.4 kg (120 lb)   Height: 1.651 m (5' 5\")     Physical Exam  Constitutional:       General: She is not in acute distress.     Appearance: Normal appearance. She is not ill-appearing.   HENT:      Head: Normocephalic.      Right Ear: External ear normal.      Nose: Congestion present.   Eyes:      Extraocular Movements: Extraocular movements intact.      Conjunctiva/sclera: Conjunctivae normal.      Pupils: Pupils are equal, round, and reactive to light.   Neck:      Thyroid: No thyromegaly.   Cardiovascular:      Rate and Rhythm: Normal rate and regular rhythm.      Heart sounds: Normal heart sounds. No murmur heard.  Pulmonary:      Effort: Pulmonary effort is normal. No respiratory distress.      Breath sounds: Normal breath sounds. No wheezing, rhonchi or rales.   Abdominal:      General: Bowel sounds are normal.

## 2024-03-23 LAB
6MAM UR QL: NOT DETECTED
7-AMINOCLONAZEPAM: NOT DETECTED
ALPHA-OH-ALPRAZOLAM: PRESENT
ALPHA-OH-MIDAZOLAM, URINE: NOT DETECTED
ALPRAZOLAM: NOT DETECTED
AMPHET UR QL SCN: NOT DETECTED
BARBITURATES: NEGATIVE
BENZOYLECGONINE: NEGATIVE
BUPRENORPHINE: NOT DETECTED
CARISOPRODOL UR QL: NEGATIVE
CLONAZEPAM UR QL: NOT DETECTED
CODEINE: NOT DETECTED
CREAT UR-MCNC: 27.9 MG/DL (ref 20–400)
DIAZEPAM: NOT DETECTED
ETHYL GLUCURONIDE: NEGATIVE
FENTANYL UR QL: NOT DETECTED
GABAPENTIN: NOT DETECTED
HYDROCODONE UR QL: NOT DETECTED
HYDROMORPHONE: NOT DETECTED
LORAZEPAM UR QL: NOT DETECTED
MARIJUANA METABOLITE: NEGATIVE
MDA: NOT DETECTED
MDEA: NOT DETECTED
MDMA UR QL: NOT DETECTED
MEPERIDINE: NOT DETECTED
METHADONE: NEGATIVE
METHAMPHETAMINE: NOT DETECTED
METHYLPHENIDATE: NOT DETECTED
MIDAZOLAM UR QL SCN: NOT DETECTED
MORPHINE: NOT DETECTED
NALOXONE: NOT DETECTED
NORBUPRENORPHINE, FREE: NOT DETECTED
NORDIAZEPAM: NOT DETECTED
NORFENTANYL: NOT DETECTED
NORHYDROCODONE, URINE: NOT DETECTED
NOROXYCODONE: NOT DETECTED
NOROXYMORPHONE, URINE: NOT DETECTED
OXAZEPAM UR QL: NOT DETECTED
OXYCODONE UR QL: NOT DETECTED
OXYMORPHONE UR QL: NOT DETECTED
PAIN MANAGEMENT DRUG PANEL: NORMAL
PATHOLOGY STUDY: NORMAL
PCP: NEGATIVE
PHENTERMINE: NOT DETECTED
PREGABALIN: NOT DETECTED
TAPENTADOL, URINE: NOT DETECTED
TAPENTADOL-O-SULFATE, URINE: NOT DETECTED
TEMAZEPAM: NOT DETECTED
TRAMADOL: NEGATIVE
ZOLPIDEM: NOT DETECTED

## 2024-03-25 ENCOUNTER — TELEPHONE (OUTPATIENT)
Dept: FAMILY MEDICINE CLINIC | Age: 74
End: 2024-03-25

## 2024-03-25 NOTE — TELEPHONE ENCOUNTER
Spoke to pt.    Pt received 2 prescriptions for azithromycin (ZITHROMAX) 250 MG tablet. Both were sent in on 3.20.24 along with a prescription for methylPREDNISolone (MEDROL DOSEPACK) 4 MG tablet.     Pt has not taken the Prednisone yet. Pt wanted to know if she should start another round of Zithromax? Should she start taking the Prednisone? Or should she start the second round of Zithromax and also take Prednisone?

## 2024-03-25 NOTE — TELEPHONE ENCOUNTER
Patient called stated she would like to know if she can start taking the 2nd dose of azithromycin as she still has a dry cough?

## 2024-03-28 ENCOUNTER — HOSPITAL ENCOUNTER (OUTPATIENT)
Dept: WOMENS IMAGING | Age: 74
Discharge: HOME | End: 2024-03-30
Payer: MEDICARE

## 2024-03-28 DIAGNOSIS — Z12.31 SCREENING MAMMOGRAM, ENCOUNTER FOR: ICD-10-CM

## 2024-03-28 PROCEDURE — 77063 BREAST TOMOSYNTHESIS BI: CPT

## 2024-04-04 NOTE — PROGRESS NOTES
Have removed 2 cc of air from TR band at regular intervals and removed TR band and a hematoma developed and manual pressure was held for about 5 minutes. Patient ambulated to the bathroom. Discharged

## 2024-04-11 ENCOUNTER — OFFICE VISIT (OUTPATIENT)
Dept: CARDIOLOGY CLINIC | Age: 74
End: 2024-04-11
Payer: MEDICARE

## 2024-04-11 VITALS
OXYGEN SATURATION: 96 % | DIASTOLIC BLOOD PRESSURE: 80 MMHG | BODY MASS INDEX: 20.33 KG/M2 | SYSTOLIC BLOOD PRESSURE: 128 MMHG | HEIGHT: 65 IN | HEART RATE: 73 BPM | WEIGHT: 122 LBS

## 2024-04-11 DIAGNOSIS — E78.2 MIXED HYPERLIPIDEMIA: ICD-10-CM

## 2024-04-11 DIAGNOSIS — I10 ESSENTIAL HYPERTENSION: Primary | ICD-10-CM

## 2024-04-11 DIAGNOSIS — E78.5 HYPERLIPIDEMIA, UNSPECIFIED HYPERLIPIDEMIA TYPE: ICD-10-CM

## 2024-04-11 DIAGNOSIS — I65.23 BILATERAL CAROTID ARTERY STENOSIS: ICD-10-CM

## 2024-04-11 DIAGNOSIS — I73.9 CLAUDICATION (HCC): ICD-10-CM

## 2024-04-11 PROCEDURE — 3079F DIAST BP 80-89 MM HG: CPT | Performed by: INTERNAL MEDICINE

## 2024-04-11 PROCEDURE — 3074F SYST BP LT 130 MM HG: CPT | Performed by: INTERNAL MEDICINE

## 2024-04-11 PROCEDURE — 1123F ACP DISCUSS/DSCN MKR DOCD: CPT | Performed by: INTERNAL MEDICINE

## 2024-04-11 PROCEDURE — 99214 OFFICE O/P EST MOD 30 MIN: CPT | Performed by: INTERNAL MEDICINE

## 2024-04-11 RX ORDER — BUDESONIDE AND FORMOTEROL FUMARATE DIHYDRATE 80; 4.5 UG/1; UG/1
2 AEROSOL RESPIRATORY (INHALATION) 2 TIMES DAILY
COMMUNITY

## 2024-04-11 RX ORDER — NITROFURANTOIN MACROCRYSTALS 100 MG/1
100 CAPSULE ORAL 4 TIMES DAILY
COMMUNITY

## 2024-04-11 ASSESSMENT — ENCOUNTER SYMPTOMS
SHORTNESS OF BREATH: 0
RESPIRATORY NEGATIVE: 1
NAUSEA: 0
COUGH: 0
BLOOD IN STOOL: 0
CHEST TIGHTNESS: 0
GASTROINTESTINAL NEGATIVE: 1
STRIDOR: 0
WHEEZING: 0
EYES NEGATIVE: 1

## 2024-04-11 NOTE — PROGRESS NOTES
tightness, shortness of breath, wheezing and stridor.    Cardiovascular: Negative.  Negative for chest pain, palpitations and leg swelling.   Gastrointestinal: Negative.  Negative for blood in stool and nausea.   Genitourinary: Negative.    Musculoskeletal: Negative.    Skin: Negative.    Neurological: Negative.  Negative for dizziness, syncope, weakness and light-headedness.   Hematological: Negative.    Psychiatric/Behavioral: Negative.           Physical Examination:    /80 (Site: Right Upper Arm, Position: Sitting, Cuff Size: Medium Adult)   Pulse 73   Ht 1.651 m (5' 5\")   Wt 55.3 kg (122 lb)   LMP 01/09/1995   SpO2 96%   BMI 20.30 kg/m²    Physical Exam   Constitutional: She appears healthy. No distress.   HENT:   Normal cephalic and Atraumatic   Eyes: Pupils are equal, round, and reactive to light.   Neck: Thyroid normal. No JVD present. No neck adenopathy. No thyromegaly present.   Cardiovascular: Normal rate, regular rhythm and intact distal pulses.   Murmur heard.  Pulses:       Carotid pulses are  on the right side with bruit and  on the left side with bruit.       Dorsalis pedis pulses are 1+ on the right side and 0 on the left side.        Posterior tibial pulses are 0 on the right side and 2+ on the left side.   Pulmonary/Chest: Effort normal. She has no rales. She has diffuse wheezes. She exhibits no tenderness.   Abdominal: Soft. Bowel sounds are normal. There is no abdominal tenderness.   Musculoskeletal:         General: No tenderness or edema. Normal range of motion.      Cervical back: Normal range of motion and neck supple.   Neurological: She is alert and oriented to person, place, and time.   Skin: Skin is warm. No cyanosis. Nails show no clubbing.       LABS:  CBC:   Lab Results   Component Value Date/Time    WBC 9.7 10/02/2023 12:16 AM    RBC 4.81 10/02/2023 12:16 AM    HGB 14.7 10/02/2023 12:16 AM    HCT 46.2 10/02/2023 12:16 AM    MCV 96.0 10/02/2023 12:16 AM    MCH 30.6

## 2024-04-18 ENCOUNTER — HOSPITAL ENCOUNTER (OUTPATIENT)
Dept: ULTRASOUND IMAGING | Age: 74
Discharge: HOME | End: 2024-04-20
Attending: INTERNAL MEDICINE
Payer: MEDICARE

## 2024-04-18 DIAGNOSIS — I65.23 BILATERAL CAROTID ARTERY STENOSIS: ICD-10-CM

## 2024-04-18 LAB
VAS LEFT CCA DIST EDV: 16.2 CM/S
VAS LEFT CCA DIST PSV: 59.5 CM/S
VAS LEFT CCA MID EDV: 29.8 CM/S
VAS LEFT CCA MID PSV: 104 CM/S
VAS LEFT CCA PROX EDV: 28 CM/S
VAS LEFT CCA PROX PSV: 103 CM/S
VAS LEFT ECA EDV: 17.2 CM/S
VAS LEFT ECA PSV: 65.4 CM/S
VAS LEFT ICA DIST EDV: 37.3 CM/S
VAS LEFT ICA DIST PSV: 106 CM/S
VAS LEFT ICA MID EDV: 29.5 CM/S
VAS LEFT ICA MID PSV: 77.2 CM/S
VAS LEFT ICA PROX EDV: 27.5 CM/S
VAS LEFT ICA PROX PSV: 86 CM/S
VAS LEFT ICA/CCA PSV: 1.02
VAS LEFT VERTEBRAL EDV: 13.7 CM/S
VAS LEFT VERTEBRAL PSV: 55.4 CM/S
VAS RIGHT CCA DIST EDV: 20.2 CM/S
VAS RIGHT CCA DIST PSV: 70.7 CM/S
VAS RIGHT CCA MID EDV: 20.6 CM/S
VAS RIGHT CCA MID PSV: 74.2 CM/S
VAS RIGHT CCA PROX EDV: 19.8 CM/S
VAS RIGHT CCA PROX PSV: 79.5 CM/S
VAS RIGHT ECA EDV: 18.9 CM/S
VAS RIGHT ECA PSV: 75.5 CM/S
VAS RIGHT ICA DIST EDV: 34.6 CM/S
VAS RIGHT ICA DIST PSV: 95.5 CM/S
VAS RIGHT ICA MID EDV: 31.8 CM/S
VAS RIGHT ICA MID PSV: 103 CM/S
VAS RIGHT ICA PROX EDV: 23.6 CM/S
VAS RIGHT ICA PROX PSV: 91.9 CM/S
VAS RIGHT ICA/CCA PSV: 1.39
VAS RIGHT VERTEBRAL EDV: 9.09 CM/S
VAS RIGHT VERTEBRAL PSV: 37.3 CM/S

## 2024-04-18 PROCEDURE — 93880 EXTRACRANIAL BILAT STUDY: CPT | Performed by: INTERNAL MEDICINE

## 2024-04-18 PROCEDURE — 93880 EXTRACRANIAL BILAT STUDY: CPT

## 2024-04-22 ENCOUNTER — OFFICE VISIT (OUTPATIENT)
Dept: OBGYN CLINIC | Age: 74
End: 2024-04-22

## 2024-04-22 VITALS
HEIGHT: 65 IN | HEART RATE: 62 BPM | BODY MASS INDEX: 20.49 KG/M2 | WEIGHT: 123 LBS | DIASTOLIC BLOOD PRESSURE: 64 MMHG | SYSTOLIC BLOOD PRESSURE: 114 MMHG

## 2024-04-22 DIAGNOSIS — Z09 POSTOP CHECK: Primary | ICD-10-CM

## 2024-04-22 DIAGNOSIS — Z09 POSTOP CHECK: ICD-10-CM

## 2024-04-22 LAB
BILIRUB UR QL STRIP: NEGATIVE
CLARITY UR: CLEAR
COLOR UR: YELLOW
GLUCOSE UR STRIP-MCNC: NEGATIVE MG/DL
HGB UR QL STRIP: NEGATIVE
KETONES UR STRIP-MCNC: NEGATIVE MG/DL
LEUKOCYTE ESTERASE UR QL STRIP: NEGATIVE
NITRITE UR QL STRIP: NEGATIVE
PH UR STRIP: 7 [PH] (ref 5–9)
PROT UR STRIP-MCNC: NEGATIVE MG/DL
SP GR UR STRIP: 1 (ref 1–1.03)
UROBILINOGEN UR STRIP-ACNC: 0.2 E.U./DL

## 2024-04-22 PROCEDURE — 99024 POSTOP FOLLOW-UP VISIT: CPT | Performed by: OBSTETRICS & GYNECOLOGY

## 2024-04-23 LAB — BACTERIA UR CULT: NORMAL

## 2024-04-29 DIAGNOSIS — I10 ESSENTIAL HYPERTENSION: ICD-10-CM

## 2024-04-29 DIAGNOSIS — E78.2 MIXED HYPERLIPIDEMIA: ICD-10-CM

## 2024-04-30 RX ORDER — PITAVASTATIN CALCIUM 1.04 MG/1
TABLET, FILM COATED ORAL
Qty: 90 TABLET | Refills: 3 | Status: SHIPPED | OUTPATIENT
Start: 2024-04-30

## 2024-04-30 NOTE — TELEPHONE ENCOUNTER
Requesting medication refill. Please approve or deny this request.    Rx requested:  Requested Prescriptions     Pending Prescriptions Disp Refills    LIVALO 1 MG TABS tablet [Pharmacy Med Name: Livalo 1 MG Oral Tablet] 30 tablet 0     Sig: TAKE 1/2 (ONE-HALF) TABLET BY MOUTH NIGHTLY         Last Office Visit:   4/11/2024      Next Visit Date:  Future Appointments   Date Time Provider Department Center   7/17/2024 11:30 AM Mini Wiseman PA-C [facility]   9/18/2024 11:30 AM Mini Wiseman PA-C [facility]   10/14/2024 12:30 PM Gómez Robert MD [facility] [place]               Last refill 08/24/2023. Please approve or deny.

## 2024-05-13 DIAGNOSIS — E78.5 HYPERLIPIDEMIA, UNSPECIFIED HYPERLIPIDEMIA TYPE: ICD-10-CM

## 2024-05-13 DIAGNOSIS — E78.2 MIXED HYPERLIPIDEMIA: ICD-10-CM

## 2024-05-13 RX ORDER — EVOLOCUMAB 140 MG/ML
INJECTION, SOLUTION SUBCUTANEOUS
Qty: 2 ML | Refills: 5 | Status: SHIPPED | OUTPATIENT
Start: 2024-05-13

## 2024-05-13 NOTE — TELEPHONE ENCOUNTER
Requesting medication refill. Please approve or deny this request.    Rx requested:  Requested Prescriptions     Pending Prescriptions Disp Refills    REPATHA SOSY syringe [Pharmacy Med Name: Repatha 140 MG/ML Subcutaneous Solution Prefilled Syringe] 2 mL 0     Sig: INJECT 1 ML SUBCUTANEOUSLY EVERY 14 DAYS         Last Office Visit:   4/11/2024      Next Visit Date:  Future Appointments   Date Time Provider Department Center   7/17/2024 11:30 AM Mini Wiseman PA-C [facility]   9/18/2024 11:30 AM Mini Wiseman PA-C [facility]   10/14/2024 12:30 PM Gómez Robert MD [facility] [place]               Last refill 05/12/2023. Please approve or deny.

## 2024-05-17 RX ORDER — BENZONATATE 200 MG/1
200 CAPSULE ORAL 3 TIMES DAILY PRN
Qty: 30 CAPSULE | Refills: 0 | Status: SHIPPED | OUTPATIENT
Start: 2024-05-17

## 2024-05-17 NOTE — TELEPHONE ENCOUNTER
Please approve or deny request. Thank you!    Rx requested:  Requested Prescriptions     Pending Prescriptions Disp Refills    benzonatate (TESSALON) 200 MG capsule [Pharmacy Med Name: Benzonatate 200 MG Oral Capsule] 30 capsule 0     Sig: Take 1 capsule by mouth three times daily as needed for cough         Last Office Visit:   3/20/2024      Next Visit Date:  Future Appointments   Date Time Provider Department Center   7/17/2024 11:30 AM Mini Wiseman PA-C [facility]   9/18/2024 11:30 AM Mini Wiseman PA-C [facility]   10/14/2024 12:30 PM Gómez Robert MD [facility]

## 2024-06-18 RX ORDER — BENZONATATE 200 MG/1
200 CAPSULE ORAL 3 TIMES DAILY PRN
Qty: 30 CAPSULE | Refills: 0 | Status: SHIPPED | OUTPATIENT
Start: 2024-06-18

## 2024-06-18 NOTE — TELEPHONE ENCOUNTER
Future Appointments    Encounter Information   Provider Department Appt Notes   7/17/2024 Mini Wiseman PA-C [facility] Medicare AWV (1yr ov same day) // sxc   9/18/2024 Mini Wiseman PA-C [facility] 6 mo f/u // sxc   10/14/2024 Gómez Robert MD [facility] Cardiology 6 month follow up     Past Visits    Date Provider Specialty Visit Type Primary Dx   04/22/2024 Luna Lester MD Obstetrics and Gynecology Office Visit Postop check   04/11/2024 Gómez Robert MD Cardiology Office Visit Essential hypertension   03/20/2024 Mini Wiseman PA-C Family Medicine

## 2024-06-29 DIAGNOSIS — F51.04 CHRONIC INSOMNIA: ICD-10-CM

## 2024-06-29 DIAGNOSIS — F41.9 ANXIETY: ICD-10-CM

## 2024-07-01 DIAGNOSIS — J44.1 COPD WITH ACUTE EXACERBATION (HCC): ICD-10-CM

## 2024-07-01 RX ORDER — GUAIFENESIN 600 MG/1
TABLET, EXTENDED RELEASE ORAL 2 TIMES DAILY
Qty: 60 TABLET | Refills: 3 | Status: SHIPPED | OUTPATIENT
Start: 2024-07-01

## 2024-07-01 RX ORDER — ALPRAZOLAM 0.5 MG/1
TABLET ORAL
Qty: 30 TABLET | Refills: 2 | Status: SHIPPED | OUTPATIENT
Start: 2024-07-01 | End: 2024-09-16

## 2024-07-01 NOTE — TELEPHONE ENCOUNTER
Future Appointments    Encounter Information   Provider Department Appt Notes   7/17/2024 Mini Wiseman PA-C [facility] Medicare AWV (1yr ov same day) // sxc   9/18/2024 Mini Wiseman PA-C [facility] 6 mo f/u // sxc   10/14/2024 Gómez Robert MD [facility] Cardiology 6 month follow up     Past Visits    Date Provider Specialty Visit Type Primary Dx   04/22/2024 Luna Lester MD Obstetrics and Gynecology Office Visit Postop check   04/11/2024 Gómez Robert MD Cardiology Office Visit Essential hypertension   03/20/2024 Mini Wiseman PA-C Family Medicine Office Visit Acute maxillary sinusitis, recurrence not specified   02/28/2024 Luna Lester MD Obstetrics and Gynecology Office Visit Postop check   02/07/2024 Luna Lester MD Obstetrics and Gynecology Office Visit Dysuria

## 2024-07-01 NOTE — TELEPHONE ENCOUNTER
Future Appointments    Encounter Information   Provider Department Appt Notes   7/17/2024 Mini Wiseman PA-C [facility] Medicare AWV (1yr ov same day) // sxc   9/18/2024 Mini Wiseman PA-C [facility] 6 mo f/u // sxc   10/14/2024 Gómez Robert MD [facility] Cardiology 6 month follow up     Past Visits    Date Provider Specialty Visit Type Primary Dx   04/22/2024 Luna Lester MD Obstetrics and Gynecology Office Visit Postop check   04/11/2024 Gómez Robert MD Cardiology Office Visit Essential hypertension   03/20/2024 Mini Wiseman PA-C Family Medicine Office Visit Acute maxillary sinusitis, recurrence not specified   02/28/2024 Luna Lester MD Obstetrics and Gynecology Office Visit Postop check   02/07/2024 Luna Lester MD Obstetrics and Gynecology Office Visit Dysuria

## 2024-07-15 SDOH — HEALTH STABILITY: PHYSICAL HEALTH: ON AVERAGE, HOW MANY MINUTES DO YOU ENGAGE IN EXERCISE AT THIS LEVEL?: 20 MIN

## 2024-07-15 SDOH — HEALTH STABILITY: PHYSICAL HEALTH: ON AVERAGE, HOW MANY DAYS PER WEEK DO YOU ENGAGE IN MODERATE TO STRENUOUS EXERCISE (LIKE A BRISK WALK)?: 3 DAYS

## 2024-07-15 ASSESSMENT — PATIENT HEALTH QUESTIONNAIRE - PHQ9
SUM OF ALL RESPONSES TO PHQ QUESTIONS 1-9: 0
SUM OF ALL RESPONSES TO PHQ QUESTIONS 1-9: 0
1. LITTLE INTEREST OR PLEASURE IN DOING THINGS: NOT AT ALL
2. FEELING DOWN, DEPRESSED OR HOPELESS: NOT AT ALL
SUM OF ALL RESPONSES TO PHQ9 QUESTIONS 1 & 2: 0
SUM OF ALL RESPONSES TO PHQ QUESTIONS 1-9: 0
SUM OF ALL RESPONSES TO PHQ QUESTIONS 1-9: 0

## 2024-07-15 ASSESSMENT — LIFESTYLE VARIABLES
HOW OFTEN DO YOU HAVE A DRINK CONTAINING ALCOHOL: MONTHLY OR LESS
HOW MANY STANDARD DRINKS CONTAINING ALCOHOL DO YOU HAVE ON A TYPICAL DAY: 1
HOW OFTEN DO YOU HAVE A DRINK CONTAINING ALCOHOL: 2
HOW OFTEN DO YOU HAVE SIX OR MORE DRINKS ON ONE OCCASION: 1
HOW MANY STANDARD DRINKS CONTAINING ALCOHOL DO YOU HAVE ON A TYPICAL DAY: 1 OR 2

## 2024-07-17 ENCOUNTER — TELEMEDICINE (OUTPATIENT)
Dept: FAMILY MEDICINE CLINIC | Age: 74
End: 2024-07-17

## 2024-07-17 DIAGNOSIS — Z00.00 MEDICARE ANNUAL WELLNESS VISIT, SUBSEQUENT: ICD-10-CM

## 2024-07-17 DIAGNOSIS — Z00.00 ENCOUNTER FOR ANNUAL WELLNESS VISIT (AWV) IN MEDICARE PATIENT: Primary | ICD-10-CM

## 2024-07-17 NOTE — PROGRESS NOTES
Erlinda Alvarenga, was evaluated through a synchronous (real-time) audio-video encounter. The patient (or guardian if applicable) is aware that this is a billable service, which includes applicable co-pays. This Virtual Visit was conducted with patient's (and/or legal guardian's) consent. Patient identification was verified, and a caregiver was present when appropriate.   The patient was located at Home: [address] [place] [address]  Provider was located at Facility (Appt Dept): [address]  [place]  [place] [address]  Confirm you are appropriately licensed, registered, or certified to deliver care in the state where the patient is located as indicated above. If you are not or unsure, please re-schedule the visit: Yes, I confirm.     Erlinda Alvarenga (:  1950) is a Established patient, presenting virtually for evaluation of the following:    Assessment & Plan   Below is the assessment and plan developed based on review of pertinent history, physical exam, labs, studies, and medications.    Return in 1 year (on 2025) for Medicare AWV.       Subjective   HPI  Doxy video awv visit  Review of Systems   All other systems reviewed and are negative.         Objective   Patient-Reported Vitals  No data recorded     Physical Exam  HENT:      Head: Normocephalic.   Eyes:      Extraocular Movements: Extraocular movements intact.      Conjunctiva/sclera: Conjunctivae normal.   Pulmonary:      Effort: Pulmonary effort is normal.   Neurological:      Mental Status: She is oriented to person, place, and time.   Psychiatric:         Mood and Affect: Mood normal.                 Assessment & Plan    Diagnosis Orders   1. Encounter for annual wellness visit (AWV) in Medicare patient        2. Medicare annual wellness visit, subsequent              No orders of the defined types were placed in this encounter.    No orders of the defined types were placed in this encounter.    There are no discontinued

## 2024-07-19 RX ORDER — BENZONATATE 200 MG/1
200 CAPSULE ORAL 3 TIMES DAILY PRN
Qty: 30 CAPSULE | Refills: 1 | Status: SHIPPED | OUTPATIENT
Start: 2024-07-19

## 2024-07-19 NOTE — TELEPHONE ENCOUNTER
Please approve or deny request. Thank you!    Rx requested:  Requested Prescriptions     Pending Prescriptions Disp Refills    benzonatate (TESSALON) 200 MG capsule [Pharmacy Med Name: Benzonatate 200 MG Oral Capsule] 30 capsule 0     Sig: Take 1 capsule by mouth three times daily as needed for cough         Last Office Visit:   7/17/2024      Next Visit Date:  Future Appointments   Date Time Provider Department Center   9/18/2024 11:30 AM Mini Wiseman PA-C [facility]   10/14/2024 12:30 PM Gómez Robert MD [facility]

## 2024-07-23 NOTE — PROGRESS NOTES
Postop Progress Note    Subjective    presents to the office for postop follow up. 10 weeks    Objective    Vitals:    05/26/23 0852   BP: 106/64   Pulse: 76     General: alert, cooperative and no distress  Incision: healing well  Vag exam: vaginal incisions healed    Assessment  Doing well postoperatively.    Plan    Return to normal activity     No orders of the defined types were placed in this encounter.        Luna Lester M.D., FJAMEY.C.O.G   
83

## 2024-08-09 ENCOUNTER — OFFICE VISIT (OUTPATIENT)
Dept: FAMILY MEDICINE CLINIC | Age: 74
End: 2024-08-09

## 2024-08-09 VITALS
TEMPERATURE: 97.3 F | DIASTOLIC BLOOD PRESSURE: 70 MMHG | BODY MASS INDEX: 20.49 KG/M2 | HEIGHT: 65 IN | HEART RATE: 65 BPM | SYSTOLIC BLOOD PRESSURE: 136 MMHG | WEIGHT: 123 LBS | RESPIRATION RATE: 12 BRPM | OXYGEN SATURATION: 96 %

## 2024-08-09 DIAGNOSIS — K13.79 ORAL SOFT TISSUE COMPLAINT: Primary | ICD-10-CM

## 2024-08-09 RX ORDER — ZINC OXIDE 13 %
1 CREAM (GRAM) TOPICAL DAILY
Qty: 30 CAPSULE | Refills: 0 | Status: SHIPPED | OUTPATIENT
Start: 2024-08-09

## 2024-08-09 RX ORDER — CLINDAMYCIN HYDROCHLORIDE 300 MG/1
300 CAPSULE ORAL 3 TIMES DAILY
Qty: 21 CAPSULE | Refills: 0 | Status: SHIPPED | OUTPATIENT
Start: 2024-08-09 | End: 2024-08-16

## 2024-08-09 ASSESSMENT — ENCOUNTER SYMPTOMS
FACIAL SWELLING: 1
VOMITING: 0
NAUSEA: 0
COLOR CHANGE: 0

## 2024-08-09 NOTE — PROGRESS NOTES
Subjective:      Patient ID: Erlinda Alvarenga is a 73 y.o. female who presents today for:  Chief Complaint   Patient presents with   • Facial Swelling     Patient present today with her right side of her gum area, lower eye and cheek is swollen and tender to the touch with sinus congestion. She is having pain to her left upper part of her gum area as well.  She said that she coughed up yellowish phlegm and believes that it may be sinus affected since yesterday. She did had dental work on her lower right side of her tooth.        HPI  Patient is here with c/o oral pain to the right face from tooth. Reports right cheek swollen and tender.   She has recently had dental work.   She thinks it may be her sinuses.   Says she has no fever or pain.   Painful to chew.   Says she is not currently on any antibiotics.   Past Medical History:   Diagnosis Date   • A-fib ([facility])    • Anxiety    • Asthma    • Cancer ([facility]) 2018    squamous cell- eye brow - CC   • Cancer of the skin, basal cell    • Chronic constipation    • Colon polyps    • COPD (chronic obstructive pulmonary disease) ([facility])    • Cyst of nipple    • Endometriosis    • Fibromyalgia    • Goiter    • Goiter    • Hyperlipidemia    • Hypertension    • Hypothyroidism    • Osteoarthritis    • Squamous cell carcinoma in situ (SCCIS) of skin of right lower leg 01/2024   • TMJ (dislocation of temporomandibular joint)    • TMJ arthritis      Past Surgical History:   Procedure Laterality Date   • COLONOSCOPY     • DIAGNOSTIC CARDIAC CATH LAB PROCEDURE  04/18/2019   • THYROIDECTOMY  2017    [facility]   • THYROIDECTOMY Left 01/06/2015   • VAGINA SURGERY N/A 1/25/2024    REVERSE INTROITOPLASTY VESTIBULECTOMY performed by Luna Lester MD at [facility] OR     Social History     Socioeconomic History   • Marital status:      Spouse name: Not on file   • Number of children: Not on file   • Years of education: Not on file   • Highest education level: Not on file   Occupational

## 2024-08-14 DIAGNOSIS — J30.9 ALLERGIC RHINITIS, UNSPECIFIED SEASONALITY, UNSPECIFIED TRIGGER: ICD-10-CM

## 2024-08-14 RX ORDER — LORATADINE 10 MG/1
10 TABLET ORAL DAILY
Qty: 30 TABLET | Refills: 5 | Status: SHIPPED | OUTPATIENT
Start: 2024-08-14

## 2024-08-14 RX ORDER — MIRABEGRON 50 MG/1
TABLET, EXTENDED RELEASE ORAL
Qty: 90 TABLET | Refills: 0 | Status: SHIPPED | OUTPATIENT
Start: 2024-08-14

## 2024-08-14 NOTE — TELEPHONE ENCOUNTER
Patient is requesting medication refill. Please approve or deny this request.    Rx requested:  Requested Prescriptions     Pending Prescriptions Disp Refills    EQ ALL DAY ALLERGY RELIEF 10 MG tablet [Pharmacy Med Name: EQ All Day Allergy Relief 10 MG Oral Tablet] 30 tablet 0     Sig: Take 1 tablet by mouth once daily         Last Office Visit:   7/17/2024      Next Visit Date:  Future Appointments   Date Time Provider Department Center   9/18/2024 11:30 AM Mini Wiseman PA-C [facility]   10/14/2024 12:30 PM Gómez Robert MD [facility]

## 2024-08-27 ENCOUNTER — APPOINTMENT (OUTPATIENT)
Dept: CT IMAGING | Age: 74
End: 2024-08-27
Payer: MEDICARE

## 2024-08-27 ENCOUNTER — HOSPITAL ENCOUNTER (EMERGENCY)
Age: 74
Discharge: HOME | End: 2024-08-27
Attending: EMERGENCY MEDICINE
Payer: MEDICARE

## 2024-08-27 VITALS
WEIGHT: 120 LBS | TEMPERATURE: 98.1 F | DIASTOLIC BLOOD PRESSURE: 66 MMHG | RESPIRATION RATE: 14 BRPM | BODY MASS INDEX: 19.99 KG/M2 | HEIGHT: 65 IN | OXYGEN SATURATION: 100 % | HEART RATE: 56 BPM | SYSTOLIC BLOOD PRESSURE: 151 MMHG

## 2024-08-27 DIAGNOSIS — R07.9 CHEST PAIN, UNSPECIFIED TYPE: Primary | ICD-10-CM

## 2024-08-27 LAB
ALBUMIN SERPL-MCNC: 4.1 G/DL (ref 3.5–4.6)
ALP SERPL-CCNC: 103 U/L (ref 40–130)
ALT SERPL-CCNC: 14 U/L (ref 0–33)
ANION GAP SERPL CALCULATED.3IONS-SCNC: 10 MEQ/L (ref 9–15)
AST SERPL-CCNC: 22 U/L (ref 0–35)
BASOPHILS # BLD: 0 K/UL (ref 0–0.2)
BASOPHILS NFR BLD: 0.4 %
BILIRUB SERPL-MCNC: 0.3 MG/DL (ref 0.2–0.7)
BILIRUB UR QL STRIP: NEGATIVE
BUN SERPL-MCNC: 10 MG/DL (ref 8–23)
CALCIUM SERPL-MCNC: 9.3 MG/DL (ref 8.5–9.9)
CHLORIDE SERPL-SCNC: 99 MEQ/L (ref 95–107)
CLARITY UR: CLEAR
CO2 SERPL-SCNC: 28 MEQ/L (ref 20–31)
COLOR UR: YELLOW
CREAT SERPL-MCNC: 0.62 MG/DL (ref 0.5–0.9)
EKG ATRIAL RATE: 72 BPM
EKG P AXIS: 79 DEGREES
EKG P-R INTERVAL: 160 MS
EKG Q-T INTERVAL: 378 MS
EKG QRS DURATION: 84 MS
EKG QTC CALCULATION (BAZETT): 413 MS
EKG R AXIS: 83 DEGREES
EKG T AXIS: 85 DEGREES
EKG VENTRICULAR RATE: 72 BPM
EOSINOPHIL # BLD: 0.1 K/UL (ref 0–0.7)
EOSINOPHIL NFR BLD: 1.6 %
ERYTHROCYTE [DISTWIDTH] IN BLOOD BY AUTOMATED COUNT: 13.2 % (ref 11.5–14.5)
GLOBULIN SER CALC-MCNC: 2.4 G/DL (ref 2.3–3.5)
GLUCOSE SERPL-MCNC: 138 MG/DL (ref 70–99)
GLUCOSE UR STRIP-MCNC: NEGATIVE MG/DL
HCT VFR BLD AUTO: 42 % (ref 37–47)
HGB BLD-MCNC: 14.2 G/DL (ref 12–16)
HGB UR QL STRIP: NEGATIVE
KETONES UR STRIP-MCNC: NEGATIVE MG/DL
LACTATE BLDV-SCNC: 1.8 MMOL/L (ref 0.5–2.2)
LEUKOCYTE ESTERASE UR QL STRIP: NEGATIVE
LYMPHOCYTES # BLD: 0.8 K/UL (ref 1–4.8)
LYMPHOCYTES NFR BLD: 14.8 %
MAGNESIUM SERPL-MCNC: 2.1 MG/DL (ref 1.7–2.4)
MCH RBC QN AUTO: 30.9 PG (ref 27–31.3)
MCHC RBC AUTO-ENTMCNC: 33.8 % (ref 33–37)
MCV RBC AUTO: 91.3 FL (ref 79.4–94.8)
MONOCYTES # BLD: 0.4 K/UL (ref 0.2–0.8)
MONOCYTES NFR BLD: 8.2 %
NEUTROPHILS # BLD: 3.9 K/UL (ref 1.4–6.5)
NEUTS SEG NFR BLD: 74.6 %
NITRITE UR QL STRIP: NEGATIVE
PH UR STRIP: 5.5 [PH] (ref 5–9)
PLATELET # BLD AUTO: 201 K/UL (ref 130–400)
POTASSIUM SERPL-SCNC: 4.3 MEQ/L (ref 3.4–4.9)
PROT SERPL-MCNC: 6.5 G/DL (ref 6.3–8)
PROT UR STRIP-MCNC: NEGATIVE MG/DL
RBC # BLD AUTO: 4.6 M/UL (ref 4.2–5.4)
SODIUM SERPL-SCNC: 137 MEQ/L (ref 135–144)
SP GR UR STRIP: 1.01 (ref 1–1.03)
TROPONIN, HIGH SENSITIVITY: 7 NG/L (ref 0–19)
TROPONIN, HIGH SENSITIVITY: 8 NG/L (ref 0–19)
URINE REFLEX TO CULTURE: NORMAL
UROBILINOGEN UR STRIP-ACNC: 0.2 E.U./DL
WBC # BLD AUTO: 5.2 K/UL (ref 4.8–10.8)

## 2024-08-27 PROCEDURE — 99284 EMERGENCY DEPT VISIT MOD MDM: CPT

## 2024-08-27 PROCEDURE — 83605 ASSAY OF LACTIC ACID: CPT

## 2024-08-27 PROCEDURE — 85025 COMPLETE CBC W/AUTO DIFF WBC: CPT

## 2024-08-27 PROCEDURE — 81003 URINALYSIS AUTO W/O SCOPE: CPT

## 2024-08-27 PROCEDURE — 84484 ASSAY OF TROPONIN QUANT: CPT

## 2024-08-27 PROCEDURE — 70450 CT HEAD/BRAIN W/O DYE: CPT

## 2024-08-27 PROCEDURE — 2580000003 HC RX 258: Performed by: EMERGENCY MEDICINE

## 2024-08-27 PROCEDURE — 93005 ELECTROCARDIOGRAM TRACING: CPT | Performed by: EMERGENCY MEDICINE

## 2024-08-27 PROCEDURE — 80053 COMPREHEN METABOLIC PANEL: CPT

## 2024-08-27 PROCEDURE — 93010 ELECTROCARDIOGRAM REPORT: CPT | Performed by: INTERNAL MEDICINE

## 2024-08-27 PROCEDURE — 83735 ASSAY OF MAGNESIUM: CPT

## 2024-08-27 RX ORDER — 0.9 % SODIUM CHLORIDE 0.9 %
1000 INTRAVENOUS SOLUTION INTRAVENOUS ONCE
Status: COMPLETED | OUTPATIENT
Start: 2024-08-27 | End: 2024-08-27

## 2024-08-27 RX ADMIN — SODIUM CHLORIDE 1000 ML: 9 INJECTION, SOLUTION INTRAVENOUS at 12:25

## 2024-08-27 ASSESSMENT — PAIN - FUNCTIONAL ASSESSMENT
PAIN_FUNCTIONAL_ASSESSMENT: NONE - DENIES PAIN
PAIN_FUNCTIONAL_ASSESSMENT: 0-10
PAIN_FUNCTIONAL_ASSESSMENT: 0-10

## 2024-08-27 ASSESSMENT — PAIN DESCRIPTION - ORIENTATION: ORIENTATION: LEFT;UPPER

## 2024-08-27 ASSESSMENT — PAIN SCALES - GENERAL
PAINLEVEL_OUTOF10: 6
PAINLEVEL_OUTOF10: 0

## 2024-08-27 ASSESSMENT — PAIN DESCRIPTION - FREQUENCY: FREQUENCY: INTERMITTENT

## 2024-08-27 ASSESSMENT — PAIN DESCRIPTION - PAIN TYPE: TYPE: ACUTE PAIN

## 2024-08-27 ASSESSMENT — PAIN DESCRIPTION - DESCRIPTORS: DESCRIPTORS: PRESSURE

## 2024-08-27 ASSESSMENT — PAIN DESCRIPTION - LOCATION: LOCATION: CHEST;HEAD

## 2024-08-27 NOTE — ED PROVIDER NOTES
12:39 PM EDT  [facility] ED  EMERGENCY DEPARTMENT ENCOUNTER      Pt Name: Erlinda Alvarenga  MRN: [id number]  Birthdate 1950  Date of evaluation: 8/27/2024  Provider: Kunal Cespedes MD    CHIEF COMPLAINT       Chief Complaint   Patient presents with    Chest Pain    Dizziness         HISTORY OF PRESENT ILLNESS   (Location/Symptom, Timing/Onset, Context/Setting, Quality, Duration, Modifying Factors, Severity)  Note limiting factors.   73-year-old female presenting with chest pain and dizziness.  Symptoms started this morning.  Pain is substernal and not made better or worse by anything in particular.  No cough, fever.  Symptoms are non exertional.  Dizziness is more a lightheadedness.        Nursing Notes were reviewed.    REVIEW OF SYSTEMS    (2-9 systems for level 4, 10 or more for level 5)     Review of Systems   Cardiovascular:  Positive for chest pain.   Neurological:  Positive for light-headedness.   All other systems reviewed and are negative.      Except as noted above the remainder of the review of systems was reviewed and negative.       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 01/2024    TMJ (dislocation of temporomandibular joint)     TMJ arthritis          SURGICAL HISTORY       Past Surgical History:   Procedure Laterality Date    COLONOSCOPY      DIAGNOSTIC CARDIAC CATH LAB PROCEDURE  04/18/2019    THYROIDECTOMY  2017    Thaddeus negron  -cc    THYROIDECTOMY Left 01/06/2015    VAGINA SURGERY N/A 1/25/2024    REVERSE INTROITOPLASTY  4, 8 or more for level 5)     ED Triage Vitals [08/27/24 1156]   BP Systolic BP Percentile Diastolic BP Percentile Temp Temp Source Pulse Respirations SpO2   (!) 149/85 -- -- 98.1 °F (36.7 °C) Oral 87 20 97 %      Height Weight - Scale         1.651 m (5' 5\") 54.4 kg (120 lb)             Physical Exam  Vitals and nursing note reviewed.   Constitutional:       General: She is not in acute distress.     Appearance: Normal appearance. She is well-developed. She is not ill-appearing.   HENT:      Head: Normocephalic and atraumatic.      Mouth/Throat:      Mouth: Mucous membranes are moist.      Pharynx: Oropharynx is clear.   Eyes:      Extraocular Movements: Extraocular movements intact.      Conjunctiva/sclera: Conjunctivae normal.      Pupils: Pupils are equal, round, and reactive to light.   Cardiovascular:      Rate and Rhythm: Normal rate and regular rhythm.   Pulmonary:      Effort: Pulmonary effort is normal.      Breath sounds: Normal breath sounds.   Abdominal:      General: Bowel sounds are normal.      Palpations: Abdomen is soft.      Tenderness: There is no abdominal tenderness.   Musculoskeletal:         General: No deformity. Normal range of motion.      Cervical back: Normal range of motion and neck supple.   Skin:     General: Skin is warm and dry.      Capillary Refill: Capillary refill takes less than 2 seconds.   Neurological:      General: No focal deficit present.      Mental Status: She is alert and oriented to person, place, and time. Mental status is at baseline.      Cranial Nerves: No cranial nerve deficit.   Psychiatric:         Thought Content: Thought content normal.         DIAGNOSTIC RESULTS     EKG: All EKG's are interpreted by the Emergency Department Physician who either signs or Co-signs this chart in the absence of a cardiologist.    NSR, rate 72, normal intervals, no ST elevation/ depression    RADIOLOGY:   Non-plain film images such as CT, Ultrasound and MRI are read by the

## 2024-08-27 NOTE — ED NOTES
Pt stable, a&ox4, skin w/d/pink, 0 distress, 0 c/o, 0 problems, pt out from ed with steady gait, 0 c/o.

## 2024-08-28 ENCOUNTER — TELEPHONE (OUTPATIENT)
Dept: FAMILY MEDICINE CLINIC | Age: 74
End: 2024-08-28

## 2024-08-28 NOTE — TELEPHONE ENCOUNTER
----- Message from Ma. R sent at 8/28/2024  9:37 AM EDT -----  Regarding: ECC Appointment Request  ECC Appointment Request    Patient needs appointment for ECC Appointment Type: ED Follow-Up.    Patient Requested Dates(s): August 30, 2024  Patient Requested Time: 11:00 AM  Provider Name: Mini Wiseman PA-C    Reason for Appointment Request: Established Patient - Available appointments did not meet patient need  --------------------------------------------------------------------------------------------------------------------------    Relationship to Patient: Self     Call Back Information: OK to leave message on voicemail  Preferred Call Back Number: Phone [phone] (home)

## 2024-08-30 ENCOUNTER — OFFICE VISIT (OUTPATIENT)
Dept: FAMILY MEDICINE CLINIC | Age: 74
End: 2024-08-30

## 2024-08-30 VITALS
TEMPERATURE: 97.6 F | HEART RATE: 65 BPM | BODY MASS INDEX: 19.66 KG/M2 | SYSTOLIC BLOOD PRESSURE: 134 MMHG | HEIGHT: 65 IN | DIASTOLIC BLOOD PRESSURE: 80 MMHG | OXYGEN SATURATION: 98 % | WEIGHT: 118 LBS

## 2024-08-30 DIAGNOSIS — E78.2 MIXED HYPERLIPIDEMIA: ICD-10-CM

## 2024-08-30 DIAGNOSIS — J43.2 CENTRILOBULAR EMPHYSEMA (HCC): ICD-10-CM

## 2024-08-30 DIAGNOSIS — K04.7 DENTAL ABSCESS: Primary | ICD-10-CM

## 2024-08-30 DIAGNOSIS — I25.118 ATHEROSCLEROTIC HEART DISEASE OF NATIVE CORONARY ARTERY WITH OTHER FORMS OF ANGINA PECTORIS (HCC): ICD-10-CM

## 2024-08-30 DIAGNOSIS — I48.20 CHRONIC ATRIAL FIBRILLATION (HCC): ICD-10-CM

## 2024-08-30 DIAGNOSIS — I10 ESSENTIAL HYPERTENSION: ICD-10-CM

## 2024-08-30 DIAGNOSIS — R09.89 BILATERAL CAROTID BRUITS: ICD-10-CM

## 2024-08-30 DIAGNOSIS — F41.9 ANXIETY: ICD-10-CM

## 2024-08-30 RX ORDER — CLINDAMYCIN HCL 300 MG
300 CAPSULE ORAL 3 TIMES DAILY
Qty: 30 CAPSULE | Refills: 0 | Status: SHIPPED | OUTPATIENT
Start: 2024-08-30 | End: 2024-09-09

## 2024-08-30 RX ORDER — FLUCONAZOLE 150 MG/1
150 TABLET ORAL ONCE
Qty: 1 TABLET | Refills: 0 | Status: SHIPPED | OUTPATIENT
Start: 2024-08-30 | End: 2024-08-30

## 2024-08-30 NOTE — PROGRESS NOTES
Subjective  Erlinda Alvarenga, 73 y.o. female presents today with:  Chief Complaint   Patient presents with    Follow-up     Patient presents today for ED f/up from 8/27 due to chest pain.        HPI  Patient is here for follow-up to the  for dizziness and sharp left sided chest pain imaging CT head labs EKG  were unremarkable patient states she had been in the sun and had not been drinking water sxs resolved with IV hydration  BP was elevated initially but returned to normal  Ho afib / cad / maynor / htn / hyperlipidemia - Dr. Robert was also consulted  Reports she has had recurrent issues within the dental infection right upper molar-on clindamycin on and off for the past 3 weeks  Complaints of a white pocket noted on gum line and pain/swelling  right maxillary region this morning is dental follow-up scheduled next week  Patient also has issues with recurrent sinusitis and allergies reduce his Astelin to evening use as she felt it caused sedation  Anxiety reduce citalopram to 20 mg once daily which is also improved her sedation  History of hypothyroidism controlled on Synthroid 50 mg micrograms alternate 100mg  followed by [facility]  Copd continues to smoke - s table  Review of Systems   All other systems reviewed and are negative.        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              Assessment & Plan    Diagnosis Orders   1. Dental abscess  clindamycin (CLEOCIN) 300 MG capsule      2. Essential hypertension  Lipid, Fasting    controlled      3. Atherosclerotic heart disease of native coronary artery with other forms of angina pectoris (HCC)        4. Chronic atrial fibrillation (HCC)      stable      5. Mixed hyperlipidemia  Lipid, Fasting    ruvalcaba pending      6. Bilateral carotid bruits        7. Centrilobular emphysema (HCC)      stable      8. Anxiety      may change  celexa to 20mg bid if needed or 40mg nightly            Orders Placed This Encounter   Procedures    Lipid, Fasting     Standing Status:   Future     Standing Expiration Date:   8/30/2025     Orders Placed This Encounter   Medications    clindamycin (CLEOCIN) 300 MG capsule     Sig: Take 1 capsule by mouth 3 times daily for 10 days     Dispense:  30 capsule     Refill:  0    fluconazole (DIFLUCAN) 150 MG tablet     Sig: Take 1 tablet by mouth once for 1 dose     Dispense:  1 tablet     Refill:  0     There are no discontinued medications.  Return in about 6 months (around 2/28/2025), or if symptoms worsen or fail to improve.    Mini Wiseman PA-C

## 2024-09-05 DIAGNOSIS — E78.2 MIXED HYPERLIPIDEMIA: ICD-10-CM

## 2024-09-05 DIAGNOSIS — I10 ESSENTIAL HYPERTENSION: ICD-10-CM

## 2024-09-05 LAB
CHOLEST SERPL-MCNC: 165 MG/DL (ref 0–199)
HDLC SERPL-MCNC: 97 MG/DL (ref 40–59)
LDL CHOLESTEROL: 56 MG/DL (ref 0–129)
TRIGLYCERIDE, FASTING: 61 MG/DL (ref 0–150)

## 2024-09-11 RX ORDER — BENZONATATE 200 MG/1
200 CAPSULE ORAL 3 TIMES DAILY PRN
Qty: 30 CAPSULE | Refills: 0 | Status: SHIPPED | OUTPATIENT
Start: 2024-09-11

## 2024-09-23 DIAGNOSIS — F41.9 ANXIETY: ICD-10-CM

## 2024-09-23 DIAGNOSIS — F51.04 CHRONIC INSOMNIA: ICD-10-CM

## 2024-09-23 RX ORDER — ALPRAZOLAM 0.5 MG
TABLET ORAL
Qty: 90 TABLET | Refills: 0 | Status: SHIPPED | OUTPATIENT
Start: 2024-10-01 | End: 2024-12-30

## 2024-10-07 RX ORDER — BENZONATATE 200 MG/1
200 CAPSULE ORAL 3 TIMES DAILY PRN
Qty: 30 CAPSULE | Refills: 5 | Status: SHIPPED | OUTPATIENT
Start: 2024-10-07

## 2024-10-07 RX ORDER — BUDESONIDE AND FORMOTEROL FUMARATE 160; 4.5 UG/1; UG/1
2 AEROSOL, METERED RESPIRATORY (INHALATION) 2 TIMES DAILY
Qty: 11 G | Refills: 5 | Status: SHIPPED | OUTPATIENT
Start: 2024-10-07

## 2024-10-07 NOTE — TELEPHONE ENCOUNTER
Future Appointments    Encounter Information   Provider Department Appt Notes   10/14/2024 Gómez Robert MD [facility] Card[facility] 6 month follow up   2/28/2025 Mini Wiseman PA-C [facility] 6 mo f/u // sxc     Past Visits    Date Provider Specialty Visit Type Primary Dx   08/30/2024 Mini Wiseman PA-C Family Medicine Office Visit Dental abscess   08/09/2024 Yanni Gaitan APRN - CNP Family Medicine Office Visit Oral soft tissue complaint   07/17/2024 Mini Wiseman PA-C Family Medicine Telemedicine Encounter for annual wellness visit (AWV) in Medicare patient   04/22/2024 Luna Lester MD Obstetrics and Gynecology Office Visit Postop check   04/11/2024 Gómez Robert MD Cardiology Office Visit Essential hypertension

## 2024-10-14 ENCOUNTER — OFFICE VISIT (OUTPATIENT)
Dept: CARDIOLOGY CLINIC | Age: 74
End: 2024-10-14
Payer: MEDICARE

## 2024-10-14 VITALS
WEIGHT: 117 LBS | SYSTOLIC BLOOD PRESSURE: 120 MMHG | DIASTOLIC BLOOD PRESSURE: 76 MMHG | OXYGEN SATURATION: 90 % | HEART RATE: 90 BPM | BODY MASS INDEX: 19.47 KG/M2

## 2024-10-14 DIAGNOSIS — R06.09 DOE (DYSPNEA ON EXERTION): ICD-10-CM

## 2024-10-14 DIAGNOSIS — I65.23 BILATERAL CAROTID ARTERY STENOSIS: ICD-10-CM

## 2024-10-14 DIAGNOSIS — E78.2 MIXED HYPERLIPIDEMIA: ICD-10-CM

## 2024-10-14 DIAGNOSIS — R06.02 SHORTNESS OF BREATH: ICD-10-CM

## 2024-10-14 DIAGNOSIS — I25.10 CORONARY ARTERY DISEASE INVOLVING NATIVE CORONARY ARTERY OF NATIVE HEART WITHOUT ANGINA PECTORIS: ICD-10-CM

## 2024-10-14 DIAGNOSIS — R07.9 CHEST PAIN, UNSPECIFIED TYPE: ICD-10-CM

## 2024-10-14 DIAGNOSIS — I10 ESSENTIAL HYPERTENSION: ICD-10-CM

## 2024-10-14 DIAGNOSIS — E78.5 HYPERLIPIDEMIA, UNSPECIFIED HYPERLIPIDEMIA TYPE: Primary | ICD-10-CM

## 2024-10-14 PROCEDURE — 99214 OFFICE O/P EST MOD 30 MIN: CPT | Performed by: INTERNAL MEDICINE

## 2024-10-14 PROCEDURE — 3074F SYST BP LT 130 MM HG: CPT | Performed by: INTERNAL MEDICINE

## 2024-10-14 PROCEDURE — 1123F ACP DISCUSS/DSCN MKR DOCD: CPT | Performed by: INTERNAL MEDICINE

## 2024-10-14 PROCEDURE — 3078F DIAST BP <80 MM HG: CPT | Performed by: INTERNAL MEDICINE

## 2024-10-14 ASSESSMENT — ENCOUNTER SYMPTOMS
BLOOD IN STOOL: 0
RESPIRATORY NEGATIVE: 1
SHORTNESS OF BREATH: 0
STRIDOR: 0
COUGH: 0
GASTROINTESTINAL NEGATIVE: 1
NAUSEA: 0
CHEST TIGHTNESS: 0
WHEEZING: 0
EYES NEGATIVE: 1

## 2024-10-14 NOTE — PROGRESS NOTES
Subsequent Progress Note    Patient: Erlinda GIBSON Cutlip  YOB: 1950  MRN: [id number]    Chief Complaint: carotid htn carotid cad  Chief Complaint   Patient presents with    Follow-up       CV Data:  2/2017 spect negative ccf  2/2017 PVR negative ccf  1/2019 CUS extensive atherosclerosis R>L  1/19 echo ef 60   4/5/2019 CTA neck - CHERELLE 24%  LICA 34  4/18/2019 CATH LAD Mild RCA 20% EF 55 EDP 5   S/P Complete Thyroidectomy   12/2020 PVR negative  12/2020 CUS CHERELLE 50-69%   1/22 CUS B/L 50%  4/24 CUS mild     Subjective/HPI: no cp no sob no falls no bleed tolerating only 1/2 tab Livalo. Full tab caused severe myalgia.  LDL not to goal.      1/13/2020 no cp no sob no falls no bleed f/u CT chest mild enlarged R nodule.     12/7/2020 still smokes. No cp no palp no falls no bleeed no sob    1/11/21 TELEHEALTH EVALUATION -- Audio/Visual (During COVID-19 public health emergency)    No cp no sob no falls no bleed. Eats well still smokes    5/12/21 TELEHEALTH EVALUATION -- Audio/Visual (During COVID-19 public health emergency)    No cp no sob no falls no bleed take smeds doing well    9/16/21 now has bronchitis. No cp no sob no falls occ hemorrhoid bleed. Takes med    1/24/22 doing well no cp no sob no bleed. No falls takes meds. PFT at [facility] - moderate COPD    5/23/22 doing well no cp no so bno falls takes meds    10/10/22 doing well no cp no sob no falls nob leed. Takes meds.     4/10/23 er yesterday due to right facial swelling due to dental issues. CV wise doing well no cp no sob no falls no bleed.     10/9/23 recent Bronchitis. Recent UTI and hematuria. Resolved.  She has appt w [facility] Urologist. No cp     4/11/24 has back ache no cp no sob tripped over a side walk and fell.   Takes well.  Recent UTI and brief Hematuria - resolved.     10/14/24 now more VALE w Exertional chest pressure. No falls no bleed occ LH. No edema    Smokes 1/2 ppd   Retired   Lives w      EKG: SR 66    Past Medical History:

## 2024-10-16 DIAGNOSIS — E78.2 MIXED HYPERLIPIDEMIA: ICD-10-CM

## 2024-10-16 DIAGNOSIS — E78.5 HYPERLIPIDEMIA, UNSPECIFIED HYPERLIPIDEMIA TYPE: ICD-10-CM

## 2024-10-16 RX ORDER — EVOLOCUMAB 140 MG/ML
INJECTION, SOLUTION SUBCUTANEOUS
Qty: 2 ML | Refills: 5 | Status: SHIPPED | OUTPATIENT
Start: 2024-10-16

## 2024-10-16 NOTE — TELEPHONE ENCOUNTER
Requesting medication refill. Please approve or deny this request.    Rx requested:  Requested Prescriptions     Pending Prescriptions Disp Refills    REPATHA SOSY syringe [Pharmacy Med Name: Repatha 140 MG/ML Subcutaneous Solution Prefilled Syringe] 2 mL 0     Sig: INJECT 1 ML SUBCUTANEOUSLY EVERY 14 DAYS         Last Office Visit:   10/14/2024      Next Visit Date:  Future Appointments   Date Time Provider Department Center   2/28/2025 12:00 PM Mini Wiseman PA-C [facility] DEP   4/28/2025 12:15 PM Gómez Robert MD [facility]

## 2024-10-23 ENCOUNTER — HOSPITAL ENCOUNTER (OUTPATIENT)
Dept: NUCLEAR MEDICINE | Age: 74
Discharge: HOME | End: 2024-10-25
Attending: INTERNAL MEDICINE
Payer: MEDICARE

## 2024-10-23 ENCOUNTER — HOSPITAL ENCOUNTER (OUTPATIENT)
Age: 74
Discharge: HOME | End: 2024-10-25
Attending: INTERNAL MEDICINE
Payer: MEDICARE

## 2024-10-23 VITALS
BODY MASS INDEX: 19.49 KG/M2 | HEIGHT: 65 IN | SYSTOLIC BLOOD PRESSURE: 120 MMHG | DIASTOLIC BLOOD PRESSURE: 76 MMHG | WEIGHT: 117 LBS

## 2024-10-23 DIAGNOSIS — R07.9 CHEST PAIN, UNSPECIFIED TYPE: ICD-10-CM

## 2024-10-23 DIAGNOSIS — R06.02 SHORTNESS OF BREATH: ICD-10-CM

## 2024-10-23 LAB
ECHO AV AREA PEAK VELOCITY: 2.1 CM2
ECHO AV AREA PEAK VELOCITY: 2.1 CM2
ECHO AV AREA VTI: 2.9 CM2
ECHO AV AREA/BSA VTI: 1.8 CM2/M2
ECHO AV CUSP MM: 1.5 CM
ECHO AV MEAN GRADIENT: 3 MMHG
ECHO AV MEAN VELOCITY: 0.8 M/S
ECHO AV PEAK GRADIENT: 8 MMHG
ECHO AV PEAK GRADIENT: 8 MMHG
ECHO AV PEAK VELOCITY: 1.4 M/S
ECHO AV PEAK VELOCITY: 1.4 M/S
ECHO AV VTI: 26.4 CM
ECHO BSA: 1.56 M2
ECHO EST RA PRESSURE: 3 MMHG
ECHO LA DIAMETER INDEX: 1.71 CM/M2
ECHO LA DIAMETER: 2.7 CM
ECHO LA VOL A-L A2C: 23 ML (ref 22–52)
ECHO LA VOL A-L A4C: 16 ML (ref 22–52)
ECHO LA VOL MOD A2C: 22 ML (ref 22–52)
ECHO LA VOL MOD A4C: 14 ML (ref 22–52)
ECHO LA VOLUME AREA LENGTH: 20 ML
ECHO LA VOLUME INDEX A-L A2C: 15 ML/M2 (ref 16–34)
ECHO LA VOLUME INDEX A-L A4C: 10 ML/M2 (ref 16–34)
ECHO LA VOLUME INDEX AREA LENGTH: 13 ML/M2 (ref 16–34)
ECHO LA VOLUME INDEX MOD A2C: 14 ML/M2 (ref 16–34)
ECHO LA VOLUME INDEX MOD A4C: 9 ML/M2 (ref 16–34)
ECHO LV E' LATERAL VELOCITY: 9.2 CM/S
ECHO LV E' SEPTAL VELOCITY: 7.4 CM/S
ECHO LV EDV A2C: 48 ML
ECHO LV EDV A4C: 51 ML
ECHO LV EDV BP: 50 ML (ref 56–104)
ECHO LV EDV INDEX A4C: 32 ML/M2
ECHO LV EDV INDEX BP: 32 ML/M2
ECHO LV EDV NDEX A2C: 30 ML/M2
ECHO LV EJECTION FRACTION A2C: 66 %
ECHO LV EJECTION FRACTION A4C: 57 %
ECHO LV EJECTION FRACTION BIPLANE: 62 % (ref 55–100)
ECHO LV ESV A2C: 16 ML
ECHO LV ESV A4C: 22 ML
ECHO LV ESV BP: 19 ML (ref 19–49)
ECHO LV ESV INDEX A2C: 10 ML/M2
ECHO LV ESV INDEX A4C: 14 ML/M2
ECHO LV ESV INDEX BP: 12 ML/M2
ECHO LV FRACTIONAL SHORTENING: 31 % (ref 28–44)
ECHO LV INTERNAL DIMENSION DIASTOLE INDEX: 2.47 CM/M2
ECHO LV INTERNAL DIMENSION DIASTOLIC: 3.9 CM (ref 3.9–5.3)
ECHO LV INTERNAL DIMENSION SYSTOLIC INDEX: 1.71 CM/M2
ECHO LV INTERNAL DIMENSION SYSTOLIC: 2.7 CM
ECHO LV IVSD: 0.7 CM (ref 0.6–0.9)
ECHO LV IVSS: 1 CM
ECHO LV MASS 2D: 82.3 G (ref 67–162)
ECHO LV MASS INDEX 2D: 52.1 G/M2 (ref 43–95)
ECHO LV POSTERIOR WALL DIASTOLIC: 0.8 CM (ref 0.6–0.9)
ECHO LV POSTERIOR WALL SYSTOLIC: 1.3 CM
ECHO LV RELATIVE WALL THICKNESS RATIO: 0.41
ECHO LVOT AREA: 2.8 CM2
ECHO LVOT AV VTI INDEX: 0.99
ECHO LVOT DIAM: 1.9 CM
ECHO LVOT MEAN GRADIENT: 2 MMHG
ECHO LVOT PEAK GRADIENT: 4 MMHG
ECHO LVOT PEAK VELOCITY: 1 M/S
ECHO LVOT STROKE VOLUME INDEX: 47 ML/M2
ECHO LVOT SV: 74.2 ML
ECHO LVOT VTI: 26.2 CM
ECHO MV A VELOCITY: 0.73 M/S
ECHO MV AREA VTI: 2.6 CM2
ECHO MV E DECELERATION TIME (DT): 238.7 MS
ECHO MV E VELOCITY: 0.81 M/S
ECHO MV E/A RATIO: 1.11
ECHO MV E/E' LATERAL: 8.8
ECHO MV E/E' RATIO (AVERAGED): 9.88
ECHO MV E/E' SEPTAL: 10.95
ECHO MV LVOT VTI INDEX: 1.11
ECHO MV MAX VELOCITY: 0.9 M/S
ECHO MV MEAN GRADIENT: 1 MMHG
ECHO MV MEAN VELOCITY: 0.4 M/S
ECHO MV PEAK GRADIENT: 3 MMHG
ECHO MV REGURGITANT PEAK GRADIENT: 61 MMHG
ECHO MV REGURGITANT PEAK VELOCITY: 3.9 M/S
ECHO MV VTI: 29.1 CM
ECHO PV MAX VELOCITY: 1 M/S
ECHO PV PEAK GRADIENT: 4 MMHG
ECHO RIGHT VENTRICULAR SYSTOLIC PRESSURE (RVSP): 34 MMHG
ECHO RV INTERNAL DIMENSION: 2.1 CM
ECHO RV TAPSE: 1.9 CM (ref 1.7–?)
ECHO TV REGURGITANT MAX VELOCITY: 2.77 M/S
ECHO TV REGURGITANT PEAK GRADIENT: 31 MMHG
NUC STRESS EJECTION FRACTION: 69 %
STRESS BASELINE DIAS BP: 66 MMHG
STRESS BASELINE HR: 60 BPM
STRESS BASELINE ST DEPRESSION: 0 MM
STRESS BASELINE SYS BP: 130 MMHG
STRESS ESTIMATED WORKLOAD: 1 METS
STRESS PEAK DIAS BP: 74 MMHG
STRESS PEAK SYS BP: 147 MMHG
STRESS PERCENT HR ACHIEVED: 55 %
STRESS POST PEAK HR: 81 BPM
STRESS RATE PRESSURE PRODUCT: NORMAL BPM*MMHG
STRESS ST DEPRESSION: 0 MM
STRESS TARGET HR: 147 BPM
TID: 1.01

## 2024-10-23 PROCEDURE — 93017 CV STRESS TEST TRACING ONLY: CPT

## 2024-10-23 PROCEDURE — 93018 CV STRESS TEST I&R ONLY: CPT | Performed by: INTERNAL MEDICINE

## 2024-10-23 PROCEDURE — C8929 TTE W OR WO FOL WCON,DOPPLER: HCPCS

## 2024-10-23 PROCEDURE — 78452 HT MUSCLE IMAGE SPECT MULT: CPT

## 2024-10-23 PROCEDURE — 2580000003 HC RX 258: Performed by: INTERNAL MEDICINE

## 2024-10-23 PROCEDURE — 3430000000 HC RX DIAGNOSTIC RADIOPHARMACEUTICAL: Performed by: INTERNAL MEDICINE

## 2024-10-23 PROCEDURE — 6360000004 HC RX CONTRAST MEDICATION: Performed by: INTERNAL MEDICINE

## 2024-10-23 PROCEDURE — 6360000002 HC RX W HCPCS: Performed by: INTERNAL MEDICINE

## 2024-10-23 PROCEDURE — A9502 TC99M TETROFOSMIN: HCPCS | Performed by: INTERNAL MEDICINE

## 2024-10-23 RX ORDER — REGADENOSON 0.08 MG/ML
0.4 INJECTION, SOLUTION INTRAVENOUS
Status: COMPLETED | OUTPATIENT
Start: 2024-10-23 | End: 2024-10-23

## 2024-10-23 RX ORDER — SODIUM CHLORIDE 0.9 % (FLUSH) 0.9 %
10 SYRINGE (ML) INJECTION PRN
Status: COMPLETED | OUTPATIENT
Start: 2024-10-23 | End: 2024-10-23

## 2024-10-23 RX ADMIN — TETROFOSMIN 9.3 MILLICURIE: 1.38 INJECTION, POWDER, LYOPHILIZED, FOR SOLUTION INTRAVENOUS at 08:08

## 2024-10-23 RX ADMIN — Medication 10 ML: at 09:27

## 2024-10-23 RX ADMIN — TETROFOSMIN 27.1 MILLICURIE: 1.38 INJECTION, POWDER, LYOPHILIZED, FOR SOLUTION INTRAVENOUS at 09:26

## 2024-10-23 RX ADMIN — Medication 10 ML: at 08:08

## 2024-10-23 RX ADMIN — PERFLUTREN 1.5 ML: 6.52 INJECTION, SUSPENSION INTRAVENOUS at 12:30

## 2024-10-23 RX ADMIN — REGADENOSON 0.4 MG: 0.08 INJECTION, SOLUTION INTRAVENOUS at 09:26

## 2024-10-23 RX ADMIN — Medication 10 ML: at 09:26

## 2024-11-04 ENCOUNTER — TELEPHONE (OUTPATIENT)
Dept: CARDIOLOGY CLINIC | Age: 74
End: 2024-11-04

## 2024-11-04 NOTE — TELEPHONE ENCOUNTER
STRESS TEST    Interpretation Summary  Show Result Comparison     Stress Combined Conclusion: Normal pharmacological myocardial perfusion study. Findings suggest a low risk of cardiac events.    Stress Function: Left ventricular function post-stress is normal. Post-stress ejection fraction is 69%.    Perfusion Comments: LV perfusion is normal.    Perfusion Conclusion: There is no evidence of transient ischemic dilation (TID). TID ratio is 1.01.    ECG: Resting ECG demonstrates normal sinus rhythm.    ECG: The stress ECG was negative for ischemia.    Stress Test: A pharmacological stress test was performed using regadenoson (Lexiscan). The patient reported no chest pain during the stress test.    Image quality is good.        ECHO    Interpretation Summary  Show Result Comparison     Left Ventricle: Normal left ventricular systolic function with a visually estimated EF of 60 - 65%. Left ventricle size is normal. Normal wall thickness. Normal wall motion. Normal diastolic function.    Image quality is adequate. Contrast used: Definity.

## 2024-11-04 NOTE — TELEPHONE ENCOUNTER
Pt called and stated that she had an echo and stress test and Manuelito tells her it was abnormal.     Pt stated that she is calling to follow up.    Pt stated that office may leave a message if things are ok.

## 2024-11-06 RX ORDER — MIRABEGRON 50 MG/1
TABLET, FILM COATED, EXTENDED RELEASE ORAL
Qty: 90 TABLET | Refills: 0 | Status: SHIPPED | OUTPATIENT
Start: 2024-11-06

## 2024-11-13 ENCOUNTER — APPOINTMENT (OUTPATIENT)
Age: 74
End: 2024-11-13
Attending: INTERNAL MEDICINE
Payer: MEDICARE

## 2024-12-03 DIAGNOSIS — J44.1 COPD WITH ACUTE EXACERBATION (HCC): ICD-10-CM

## 2024-12-03 RX ORDER — GUAIFENESIN 600 MG/1
600 TABLET, EXTENDED RELEASE ORAL 2 TIMES DAILY
Qty: 60 TABLET | Refills: 1 | Status: SHIPPED | OUTPATIENT
Start: 2024-12-03

## 2024-12-15 DIAGNOSIS — F51.04 CHRONIC INSOMNIA: ICD-10-CM

## 2024-12-15 DIAGNOSIS — F41.9 ANXIETY: ICD-10-CM

## 2024-12-16 RX ORDER — ALPRAZOLAM 0.5 MG
0.5 TABLET ORAL DAILY
Qty: 90 TABLET | Refills: 0 | Status: SHIPPED | OUTPATIENT
Start: 2024-12-16 | End: 2025-03-16

## 2024-12-26 ENCOUNTER — OFFICE VISIT (OUTPATIENT)
Age: 74
End: 2024-12-26
Payer: MEDICARE

## 2024-12-26 VITALS
OXYGEN SATURATION: 98 % | BODY MASS INDEX: 19.33 KG/M2 | TEMPERATURE: 97.7 F | HEIGHT: 65 IN | SYSTOLIC BLOOD PRESSURE: 130 MMHG | WEIGHT: 116 LBS | DIASTOLIC BLOOD PRESSURE: 78 MMHG | HEART RATE: 68 BPM

## 2024-12-26 DIAGNOSIS — Z12.31 SCREENING MAMMOGRAM, ENCOUNTER FOR: ICD-10-CM

## 2024-12-26 DIAGNOSIS — R73.9 HYPERGLYCEMIA: ICD-10-CM

## 2024-12-26 DIAGNOSIS — J01.91 ACUTE RECURRENT SINUSITIS, UNSPECIFIED LOCATION: Primary | ICD-10-CM

## 2024-12-26 PROCEDURE — 1123F ACP DISCUSS/DSCN MKR DOCD: CPT | Performed by: PHYSICIAN ASSISTANT

## 2024-12-26 PROCEDURE — 99213 OFFICE O/P EST LOW 20 MIN: CPT | Performed by: PHYSICIAN ASSISTANT

## 2024-12-26 PROCEDURE — 3075F SYST BP GE 130 - 139MM HG: CPT | Performed by: PHYSICIAN ASSISTANT

## 2024-12-26 PROCEDURE — 1159F MED LIST DOCD IN RCRD: CPT | Performed by: PHYSICIAN ASSISTANT

## 2024-12-26 PROCEDURE — 3078F DIAST BP <80 MM HG: CPT | Performed by: PHYSICIAN ASSISTANT

## 2024-12-26 RX ORDER — AZITHROMYCIN 250 MG/1
TABLET, FILM COATED ORAL
Qty: 6 TABLET | Refills: 0 | Status: SHIPPED | OUTPATIENT
Start: 2024-12-26 | End: 2025-01-05

## 2024-12-26 RX ORDER — METHYLPREDNISOLONE 4 MG/1
TABLET ORAL
Qty: 1 KIT | Refills: 0 | Status: SHIPPED | OUTPATIENT
Start: 2024-12-26 | End: 2025-01-01

## 2024-12-26 NOTE — PROGRESS NOTES
TAKE 1 CAPSULE BY MOUTH DAILY AS NEEDED (Patient taking differently: TAKE 1 CAPSULE BY MOUTH DAILY AS NEEDED    PRN) 90 capsule 0    fluticasone (FLONASE) 50 MCG/ACT nasal spray USE 1 SPRAY INTO EACH NOSTRIL ONCE DAILY 1 Bottle 2    aspirin 81 MG tablet Take 1 tablet by mouth daily With Food 30 tablet 3    levothyroxine (SYNTHROID) 50 MCG tablet Take 1 tablet by mouth daily On Tuesday and Thursday; Saturday  she takes 2 tabs.  On Monday,Wednesday,Friday she takes 2 1/2 tabs  3     No current facility-administered medications for this visit.       Objective    Vitals:    12/26/24 1136   BP: 130/78   Site: Left Upper Arm   Position: Sitting   Cuff Size: Medium Adult   Pulse: 68   Temp: 97.7 °F (36.5 °C)   TempSrc: Temporal   SpO2: 98%   Weight: 52.6 kg (116 lb)   Height: 1.651 m (5' 5\")     Physical Exam

## 2025-01-02 ENCOUNTER — TELEPHONE (OUTPATIENT)
Age: 75
End: 2025-01-02

## 2025-01-02 NOTE — TELEPHONE ENCOUNTER
Patient called and states that she had her lab for A1C at [facility] and she can see the results and she is asking if GADIEL Mosqueda has seen the results?

## 2025-01-22 ENCOUNTER — OFFICE VISIT (OUTPATIENT)
Age: 75
End: 2025-01-22

## 2025-01-22 VITALS
HEIGHT: 65 IN | OXYGEN SATURATION: 99 % | WEIGHT: 116 LBS | DIASTOLIC BLOOD PRESSURE: 70 MMHG | HEART RATE: 76 BPM | SYSTOLIC BLOOD PRESSURE: 122 MMHG | TEMPERATURE: 97.6 F | BODY MASS INDEX: 19.33 KG/M2

## 2025-01-22 DIAGNOSIS — E03.9 ACQUIRED HYPOTHYROIDISM: Chronic | ICD-10-CM

## 2025-01-22 DIAGNOSIS — J01.00 ACUTE MAXILLARY SINUSITIS, RECURRENCE NOT SPECIFIED: Primary | ICD-10-CM

## 2025-01-22 DIAGNOSIS — J44.9 CHRONIC OBSTRUCTIVE PULMONARY DISEASE, UNSPECIFIED COPD TYPE (HCC): ICD-10-CM

## 2025-01-22 RX ORDER — LEVOTHYROXINE SODIUM 50 UG/1
TABLET ORAL
Qty: 200 TABLET | Refills: 0 | Status: SHIPPED
Start: 2025-01-22

## 2025-01-22 RX ORDER — MOXIFLOXACIN HYDROCHLORIDE 400 MG/1
400 TABLET ORAL DAILY
Qty: 10 TABLET | Refills: 0 | Status: SHIPPED | OUTPATIENT
Start: 2025-01-22 | End: 2025-02-01

## 2025-01-22 RX ORDER — MONTELUKAST SODIUM 10 MG/1
10 TABLET ORAL NIGHTLY
COMMUNITY
Start: 2025-01-03

## 2025-01-22 RX ORDER — CETIRIZINE HYDROCHLORIDE 10 MG/1
10 TABLET ORAL NIGHTLY
Qty: 30 TABLET | Refills: 2 | Status: SHIPPED | OUTPATIENT
Start: 2025-01-22

## 2025-01-22 RX ORDER — TRIAMCINOLONE ACETONIDE 40 MG/ML
40 INJECTION, SUSPENSION INTRA-ARTICULAR; INTRAMUSCULAR ONCE
Status: COMPLETED | OUTPATIENT
Start: 2025-01-22 | End: 2025-01-22

## 2025-01-22 RX ADMIN — TRIAMCINOLONE ACETONIDE 40 MG: 40 INJECTION, SUSPENSION INTRA-ARTICULAR; INTRAMUSCULAR at 10:50

## 2025-01-22 SDOH — ECONOMIC STABILITY: FOOD INSECURITY: WITHIN THE PAST 12 MONTHS, THE FOOD YOU BOUGHT JUST DIDN'T LAST AND YOU DIDN'T HAVE MONEY TO GET MORE.: NEVER TRUE

## 2025-01-22 SDOH — ECONOMIC STABILITY: FOOD INSECURITY: WITHIN THE PAST 12 MONTHS, YOU WORRIED THAT YOUR FOOD WOULD RUN OUT BEFORE YOU GOT MONEY TO BUY MORE.: NEVER TRUE

## 2025-01-22 ASSESSMENT — ENCOUNTER SYMPTOMS
SHORTNESS OF BREATH: 1
SINUS PAIN: 1
SINUS PRESSURE: 1
CHEST TIGHTNESS: 1

## 2025-01-22 ASSESSMENT — PATIENT HEALTH QUESTIONNAIRE - PHQ9
SUM OF ALL RESPONSES TO PHQ QUESTIONS 1-9: 0
SUM OF ALL RESPONSES TO PHQ QUESTIONS 1-9: 0
2. FEELING DOWN, DEPRESSED OR HOPELESS: NOT AT ALL
1. LITTLE INTEREST OR PLEASURE IN DOING THINGS: NOT AT ALL
SUM OF ALL RESPONSES TO PHQ9 QUESTIONS 1 & 2: 0
SUM OF ALL RESPONSES TO PHQ QUESTIONS 1-9: 0
SUM OF ALL RESPONSES TO PHQ QUESTIONS 1-9: 0

## 2025-01-22 NOTE — PROGRESS NOTES
Subjective  Chief Complaint   Patient presents with    Cold Symptoms     Pt states she is still having some lingering sx's from last month.   Cough, scratchy throat/losing voice, chest discomfort, cheek bones are sore.        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 per [facility] (immunology)  Hypothyroidism followed by Emir negron  [facility] labs have been stable advised to continue care here return to endo prn  Patient Active Problem List    Diagnosis Date Noted    Angina effort     Claudication (HCC) 02/09/2023    Osteopenia 03/11/2021    Bilateral epiphora 01/03/2024    Arthralgia of elbow 01/03/2024    Vaginal stenosis 12/11/2023    Vestibulitis of vagina 12/11/2023    Lung nodules 11/30/2023    Centrilobular emphysema (HCC) 11/29/2023    Cancer of the skin, basal cell 07/17/2023    Blister (nonthermal), left great toe, initial encounter 11/24/2021    Simple chronic bronchitis (HCC) 04/02/2019    Atrial fibrillation (HCC) 03/28/2019    Atherosclerotic heart disease of native coronary artery with other forms of angina pectoris (HCC) 03/28/2019    Hyperlipidemia 03/28/2019    Bilateral carotid artery stenosis 03/28/2019    Chest pain 01/11/2019    Bilateral carotid bruits 01/11/2019    Essential hypertension 01/11/2019    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

## 2025-01-30 ENCOUNTER — TELEPHONE (OUTPATIENT)
Dept: OBGYN CLINIC | Age: 75
End: 2025-01-30

## 2025-01-30 DIAGNOSIS — J30.9 ALLERGIC RHINITIS, UNSPECIFIED SEASONALITY, UNSPECIFIED TRIGGER: ICD-10-CM

## 2025-01-30 RX ORDER — MIRABEGRON 50 MG/1
TABLET, FILM COATED, EXTENDED RELEASE ORAL
Qty: 90 TABLET | Refills: 2 | Status: SHIPPED | OUTPATIENT
Start: 2025-01-30

## 2025-01-30 RX ORDER — ESTRADIOL 10 UG/1
TABLET, FILM COATED VAGINAL
Qty: 30 TABLET | Refills: 6 | Status: SHIPPED | OUTPATIENT
Start: 2025-01-30

## 2025-01-30 NOTE — TELEPHONE ENCOUNTER
MEDICATION REFILL REQUEST     Rx Requested    Requested Prescriptions     Pending Prescriptions Disp Refills    azelastine (ASTEPRO) 137 MCG/SPRAY nasal spray 120 mL 5         Patient's Last Office Visit   1/22/2025      Patient's Next Office Visit   Future Appointments   Date Time Provider Department Center   2/28/2025 12:00 PM Mini Wiseman PA-C [facility] ECC DEP   4/7/2025 11:00 AM [facility]   4/28/2025 12:15 PM Gómez Robert MD [facility]         Other comments     Patient requesting refill    [facility] confirmed    Patient phone -[phone]

## 2025-01-30 NOTE — TELEPHONE ENCOUNTER
Pt called requesting refills of 2 of their medications. Those being, Yuvafem 10 mcg vaginal tablets and Mirabegron 50mg tab. Pharmacy to send to [facility] [address]. Please advise and send if appropriate.

## 2025-01-31 RX ORDER — AZELASTINE HYDROCHLORIDE 137 UG/1
2 SPRAY, METERED NASAL 2 TIMES DAILY
Qty: 120 ML | Refills: 5 | Status: SHIPPED | OUTPATIENT
Start: 2025-01-31

## 2025-02-03 DIAGNOSIS — J44.1 COPD WITH ACUTE EXACERBATION (HCC): ICD-10-CM

## 2025-02-03 RX ORDER — GUAIFENESIN 600 MG/1
600 TABLET, EXTENDED RELEASE ORAL 2 TIMES DAILY
Qty: 60 TABLET | Refills: 5 | Status: SHIPPED | OUTPATIENT
Start: 2025-02-03

## 2025-02-03 NOTE — TELEPHONE ENCOUNTER
Patient is requesting medication refill. Please approve or deny this request.    Rx requested:  Requested Prescriptions     Pending Prescriptions Disp Refills    EQ MUCUS RELIEF 600 MG extended release tablet [Pharmacy Med Name: EQ Mucus Relief 600 MG Oral Tablet Extended Release 12 Hour] 60 tablet 0     Sig: Take 1 tablet by mouth twice daily         Last Office Visit:   1/22/2025      Next Visit Date:  Future Appointments   Date Time Provider Department Center   2/28/2025 12:00 PM Mini Wiseman PA-C [facility] ECC DEP   4/7/2025 11:00 AM [facility] ROOM 2 [facility] Fac RAD   4/28/2025 12:15 PM Gómez Robert MD [facility]

## 2025-02-20 ENCOUNTER — OFFICE VISIT (OUTPATIENT)
Age: 75
End: 2025-02-20
Payer: MEDICARE

## 2025-02-20 VITALS
BODY MASS INDEX: 18.66 KG/M2 | OXYGEN SATURATION: 96 % | TEMPERATURE: 96.6 F | WEIGHT: 112 LBS | HEIGHT: 65 IN | RESPIRATION RATE: 15 BRPM | SYSTOLIC BLOOD PRESSURE: 140 MMHG | HEART RATE: 76 BPM | DIASTOLIC BLOOD PRESSURE: 80 MMHG

## 2025-02-20 DIAGNOSIS — J34.89 RHINORRHEA: ICD-10-CM

## 2025-02-20 DIAGNOSIS — R09.82 POSTNASAL DRIP: ICD-10-CM

## 2025-02-20 DIAGNOSIS — K21.9 LARYNGOPHARYNGEAL REFLUX (LPR): ICD-10-CM

## 2025-02-20 DIAGNOSIS — R05.3 CHRONIC COUGH: Primary | ICD-10-CM

## 2025-02-20 PROCEDURE — 31575 DIAGNOSTIC LARYNGOSCOPY: CPT | Performed by: STUDENT IN AN ORGANIZED HEALTH CARE EDUCATION/TRAINING PROGRAM

## 2025-02-20 PROCEDURE — 3079F DIAST BP 80-89 MM HG: CPT | Performed by: STUDENT IN AN ORGANIZED HEALTH CARE EDUCATION/TRAINING PROGRAM

## 2025-02-20 PROCEDURE — 3077F SYST BP >= 140 MM HG: CPT | Performed by: STUDENT IN AN ORGANIZED HEALTH CARE EDUCATION/TRAINING PROGRAM

## 2025-02-20 PROCEDURE — 99203 OFFICE O/P NEW LOW 30 MIN: CPT | Performed by: STUDENT IN AN ORGANIZED HEALTH CARE EDUCATION/TRAINING PROGRAM

## 2025-02-20 PROCEDURE — 1123F ACP DISCUSS/DSCN MKR DOCD: CPT | Performed by: STUDENT IN AN ORGANIZED HEALTH CARE EDUCATION/TRAINING PROGRAM

## 2025-02-20 RX ORDER — OMEPRAZOLE 40 MG/1
40 CAPSULE, DELAYED RELEASE ORAL
Qty: 30 CAPSULE | Refills: 3 | Status: SHIPPED | OUTPATIENT
Start: 2025-02-20 | End: 2025-02-25

## 2025-02-20 NOTE — PROGRESS NOTES
stridor.   Lymphadenopathy:      Cervical: No cervical adenopathy.   Skin:     General: Skin is warm and dry.   Neurological:      General: No focal deficit present.      Mental Status: She is alert and oriented to person, place, and time.      Cranial Nerves: No cranial nerve deficit.   Psychiatric:         Mood and Affect: Mood and affect normal.         Behavior: Behavior normal.       Procedure : Flexible Nasolaryngoscopy     Verbal consent was obtained from the patient. Afrin and 4% topical lidocaine was applied to bilateral nares. After sufficient time for anesthesia, a flexible fiberoptic nasolaryngoscope was inserted into the patient's right naris. The scope was advanced through the nasal cavity fully visualizing the nasal cavity, nasopharynx, oropharynx, larynx, and hypopharynx. All structures were visualized and normal with significant findings as detailed below:     Vocal cords mobile bilaterally  Moderate interarytenoid /postcricoid edema  Airway patent      Side effects, adverse effects of the medication prescribed today, as well as treatment plan/ rationale and result expectations have been discussed with the patient who expresses understanding and desires to proceed.  Close follow up to evaluate treatment results and for coordination of care.    I have reviewed the patient's medical history in detail and updated the computerized patient record.      Caryl Cardoza MD

## 2025-02-24 ENCOUNTER — TELEPHONE (OUTPATIENT)
Age: 75
End: 2025-02-24

## 2025-02-25 RX ORDER — FAMOTIDINE 20 MG/1
20 TABLET, FILM COATED ORAL 2 TIMES DAILY
Qty: 60 TABLET | Refills: 3 | Status: SHIPPED | OUTPATIENT
Start: 2025-02-25

## 2025-02-28 ENCOUNTER — OFFICE VISIT (OUTPATIENT)
Age: 75
End: 2025-02-28

## 2025-02-28 VITALS
SYSTOLIC BLOOD PRESSURE: 112 MMHG | DIASTOLIC BLOOD PRESSURE: 78 MMHG | OXYGEN SATURATION: 99 % | TEMPERATURE: 97.8 F | WEIGHT: 112 LBS | BODY MASS INDEX: 18.66 KG/M2 | HEART RATE: 71 BPM | HEIGHT: 65 IN

## 2025-02-28 DIAGNOSIS — K21.9 GASTROESOPHAGEAL REFLUX DISEASE, UNSPECIFIED WHETHER ESOPHAGITIS PRESENT: ICD-10-CM

## 2025-02-28 DIAGNOSIS — M81.0 AGE-RELATED OSTEOPOROSIS WITHOUT CURRENT PATHOLOGICAL FRACTURE: ICD-10-CM

## 2025-02-28 DIAGNOSIS — Z13.220 LIPID SCREENING: ICD-10-CM

## 2025-02-28 DIAGNOSIS — Z00.00 MEDICARE ANNUAL WELLNESS VISIT, SUBSEQUENT: ICD-10-CM

## 2025-02-28 DIAGNOSIS — R13.10 DYSPHAGIA, UNSPECIFIED TYPE: ICD-10-CM

## 2025-02-28 DIAGNOSIS — R73.9 HYPERGLYCEMIA: ICD-10-CM

## 2025-02-28 DIAGNOSIS — I48.20 CHRONIC ATRIAL FIBRILLATION (HCC): Primary | ICD-10-CM

## 2025-02-28 DIAGNOSIS — F41.1 GENERALIZED ANXIETY DISORDER: ICD-10-CM

## 2025-02-28 DIAGNOSIS — I10 ESSENTIAL HYPERTENSION: ICD-10-CM

## 2025-02-28 DIAGNOSIS — E03.9 ACQUIRED HYPOTHYROIDISM: ICD-10-CM

## 2025-02-28 DIAGNOSIS — Z12.31 SCREENING MAMMOGRAM, ENCOUNTER FOR: ICD-10-CM

## 2025-02-28 DIAGNOSIS — R63.4 UNINTENTIONAL WEIGHT LOSS: ICD-10-CM

## 2025-02-28 DIAGNOSIS — Z72.0 TOBACCO ABUSE: ICD-10-CM

## 2025-02-28 DIAGNOSIS — J43.9 PULMONARY EMPHYSEMA, UNSPECIFIED EMPHYSEMA TYPE (HCC): ICD-10-CM

## 2025-02-28 ASSESSMENT — ENCOUNTER SYMPTOMS
CHOKING: 1
NAUSEA: 0
WHEEZING: 0
VOMITING: 0
BLOOD IN STOOL: 0
GASTROINTESTINAL NEGATIVE: 1
SHORTNESS OF BREATH: 0
DIARRHEA: 0
ABDOMINAL PAIN: 0
CONSTIPATION: 0

## 2025-02-28 ASSESSMENT — PATIENT HEALTH QUESTIONNAIRE - PHQ9
SUM OF ALL RESPONSES TO PHQ QUESTIONS 1-9: 0
SUM OF ALL RESPONSES TO PHQ QUESTIONS 1-9: 0
1. LITTLE INTEREST OR PLEASURE IN DOING THINGS: NOT AT ALL
SUM OF ALL RESPONSES TO PHQ QUESTIONS 1-9: 0
SUM OF ALL RESPONSES TO PHQ QUESTIONS 1-9: 0
SUM OF ALL RESPONSES TO PHQ9 QUESTIONS 1 & 2: 0
2. FEELING DOWN, DEPRESSED OR HOPELESS: NOT AT ALL

## 2025-02-28 ASSESSMENT — LIFESTYLE VARIABLES
HOW MANY STANDARD DRINKS CONTAINING ALCOHOL DO YOU HAVE ON A TYPICAL DAY: PATIENT DOES NOT DRINK
HOW OFTEN DO YOU HAVE A DRINK CONTAINING ALCOHOL: NEVER

## 2025-02-28 NOTE — PATIENT INSTRUCTIONS
These techniques combine exercise and meditation. You may need some training at first to learn them.  Do something you enjoy. For example, listen to music or go to a movie. Practice your hobby or do volunteer work.  Meditate. This can help you relax, because you are not worrying about what happened before or what may happen in the future.  Do guided imagery. Imagine yourself in any setting that helps you feel calm. You can use online videos, books, or a teacher to guide you.  Do breathing exercises. For example:  From a standing position, bend forward from the waist with your knees slightly bent. Let your arms dangle close to the floor.  Breathe in slowly and deeply as you return to a standing position. Roll up slowly and lift your head last.  Hold your breath for just a few seconds in the standing position.  Breathe out slowly and bend forward from the waist.  Let your feelings out. Talk, laugh, cry, and express anger when you need to. Talking with supportive friends or family, a counselor, or a lucy leader about your feelings is a healthy way to relieve stress. Avoid discussing your feelings with people who make you feel worse.  Write. It may help to write about things that are bothering you. This helps you find out how much stress you feel and what is causing it. When you know this, you can find better ways to cope.  What can you do to prevent stress?  You might try some of these things to help prevent stress:  Manage your time. This helps you find time to do the things you want and need to do.  Get enough sleep. Your body recovers from the stresses of the day while you are sleeping.  Get support. Your family, friends, and community can make a difference in how you experience stress.  Limit your news feed. Avoid or limit time on social media or news that may make you feel stressed.  Do something active. Exercise or activity can help reduce stress. Walking is a great way to get started.  Where can you learn

## 2025-02-28 NOTE — PROGRESS NOTES
Subjective  Erlinda Alvarenga, 74 y.o. female presents today with:  Chief Complaint   Patient presents with    Follow-up     Patient presents today for 6 month f/up. Patient is concern she has been losing to much weight.     Medicare AWV       HPI  Patient is here for 6-month follow-up on chronic concerns of hypothyroidism no longer followed by endocrinology (managed here)  COPD A-fib anxiety hypertension  Followed by cardiology (cho md)   Patient is also followed by pulmonology at [facility]  Has history of IgG deficiency-had consult with immunology at [facility] who states she is not a candidate for infusion  She has had recurrent cough chest congestion clear mucus production for the past 6 months  History of COPD/emphysema continues to smoke a pack per day  Had ENT consult laryngoscopy indicating reflux-started on Pepcid states her cough has improved but not resolved with use  Complains of unintentional  weight loss-she has never been a big eater but her appetite is less complains of dysphagia to liquids and solids    Review of Systems   Constitutional:  Positive for appetite change and unexpected weight change.   HENT:  Positive for postnasal drip.    Respiratory:  Positive for choking. Negative for shortness of breath and wheezing.    Cardiovascular: Negative.    Gastrointestinal: Negative.  Negative for abdominal pain, blood in stool, constipation, diarrhea, nausea and vomiting.   Genitourinary: Negative.    Musculoskeletal: Negative.    Skin: Negative.    Hematological: Negative.    Psychiatric/Behavioral: Negative.     All other systems reviewed and are negative.        Past Medical History:   Diagnosis Date    A-fib (HCC)     Anxiety     Asthma     Cancer (HCC) 2018    squamous cell- eye brow - CC    Cancer of the skin, basal cell     Chronic constipation     Colon polyps     COPD (chronic obstructive pulmonary disease) (HCC)     Cyst of nipple     Endometriosis     Fibromyalgia     Goiter

## 2025-03-01 DIAGNOSIS — E03.9 ACQUIRED HYPOTHYROIDISM: ICD-10-CM

## 2025-03-01 DIAGNOSIS — I10 ESSENTIAL HYPERTENSION: ICD-10-CM

## 2025-03-01 DIAGNOSIS — Z13.220 LIPID SCREENING: ICD-10-CM

## 2025-03-01 LAB
ALBUMIN SERPL-MCNC: 4.4 G/DL (ref 3.5–4.6)
ALP SERPL-CCNC: 98 U/L (ref 40–130)
ALT SERPL-CCNC: 18 U/L (ref 0–33)
ANION GAP SERPL CALCULATED.3IONS-SCNC: 12 MEQ/L (ref 9–15)
AST SERPL-CCNC: 21 U/L (ref 0–35)
BASOPHILS # BLD: 0 K/UL (ref 0–0.2)
BASOPHILS NFR BLD: 0.4 %
BILIRUB SERPL-MCNC: 0.3 MG/DL (ref 0.2–0.7)
BUN SERPL-MCNC: 11 MG/DL (ref 8–23)
CALCIUM SERPL-MCNC: 9 MG/DL (ref 8.5–9.9)
CHLORIDE SERPL-SCNC: 102 MEQ/L (ref 95–107)
CHOLEST SERPL-MCNC: 141 MG/DL (ref 0–199)
CO2 SERPL-SCNC: 27 MEQ/L (ref 20–31)
CREAT SERPL-MCNC: 0.55 MG/DL (ref 0.5–0.9)
EOSINOPHIL # BLD: 0 K/UL (ref 0–0.7)
EOSINOPHIL NFR BLD: 0.7 %
ERYTHROCYTE [DISTWIDTH] IN BLOOD BY AUTOMATED COUNT: 13.6 % (ref 11.5–14.5)
GLOBULIN SER CALC-MCNC: 2.4 G/DL (ref 2.3–3.5)
GLUCOSE SERPL-MCNC: 86 MG/DL (ref 70–99)
HCT VFR BLD AUTO: 42.9 % (ref 37–47)
HDLC SERPL-MCNC: 95 MG/DL (ref 40–59)
HGB BLD-MCNC: 14.4 G/DL (ref 12–16)
LDL CHOLESTEROL: 36 MG/DL (ref 0–129)
LYMPHOCYTES # BLD: 1 K/UL (ref 1–4.8)
LYMPHOCYTES NFR BLD: 17.6 %
MCH RBC QN AUTO: 31.4 PG (ref 27–31.3)
MCHC RBC AUTO-ENTMCNC: 33.6 % (ref 33–37)
MCV RBC AUTO: 93.5 FL (ref 79.4–94.8)
MONOCYTES # BLD: 0.7 K/UL (ref 0.2–0.8)
MONOCYTES NFR BLD: 12 %
NEUTROPHILS # BLD: 3.9 K/UL (ref 1.4–6.5)
NEUTS SEG NFR BLD: 69.1 %
PLATELET # BLD AUTO: 238 K/UL (ref 130–400)
POTASSIUM SERPL-SCNC: 3.8 MEQ/L (ref 3.4–4.9)
PROT SERPL-MCNC: 6.8 G/DL (ref 6.3–8)
RBC # BLD AUTO: 4.59 M/UL (ref 4.2–5.4)
SODIUM SERPL-SCNC: 141 MEQ/L (ref 135–144)
TRIGLYCERIDE, FASTING: 49 MG/DL (ref 0–150)
TSH REFLEX: 1.46 UIU/ML (ref 0.44–3.86)
WBC # BLD AUTO: 5.7 K/UL (ref 4.8–10.8)

## 2025-03-06 ENCOUNTER — OFFICE VISIT (OUTPATIENT)
Dept: GASTROENTEROLOGY | Age: 75
End: 2025-03-06
Payer: MEDICARE

## 2025-03-06 ENCOUNTER — PREP FOR PROCEDURE (OUTPATIENT)
Dept: GASTROENTEROLOGY | Age: 75
End: 2025-03-06

## 2025-03-06 VITALS
DIASTOLIC BLOOD PRESSURE: 70 MMHG | OXYGEN SATURATION: 98 % | BODY MASS INDEX: 19.3 KG/M2 | SYSTOLIC BLOOD PRESSURE: 116 MMHG | WEIGHT: 116 LBS | HEART RATE: 73 BPM

## 2025-03-06 DIAGNOSIS — R13.10 DYSPHAGIA, UNSPECIFIED TYPE: ICD-10-CM

## 2025-03-06 DIAGNOSIS — K21.9 CHRONIC GASTROESOPHAGEAL REFLUX DISEASE WITHOUT ESOPHAGITIS: ICD-10-CM

## 2025-03-06 DIAGNOSIS — K21.9 GASTROESOPHAGEAL REFLUX DISEASE WITHOUT ESOPHAGITIS: Primary | ICD-10-CM

## 2025-03-06 PROCEDURE — 3074F SYST BP LT 130 MM HG: CPT | Performed by: INTERNAL MEDICINE

## 2025-03-06 PROCEDURE — 1123F ACP DISCUSS/DSCN MKR DOCD: CPT | Performed by: INTERNAL MEDICINE

## 2025-03-06 PROCEDURE — 99204 OFFICE O/P NEW MOD 45 MIN: CPT | Performed by: INTERNAL MEDICINE

## 2025-03-06 PROCEDURE — 3078F DIAST BP <80 MM HG: CPT | Performed by: INTERNAL MEDICINE

## 2025-03-06 PROCEDURE — 1159F MED LIST DOCD IN RCRD: CPT | Performed by: INTERNAL MEDICINE

## 2025-03-06 NOTE — PROGRESS NOTES
Pressure Mother     Heart Disease Father 50    High Blood Pressure Maternal Grandmother     High Blood Pressure Paternal Grandmother     Heart Attack Neg Hx     Hypertension Neg Hx     Diabetes Neg Hx     Breast Cancer Neg Hx      Allergies   Allergen Reactions    Adhesive Tape Rash    Morphine      Tremor    Amoxil [Amoxicillin]      diarrhea    Cefdinir Itching and Other (See Comments)    Fosamax [Alendronate] Other (See Comments)     Muscle aches      Pantoprazole Other (See Comments)     abdominal cramping, uncontrolled BM.     Simvastatin      muscle soreness.    Zetia [Ezetimibe]     Zocor [Simvastatin - High Dose]     Mupirocin Rash         Review of Systems   Constitutional:  Negative for appetite change, chills, fatigue, fever and unexpected weight change.   HENT:  Positive for trouble swallowing. Negative for nosebleeds, tinnitus and voice change.    Eyes:  Negative for photophobia, pain and redness.   Respiratory:  Positive for cough. Negative for chest tightness, shortness of breath and wheezing.    Cardiovascular:  Negative for chest pain, palpitations and leg swelling.   Gastrointestinal:  Negative for abdominal distention, abdominal pain, blood in stool, constipation, diarrhea, nausea, rectal pain and vomiting.   Endocrine: Negative for polydipsia, polyphagia and polyuria.   Genitourinary:  Negative for difficulty urinating and hematuria.   Skin:  Negative for color change, pallor and rash.   Neurological:  Negative for dizziness, speech difficulty and headaches.   Psychiatric/Behavioral:  Negative for confusion and suicidal ideas.        Objective:   /70 (BP Site: Right Upper Arm, Patient Position: Sitting, BP Cuff Size: Small Adult)   Pulse 73   Wt 52.6 kg (116 lb)   LMP 01/09/1995   SpO2 98%   BMI 19.30 kg/m²     Physical Exam  Constitutional:       General: She is not in acute distress.     Appearance: She is well-developed.   HENT:      Head: Normocephalic and atraumatic.   Eyes:

## 2025-03-07 RX ORDER — SODIUM CHLORIDE 9 MG/ML
INJECTION, SOLUTION INTRAVENOUS CONTINUOUS
Status: CANCELLED | OUTPATIENT
Start: 2025-03-07

## 2025-03-07 RX ORDER — SODIUM CHLORIDE 0.9 % (FLUSH) 0.9 %
5-40 SYRINGE (ML) INJECTION EVERY 12 HOURS SCHEDULED
Status: CANCELLED | OUTPATIENT
Start: 2025-03-07

## 2025-03-07 RX ORDER — SODIUM CHLORIDE 0.9 % (FLUSH) 0.9 %
5-40 SYRINGE (ML) INJECTION PRN
Status: CANCELLED | OUTPATIENT
Start: 2025-03-07

## 2025-03-07 RX ORDER — SODIUM CHLORIDE 9 MG/ML
INJECTION, SOLUTION INTRAVENOUS PRN
Status: CANCELLED | OUTPATIENT
Start: 2025-03-07

## 2025-03-08 ASSESSMENT — ENCOUNTER SYMPTOMS
CHEST TIGHTNESS: 0
EYE REDNESS: 0
EYE PAIN: 0
COUGH: 1
TROUBLE SWALLOWING: 1
SHORTNESS OF BREATH: 0
BLOOD IN STOOL: 0
NAUSEA: 0
WHEEZING: 0
VOICE CHANGE: 0
PHOTOPHOBIA: 0
CONSTIPATION: 0
DIARRHEA: 0
ABDOMINAL DISTENTION: 0
ABDOMINAL PAIN: 0
RECTAL PAIN: 0
VOMITING: 0
COLOR CHANGE: 0

## 2025-03-13 ENCOUNTER — HOSPITAL ENCOUNTER (OUTPATIENT)
Dept: SPEECH THERAPY | Age: 75
Setting detail: THERAPIES SERIES
Discharge: HOME | End: 2025-03-13
Attending: INTERNAL MEDICINE
Payer: MEDICARE

## 2025-03-13 ENCOUNTER — HOSPITAL ENCOUNTER (OUTPATIENT)
Dept: GENERAL RADIOLOGY | Age: 75
Discharge: HOME | End: 2025-03-15
Attending: INTERNAL MEDICINE
Payer: MEDICARE

## 2025-03-13 DIAGNOSIS — R13.10 DYSPHAGIA, UNSPECIFIED TYPE: ICD-10-CM

## 2025-03-13 PROCEDURE — 92611 MOTION FLUOROSCOPY/SWALLOW: CPT

## 2025-03-13 PROCEDURE — 74230 X-RAY XM SWLNG FUNCJ C+: CPT

## 2025-03-13 PROCEDURE — 2500000003 HC RX 250 WO HCPCS: Performed by: INTERNAL MEDICINE

## 2025-03-13 RX ADMIN — BARIUM SULFATE 80 ML: 400 SUSPENSION ORAL at 13:21

## 2025-03-13 RX ADMIN — BARIUM SULFATE 50 G: 0.81 POWDER, FOR SUSPENSION ORAL at 13:21

## 2025-03-13 NOTE — PROCEDURES
[facility]   Facility/Department: [facility] SPEECH THERAPY  Speech Language Pathology  Modified Barium Swallow (MBS)    NAME:Erlinda Alvarenga  : 1950 (74 y.o.)   [x]   confirmed    MRN: [id number]    Referred by  Dr. William Galvez  PATIENT DIAGNOSIS(ES): Dysphagia, unspecified type [R13.10]  No chief complaint on file.    Patient Active Problem List    Diagnosis Date Noted    Angina effort     Claudication 2023    Osteopenia 2021    Chronic gastroesophageal reflux disease without esophagitis 2025    Dysphagia 2025    Bilateral epiphora 2024    Arthralgia of elbow 2024    Vaginal stenosis 2023    Vestibulitis of vagina 2023    Lung nodules 2023    Centrilobular emphysema (HCC) 2023    Cancer of the skin, basal cell 2023    Blister (nonthermal), left great toe, initial encounter 2021    Simple chronic bronchitis (HCC) 2019    Atrial fibrillation (HCC) 2019    Atherosclerotic heart disease of native coronary artery with other forms of angina pectoris 2019    Hyperlipidemia 2019    Bilateral carotid artery stenosis 2019    Chest pain 2019    Bilateral carotid bruits 2019    Essential hypertension 2019    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

## 2025-03-17 DIAGNOSIS — F51.04 CHRONIC INSOMNIA: ICD-10-CM

## 2025-03-17 DIAGNOSIS — J01.00 ACUTE MAXILLARY SINUSITIS, RECURRENCE NOT SPECIFIED: ICD-10-CM

## 2025-03-17 DIAGNOSIS — F41.9 ANXIETY: ICD-10-CM

## 2025-03-17 RX ORDER — ALPRAZOLAM 0.5 MG
0.5 TABLET ORAL DAILY PRN
Qty: 90 TABLET | Refills: 0 | Status: SHIPPED | OUTPATIENT
Start: 2025-03-17 | End: 2025-06-15

## 2025-03-17 RX ORDER — CETIRIZINE HYDROCHLORIDE 10 MG/1
10 TABLET ORAL NIGHTLY
Qty: 30 TABLET | Refills: 5 | Status: SHIPPED | OUTPATIENT
Start: 2025-03-17

## 2025-03-17 RX ORDER — BENZONATATE 200 MG/1
200 CAPSULE ORAL 3 TIMES DAILY PRN
Qty: 30 CAPSULE | Refills: 3 | Status: SHIPPED | OUTPATIENT
Start: 2025-03-17

## 2025-03-17 RX ORDER — CITALOPRAM HYDROBROMIDE 40 MG/1
40 TABLET ORAL DAILY
Qty: 90 TABLET | Refills: 1 | Status: SHIPPED | OUTPATIENT
Start: 2025-03-17

## 2025-03-17 NOTE — TELEPHONE ENCOUNTER
Please approve or deny request. Thank you!    Rx requested:  Requested Prescriptions     Pending Prescriptions Disp Refills    cetirizine (ZYRTEC) 10 MG tablet [Pharmacy Med Name: Cetirizine HCl 10 MG Oral Tablet] 30 tablet 0     Sig: Take 1 tablet by mouth nightly    benzonatate (TESSALON) 200 MG capsule [Pharmacy Med Name: Benzonatate 200 MG Oral Capsule] 30 capsule 0     Sig: Take 1 capsule by mouth three times daily as needed for cough    ALPRAZolam (XANAX) 0.5 MG tablet [Pharmacy Med Name: ALPRAZolam 0.5 MG Oral Tablet] 90 tablet 0     Sig: Take 1 tablet by mouth once daily for 90 days    citalopram (CELEXA) 40 MG tablet [Pharmacy Med Name: Citalopram Hydrobromide 40 MG Oral Tablet] 90 tablet 0     Sig: Take 1 tablet by mouth once daily         Last Office Visit:   2/28/2025      Next Visit Date:  Future Appointments   Date Time Provider Department Center   4/7/2025 11:00 AM [place] MAMMO ROOM 2 [facility] Fac RAD   4/14/2025 11:30 AM Caryl Cardoza MD [facility] [facility]   4/17/2025 11:30 AM Jenn Hwang, APRN - CNP [place] [facility]   4/28/2025 12:15 PM Gómez Robert MD [place] [facility]   5/12/2025 11:00 AM [place] BONE DENSITY RM 1 [facility] Fac RAD   9/29/2025 10:30 AM Mini Wiseman PA-C [facility] DEP

## 2025-03-19 DIAGNOSIS — I10 ESSENTIAL HYPERTENSION: ICD-10-CM

## 2025-03-19 RX ORDER — DILTIAZEM HCL 60 MG
60 TABLET ORAL 2 TIMES DAILY
Qty: 180 TABLET | Refills: 3 | Status: SHIPPED | OUTPATIENT
Start: 2025-03-19

## 2025-03-19 NOTE — TELEPHONE ENCOUNTER
Requesting medication refill. Please approve or deny this request.    Rx requested:  Requested Prescriptions     Pending Prescriptions Disp Refills    dilTIAZem (CARDIZEM) 60 MG immediate release tablet 180 tablet 3     Sig: Take 1 tablet by mouth 2 times daily         Last Office Visit:   10/14/2024      Next Visit Date:  Future Appointments   Date Time Provider Department Center   4/7/2025 11:00 AM [place] MAMMO ROOM 2 [facility] Fac RAD   4/14/2025 11:30 AM Caryl Cardoza MD [facility] [place] [facility]   4/17/2025 11:30 AM Jenn Hwang, APRN - CNP [place] [facility]   4/28/2025 12:15 PM Gómez Robert MD [place] Card [facility]   5/12/2025 11:00 AM [place] BONE DENSITY RM 1 [facility] Fac RAD   9/29/2025 10:30 AM Mini Wiseman PA-C [facility] DEP

## 2025-04-03 ENCOUNTER — ANESTHESIA EVENT (OUTPATIENT)
Dept: ENDOSCOPY | Age: 75
End: 2025-04-03
Payer: MEDICARE

## 2025-04-03 ASSESSMENT — LIFESTYLE VARIABLES: SMOKING_STATUS: 1

## 2025-04-03 ASSESSMENT — COPD QUESTIONNAIRES: CAT_SEVERITY: SEVERE

## 2025-04-04 ENCOUNTER — HOSPITAL ENCOUNTER (OUTPATIENT)
Age: 75
Setting detail: OUTPATIENT SURGERY
Discharge: HOME | End: 2025-04-04
Attending: INTERNAL MEDICINE | Admitting: INTERNAL MEDICINE
Payer: MEDICARE

## 2025-04-04 ENCOUNTER — ANESTHESIA (OUTPATIENT)
Dept: ENDOSCOPY | Age: 75
End: 2025-04-04
Payer: MEDICARE

## 2025-04-04 VITALS
OXYGEN SATURATION: 96 % | HEART RATE: 61 BPM | RESPIRATION RATE: 16 BRPM | BODY MASS INDEX: 18.66 KG/M2 | SYSTOLIC BLOOD PRESSURE: 133 MMHG | HEIGHT: 65 IN | WEIGHT: 112 LBS | TEMPERATURE: 97 F | DIASTOLIC BLOOD PRESSURE: 71 MMHG

## 2025-04-04 DIAGNOSIS — R13.10 DYSPHAGIA, UNSPECIFIED TYPE: ICD-10-CM

## 2025-04-04 DIAGNOSIS — K21.9 CHRONIC GASTROESOPHAGEAL REFLUX DISEASE WITHOUT ESOPHAGITIS: ICD-10-CM

## 2025-04-04 PROCEDURE — 88342 IMHCHEM/IMCYTCHM 1ST ANTB: CPT

## 2025-04-04 PROCEDURE — 7100000010 HC PHASE II RECOVERY - FIRST 15 MIN: Performed by: INTERNAL MEDICINE

## 2025-04-04 PROCEDURE — 2709999900 HC NON-CHARGEABLE SUPPLY: Performed by: INTERNAL MEDICINE

## 2025-04-04 PROCEDURE — 43239 EGD BIOPSY SINGLE/MULTIPLE: CPT | Performed by: INTERNAL MEDICINE

## 2025-04-04 PROCEDURE — 6360000002 HC RX W HCPCS

## 2025-04-04 PROCEDURE — 7100000011 HC PHASE II RECOVERY - ADDTL 15 MIN: Performed by: INTERNAL MEDICINE

## 2025-04-04 PROCEDURE — 88305 TISSUE EXAM BY PATHOLOGIST: CPT

## 2025-04-04 PROCEDURE — 3609017100 HC EGD: Performed by: INTERNAL MEDICINE

## 2025-04-04 PROCEDURE — 3700000000 HC ANESTHESIA ATTENDED CARE: Performed by: INTERNAL MEDICINE

## 2025-04-04 PROCEDURE — 2580000003 HC RX 258: Performed by: INTERNAL MEDICINE

## 2025-04-04 PROCEDURE — 2500000003 HC RX 250 WO HCPCS: Performed by: INTERNAL MEDICINE

## 2025-04-04 RX ORDER — SODIUM CHLORIDE 0.9 % (FLUSH) 0.9 %
5-40 SYRINGE (ML) INJECTION PRN
Status: DISCONTINUED | OUTPATIENT
Start: 2025-04-04 | End: 2025-04-04 | Stop reason: HOSPADM

## 2025-04-04 RX ORDER — SODIUM CHLORIDE 9 MG/ML
INJECTION, SOLUTION INTRAVENOUS CONTINUOUS
Status: DISCONTINUED | OUTPATIENT
Start: 2025-04-04 | End: 2025-04-04 | Stop reason: HOSPADM

## 2025-04-04 RX ORDER — LIDOCAINE HYDROCHLORIDE 20 MG/ML
INJECTION, SOLUTION INFILTRATION; PERINEURAL
Status: DISCONTINUED | OUTPATIENT
Start: 2025-04-04 | End: 2025-04-04 | Stop reason: SDUPTHER

## 2025-04-04 RX ORDER — SODIUM CHLORIDE 0.9 % (FLUSH) 0.9 %
5-40 SYRINGE (ML) INJECTION EVERY 12 HOURS SCHEDULED
Status: DISCONTINUED | OUTPATIENT
Start: 2025-04-04 | End: 2025-04-04 | Stop reason: HOSPADM

## 2025-04-04 RX ORDER — PROPOFOL 10 MG/ML
INJECTION, EMULSION INTRAVENOUS
Status: DISCONTINUED | OUTPATIENT
Start: 2025-04-04 | End: 2025-04-04 | Stop reason: SDUPTHER

## 2025-04-04 RX ORDER — SODIUM CHLORIDE 9 MG/ML
INJECTION, SOLUTION INTRAVENOUS PRN
Status: DISCONTINUED | OUTPATIENT
Start: 2025-04-04 | End: 2025-04-04 | Stop reason: HOSPADM

## 2025-04-04 RX ADMIN — PROPOFOL 30 MG: 10 INJECTION, EMULSION INTRAVENOUS at 10:53

## 2025-04-04 RX ADMIN — LIDOCAINE HYDROCHLORIDE 3 ML: 20 INJECTION, SOLUTION INFILTRATION; PERINEURAL at 10:45

## 2025-04-04 RX ADMIN — PROPOFOL 50 MG: 10 INJECTION, EMULSION INTRAVENOUS at 10:45

## 2025-04-04 RX ADMIN — SODIUM CHLORIDE: 0.9 INJECTION, SOLUTION INTRAVENOUS at 10:07

## 2025-04-04 ASSESSMENT — PAIN - FUNCTIONAL ASSESSMENT: PAIN_FUNCTIONAL_ASSESSMENT: 0-10

## 2025-04-04 NOTE — ANESTHESIA PRE PROCEDURE
Department of Anesthesiology  Preprocedure Note       Name:  Erlinda Alvarenga   Age:  74 y.o.  :  1950                                          MRN:  [id number]         Date:  4/3/2025      Surgeon: Surgeon(s):  William Galvez MD    Procedure: Procedure(s):  ESOPHAGOGASTRODUODENOSCOPY    Medications prior to admission:   Prior to Admission medications    Medication Sig Start Date End Date Taking? Authorizing Provider   dilTIAZem (CARDIZEM) 60 MG immediate release tablet Take 1 tablet by mouth 2 times daily 3/19/25   Gómez Robert MD   cetirizine (ZYRTEC) 10 MG tablet Take 1 tablet by mouth nightly 3/17/25   Mini Wiseman PA-C   benzonatate (TESSALON) 200 MG capsule Take 1 capsule by mouth three times daily as needed for cough 3/17/25   Mini Wiseman PA-C   ALPRAZolam (XANAX) 0.5 MG tablet Take 1 tablet by mouth daily as needed for Anxiety for up to 90 days. Max Daily Amount: 0.5 mg 3/17/25 6/15/25  Mini Wiseman PA-C   citalopram (CELEXA) 40 MG tablet Take 1 tablet by mouth once daily 3/17/25   Mini Wiseman PA-C   famotidine (PEPCID) 20 MG tablet Take 1 tablet by mouth 2 times daily 25   Caryl Cardoza MD   guaiFENesin (EQ MUCUS RELIEF) 600 MG extended release tablet Take 1 tablet by mouth twice daily 2/3/25   Mini Wiseman PA-C   azelastine (ASTEPRO) 137 MCG/SPRAY nasal spray 2 sprays by Nasal route 2 times daily 25   Mini Wiseman PA-C   mirabegron (MYRBETRIQ) 50 MG TB24 Take 1 tablet by mouth once daily 25   Luna Lester MD   Estradiol (YUVAFEM) 10 MCG TABS vaginal tablet PLACE 1 TABLET VAGINALLY THREE TIMES A WEEK 25   Luna Lester MD   montelukast (SINGULAIR) 10 MG tablet Take 1 tablet by mouth nightly 1/3/25   Provider, MD Siddharth   levothyroxine (SYNTHROID) 50 MCG tablet Take 2 tablets by mouth on tues and thurs 2 1/2 by mouth remainder of week 25   Mini Wiseman PA-C   budesonide (RINOCORT AQUA) 32

## 2025-04-04 NOTE — H&P
Patient Name: Erlinda Alvarenga  : 1950  MRN: [id number]  DATE: 25      ENDOSCOPY  History and Physical    Procedure:    [] Diagnostic Colonoscopy       [] Screening Colonoscopy  [x] EGD      [] ERCP      [] EUS       [] Other    [x] Previous office notes/History and Physical reviewed from the patients chart. Please see EMR for further details of HPI. I have examined the patient's status immediately prior to the procedure and:      Indications/HPI:    []Abdominal Pain   []Cancer- GI/Lung  []Fhx of colon CA/polyps  []History of Polyps   []Kenyon’s   []Melena  []Abnormal Imaging   []Dysphagia    []Persistent Pneumonia  []Anemia   []Food Impaction  []History of Polyps  []GI Bleed   []Pulmonary nodule/Mass  []Change in bowel habits  [x]Heartburn/Reflux  []Rectal Bleed (BRBPR)  []Chest Pain - Non Cardiac  []Heme (+) Stool  []Ulcers  []Constipation   []Hemoptysis   []Varices  []Diarrhea   []Hypoxemia  []Nausea/Vomiting   []Screening   []Crohns/Colitis  []Other:    Anesthesia:   [x] MAC [] Moderate Sedation   [] General   [] None     ROS: 12 pt Review of Symptoms was negative unless mentioned above    Medications:   Prior to Admission medications    Medication Sig Start Date End Date Taking? Authorizing Provider   dilTIAZem (CARDIZEM) 60 MG immediate release tablet Take 1 tablet by mouth 2 times daily 3/19/25   Gómez Robert MD   cetirizine (ZYRTEC) 10 MG tablet Take 1 tablet by mouth nightly 3/17/25   Mini Wiseman PA-C   benzonatate (TESSALON) 200 MG capsule Take 1 capsule by mouth three times daily as needed for cough 3/17/25   Mini Wiseman PA-C   ALPRAZolam (XANAX) 0.5 MG tablet Take 1 tablet by mouth daily as needed for Anxiety for up to 90 days. Max Daily Amount: 0.5 mg 3/17/25 6/15/25  Mini Wiseman PA-C   citalopram (CELEXA) 40 MG tablet Take 1 tablet by mouth once daily 3/17/25   Mini Wiseman PA-C   famotidine (PEPCID) 20 MG tablet Take 1 tablet by mouth 2 times      Tobacco Use    Smoking status: Some Days     Current packs/day: 1.00     Average packs/day: 1 pack/day for 59.3 years (59.3 ttl pk-yrs)     Types: Cigarettes     Start date: 1966    Smokeless tobacco: Never    Tobacco comments:     9/23/19 now smoking 1 cig twice per day, had quit for 7 years at one point   Substance Use Topics    Alcohol use: Yes     Comment: socially    Drug use: No       Vital Signs:   There were no vitals filed for this visit.     Physical Exam:  Cardiac:  [x]WNL  []Comments:  Pulmonary:  [x]WNL   []Comments:   Neuro/Mental Status:  [x]WNL  []Comments:  Abdominal:  [x]WNL    []Comments:  Other:   []WNL  []Comments:    Informed Consent:  The risks and benefits of the procedure have been discussed with either the patient or if they cannot consent, their representative.    Assessment:  Patient examined and appropriate for planned sedation and procedure.     Plan:  Proceed with planned sedation and procedure as above.    William Galvez MD  9:55 AM

## 2025-04-04 NOTE — ANESTHESIA POSTPROCEDURE EVALUATION
Department of Anesthesiology  Postprocedure Note    Patient: Erlinda Alvarenga  MRN: [id number]  YOB: 1950  Date of evaluation: 4/4/2025    Procedure Summary       Date: 04/04/25 Room / Location: [facility] OR 01 / [facility]    Anesthesia Start: 1041 Anesthesia Stop: 1055    Procedure: ESOPHAGOGASTRODUODENOSCOPY WITH BIOPSIES Diagnosis:       Chronic gastroesophageal reflux disease without esophagitis      Dysphagia, unspecified type      (Chronic gastroesophageal reflux disease without esophagitis [K21.9])      (Dysphagia, unspecified type [R13.10])    Surgeons: William Galvez MD Responsible Provider: Renee Sam APRN - CRNA    Anesthesia Type: MAC ASA Status: 3            Anesthesia Type: No value filed.    Emily Phase I: Emily Score: 10    Emily Phase II:      Anesthesia Post Evaluation    Patient location during evaluation: bedside  Patient participation: complete - patient participated  Level of consciousness: awake and awake and alert  Airway patency: patent  Nausea & Vomiting: no nausea and no vomiting  Cardiovascular status: blood pressure returned to baseline and hemodynamically stable  Respiratory status: acceptable  Hydration status: euvolemic  Pain management: adequate        No notable events documented.

## 2025-04-06 DIAGNOSIS — E78.5 HYPERLIPIDEMIA, UNSPECIFIED HYPERLIPIDEMIA TYPE: ICD-10-CM

## 2025-04-06 DIAGNOSIS — E78.2 MIXED HYPERLIPIDEMIA: ICD-10-CM

## 2025-04-07 ENCOUNTER — RESULTS FOLLOW-UP (OUTPATIENT)
Age: 75
End: 2025-04-07

## 2025-04-07 ENCOUNTER — HOSPITAL ENCOUNTER (OUTPATIENT)
Dept: WOMENS IMAGING | Age: 75
Discharge: HOME | End: 2025-04-09
Payer: MEDICARE

## 2025-04-07 DIAGNOSIS — Z12.31 SCREENING MAMMOGRAM, ENCOUNTER FOR: ICD-10-CM

## 2025-04-07 PROCEDURE — 77063 BREAST TOMOSYNTHESIS BI: CPT

## 2025-04-07 RX ORDER — EVOLOCUMAB 140 MG/ML
140 INJECTION, SOLUTION SUBCUTANEOUS
Qty: 2 ML | Refills: 5 | Status: SHIPPED | OUTPATIENT
Start: 2025-04-07

## 2025-04-07 NOTE — TELEPHONE ENCOUNTER
Requesting medication refill. Please approve or deny this request.    Rx requested:  Requested Prescriptions     Pending Prescriptions Disp Refills    REPATHA SOSY syringe [Pharmacy Med Name: REPATHA 140MG/ML PFS INJ] 2 mL 0     Sig: INJECT 1 SYRINGE SUBCUTANEOUSLY EVERY 14 DAYS         Last Office Visit:   10/14/2024      Next Visit Date:  Future Appointments   Date Time Provider Department Center   4/10/2025 12:30 PM Gómez Robert MD [place] [facility]   4/14/2025 11:30 AM Caryl Cardoza MD [facility]   4/17/2025 11:30 AM Jenn Hwang, APRN - CNP [place] [facility] [place]   5/12/2025 11:00 AM [place] BONE DENSITY RM 1 [facility] Fac RAD   9/29/2025 10:30 AM Mini Wiseman PA-C [facility] DEP

## 2025-04-07 NOTE — TELEPHONE ENCOUNTER
Called and spoke with patient. Relayed her results came back normal. She has no questions at this time.

## 2025-04-10 ENCOUNTER — OFFICE VISIT (OUTPATIENT)
Age: 75
End: 2025-04-10

## 2025-04-10 VITALS
DIASTOLIC BLOOD PRESSURE: 80 MMHG | WEIGHT: 112.6 LBS | BODY MASS INDEX: 18.74 KG/M2 | HEART RATE: 61 BPM | SYSTOLIC BLOOD PRESSURE: 130 MMHG | OXYGEN SATURATION: 97 %

## 2025-04-10 DIAGNOSIS — I25.10 CORONARY ARTERY DISEASE INVOLVING NATIVE CORONARY ARTERY OF NATIVE HEART WITHOUT ANGINA PECTORIS: ICD-10-CM

## 2025-04-10 DIAGNOSIS — I65.23 BILATERAL CAROTID ARTERY STENOSIS: ICD-10-CM

## 2025-04-10 DIAGNOSIS — E78.5 HYPERLIPIDEMIA, UNSPECIFIED HYPERLIPIDEMIA TYPE: ICD-10-CM

## 2025-04-10 DIAGNOSIS — Z00.00 ROUTINE ADULT HEALTH MAINTENANCE: Primary | ICD-10-CM

## 2025-04-10 DIAGNOSIS — E78.2 MIXED HYPERLIPIDEMIA: ICD-10-CM

## 2025-04-10 DIAGNOSIS — R06.09 DOE (DYSPNEA ON EXERTION): ICD-10-CM

## 2025-04-10 DIAGNOSIS — R07.9 CHEST PAIN, UNSPECIFIED TYPE: ICD-10-CM

## 2025-04-10 DIAGNOSIS — R06.02 SHORTNESS OF BREATH: ICD-10-CM

## 2025-04-10 DIAGNOSIS — I10 ESSENTIAL HYPERTENSION: ICD-10-CM

## 2025-04-10 ASSESSMENT — ENCOUNTER SYMPTOMS
SHORTNESS OF BREATH: 0
COUGH: 0
NAUSEA: 0
BLOOD IN STOOL: 0
CHEST TIGHTNESS: 0
STRIDOR: 0
WHEEZING: 0
GASTROINTESTINAL NEGATIVE: 1
RESPIRATORY NEGATIVE: 1
EYES NEGATIVE: 1

## 2025-04-10 NOTE — PROGRESS NOTES
Alcohol use: Yes     Comment: socially    Drug use: No    Sexual activity: Yes     Partners: Male     Social Drivers of Health     Financial Resource Strain: Low Risk  (3/19/2024)    Overall Financial Resource Strain (CARDIA)     Difficulty of Paying Living Expenses: Not hard at all   Food Insecurity: No Food Insecurity (1/22/2025)    Hunger Vital Sign     Worried About Running Out of Food in the Last Year: Never true     Ran Out of Food in the Last Year: Never true   Transportation Needs: No Transportation Needs (1/22/2025)    PRAPARE - Transportation     Lack of Transportation (Medical): No     Lack of Transportation (Non-Medical): No   Physical Activity: Insufficiently Active (2/28/2025)    Exercise Vital Sign     Days of Exercise per Week: 3 days     Minutes of Exercise per Session: 30 min   Housing Stability: Low Risk  (1/22/2025)    Housing Stability Vital Sign     Unable to Pay for Housing in the Last Year: No     Number of Times Moved in the Last Year: 0     Homeless in the Last Year: No       Allergies   Allergen Reactions    Adhesive Tape Rash    Morphine      Tremor    Amoxil [Amoxicillin]      diarrhea    Cefdinir Itching and Other (See Comments)    Fosamax [Alendronate] Other (See Comments)     Muscle aches      Pantoprazole Other (See Comments)     abdominal cramping, uncontrolled BM.     Simvastatin      muscle soreness.    Zetia [Ezetimibe]     Zocor [Simvastatin - High Dose]     Mupirocin Rash       Current Outpatient Medications   Medication Sig Dispense Refill    Evolocumab (REPATHA) SOSY syringe Inject 1 mL into the skin every 14 days 2 mL 5    dilTIAZem (CARDIZEM) 60 MG immediate release tablet Take 1 tablet by mouth 2 times daily 180 tablet 3    cetirizine (ZYRTEC) 10 MG tablet Take 1 tablet by mouth nightly 30 tablet 5    benzonatate (TESSALON) 200 MG capsule Take 1 capsule by mouth three times daily as needed for cough 30 capsule 3    ALPRAZolam (XANAX) 0.5 MG tablet Take 1 tablet by mouth

## 2025-04-14 ENCOUNTER — OFFICE VISIT (OUTPATIENT)
Age: 75
End: 2025-04-14
Payer: MEDICARE

## 2025-04-14 VITALS
TEMPERATURE: 96.6 F | WEIGHT: 122 LBS | HEIGHT: 65 IN | HEART RATE: 65 BPM | RESPIRATION RATE: 15 BRPM | DIASTOLIC BLOOD PRESSURE: 80 MMHG | BODY MASS INDEX: 20.33 KG/M2 | SYSTOLIC BLOOD PRESSURE: 145 MMHG | OXYGEN SATURATION: 97 %

## 2025-04-14 DIAGNOSIS — L29.9 EAR ITCHING: Primary | ICD-10-CM

## 2025-04-14 PROCEDURE — 3077F SYST BP >= 140 MM HG: CPT | Performed by: STUDENT IN AN ORGANIZED HEALTH CARE EDUCATION/TRAINING PROGRAM

## 2025-04-14 PROCEDURE — 99213 OFFICE O/P EST LOW 20 MIN: CPT | Performed by: STUDENT IN AN ORGANIZED HEALTH CARE EDUCATION/TRAINING PROGRAM

## 2025-04-14 PROCEDURE — 1123F ACP DISCUSS/DSCN MKR DOCD: CPT | Performed by: STUDENT IN AN ORGANIZED HEALTH CARE EDUCATION/TRAINING PROGRAM

## 2025-04-14 PROCEDURE — 3079F DIAST BP 80-89 MM HG: CPT | Performed by: STUDENT IN AN ORGANIZED HEALTH CARE EDUCATION/TRAINING PROGRAM

## 2025-04-14 NOTE — PROGRESS NOTES
[facility] [place] [facility], [facility] OTOLARYNGOLOGY  [address] [place] [address], [address]  [facility] [address]  Dept: [phone]  Dept Fax: [phone]  Loc: [phone]     4/14/2025    Visit type: Follow up    Reason for Visit: Follow-up       ASSESSMENT/PLAN   1. Ear itching    No follow-ups on file.  No orders of the defined types were placed in this encounter.    Sweet oil for ear itching/flaking   If not improved consider dermotic  Subjective    Patient: Erlinda Alvarenga is a 74 y.o. female   HPI:    Recall,   2/20/25  Endorses COPD and allergies and on and off cough since August - attributes it to the weather   Sinus infections few times per yr  In December had thick mucus treated with abx and steroid.   1 month later still had cough and treated with abx and steroid injection   Treats the infection      Coughs all night long   Causing voice changes      Ears are feeling plugged      Nasal medications - Rhinocort, Azelastine only at night, Cetirizine   Also has albuterol inhaler/nebulizer   Has seen Allergy and testing was reportedly negative - note reviewed  Endorses low immune system- last seen in November   Prior ENT note reviewed frm [facility]   Sees a Pulm      Scope with signs most c/w LPR/Reflux. Discussed PPI but possible interaction with Plavix so will instead start with Pepcid     Interval-   EGD with irregular Z line vs short segment Kenyon's, Patchy inflammation in gastric antrum/body. Recommended high dose PPI     Today,   History of Present Illness  GI, Cardiac, Pulm notes reviewed     Ear itching   Has fluctuating sinus symptoms  Cough improved     Continues taking Flonase 2 sprays each side daily, Azelastine 1 spray BID, and Cetirizine. NO saline     Objective     Vitals:    04/14/25 1117   BP: (!) 145/80   Pulse: 65   Resp: 15   Temp: (!) 96.6 °F (35.9 °C)   TempSrc: Temporal   SpO2: 97%   Weight: 55.3 kg (122 lb)   Height: 1.651 m (5' 5\")        Physical

## 2025-04-17 ENCOUNTER — OFFICE VISIT (OUTPATIENT)
Dept: GASTROENTEROLOGY | Age: 75
End: 2025-04-17
Payer: MEDICARE

## 2025-04-17 VITALS
SYSTOLIC BLOOD PRESSURE: 142 MMHG | WEIGHT: 113 LBS | BODY MASS INDEX: 18.8 KG/M2 | HEART RATE: 69 BPM | OXYGEN SATURATION: 98 % | DIASTOLIC BLOOD PRESSURE: 70 MMHG

## 2025-04-17 DIAGNOSIS — K22.70 BARRETT'S ESOPHAGUS WITHOUT DYSPLASIA: Primary | ICD-10-CM

## 2025-04-17 PROCEDURE — 1159F MED LIST DOCD IN RCRD: CPT | Performed by: NURSE PRACTITIONER

## 2025-04-17 PROCEDURE — 1123F ACP DISCUSS/DSCN MKR DOCD: CPT | Performed by: NURSE PRACTITIONER

## 2025-04-17 PROCEDURE — 3077F SYST BP >= 140 MM HG: CPT | Performed by: NURSE PRACTITIONER

## 2025-04-17 PROCEDURE — 3078F DIAST BP <80 MM HG: CPT | Performed by: NURSE PRACTITIONER

## 2025-04-17 PROCEDURE — 99213 OFFICE O/P EST LOW 20 MIN: CPT | Performed by: NURSE PRACTITIONER

## 2025-04-17 RX ORDER — ESOMEPRAZOLE MAGNESIUM 40 MG/1
40 CAPSULE, DELAYED RELEASE ORAL DAILY
Qty: 90 CAPSULE | Refills: 3 | Status: SHIPPED | OUTPATIENT
Start: 2025-04-17 | End: 2025-04-17 | Stop reason: ALTCHOICE

## 2025-04-17 RX ORDER — OMEPRAZOLE 40 MG/1
40 CAPSULE, DELAYED RELEASE ORAL
Qty: 90 CAPSULE | Refills: 3 | Status: SHIPPED | OUTPATIENT
Start: 2025-04-17 | End: 2026-04-12

## 2025-04-17 ASSESSMENT — ENCOUNTER SYMPTOMS
VOMITING: 0
CHEST TIGHTNESS: 0
SHORTNESS OF BREATH: 0
COLOR CHANGE: 0
BLOOD IN STOOL: 0
VOICE CHANGE: 0
ABDOMINAL PAIN: 0
EYE PAIN: 0
TROUBLE SWALLOWING: 0
NAUSEA: 0
ABDOMINAL DISTENTION: 0
WHEEZING: 0
DIARRHEA: 0
ANAL BLEEDING: 0
RECTAL PAIN: 0
EYE REDNESS: 0
CONSTIPATION: 0
PHOTOPHOBIA: 0

## 2025-04-17 NOTE — PROGRESS NOTES
Subjective:      Patient ID: Erlinda Alvarenga is a 74 y.o. female who presents today for:  Chief Complaint   Patient presents with    Follow Up After Procedure       HPI  Patient came in as follow-up to EGD for recurrent cough, despite H2 blocker.  Patient had EGD which had evidence of irregular Z-line and endoscopic evidence of Kenyon's esophagus with histological changes of Kenyon's esophagus without dysplasia, path was negative for H. pylori.  She has been on an H2 blocker and has avoided PPIs owing to side effects and concern for drug to drug interaction with citalopram.  She does report chronic heartburn symptoms recurrent cough, worsened symptoms at nighttime with no significant improvement with H2 blocker.        Background  This is a very pleasant 74-year-old who came in today for further evaluation and management. Patient reports that she been having persistent recurrent cough that was attributed or at least somewhat thought to be related to heartburn and GERD. Patient was initiated on Pepcid and she reports somewhat improvement of symptoms though persistent cough. At some point the cough was attributed to postnasal drip or other reason. She has been having symptoms of cough for the last few months. She does also report difficulty initiation swallow that could occur randomly. Denies coughing upon initiation of swallow. Otherwise no associated abdominal pain per se no nausea vomiting hematemesis melena hematochezia. Does have history of COPD and allergies and she been optimized and treated for postnasal drip as well. Patient also thought that she may have reflux laryngitis. Patient came in today for further evaluation and management     Past Medical History:   Diagnosis Date    A-fib (HCC)     Anxiety     Asthma     Cancer (HCC) 2018    squamous cell- eye brow - CC    Cancer of the skin, basal cell     Chronic constipation     Colon polyps     COPD (chronic obstructive pulmonary disease) (HCC)     Cyst of

## 2025-05-02 ENCOUNTER — TELEPHONE (OUTPATIENT)
Dept: PHARMACY | Facility: CLINIC | Age: 75
End: 2025-05-02

## 2025-05-02 NOTE — TELEPHONE ENCOUNTER
[facility] CLINICAL PHARMACY: ADHERENCE REVIEW  Identified care gap per [employer]: fills at [facility]: Statin adherence    ASSESSMENT  STATIN ADHERENCE    Insurance Records claims through 4/22/25 (Prior Year PDC = not reported; YTD PDC = 88%; Potential Fail Date: 06.15.25):   LIVALO   TAB 1MG last filled on 03.20.25 for 30 day supply. Next refill due: 04.19.25    Prescribed sig:  take 1/2 tablet daily    Per Insurer Portal: last filled on same    No pharmacy outreach    Lab Results   Component Value Date    CHOL 138 11/13/2019    TRIG 160 (H) 11/13/2019    HDL 95 (H) 03/01/2025     Lab Results   Component Value Date    LDL 36 03/01/2025      ALT   Date Value Ref Range Status   03/01/2025 18 0 - 33 U/L Final     AST   Date Value Ref Range Status   03/01/2025 21 0 - 35 U/L Final     The 10-year ASCVD risk score (Brandi SWANSON, et al., 2019) is: 30.8%    Values used to calculate the score:      Age: 74 years      Sex: Female      Is Non- : No      Diabetic: No      Tobacco smoker: Yes      Systolic Blood Pressure: 142 mmHg      Is BP treated: Yes      HDL Cholesterol: 95 mg/dL      Total Cholesterol: 141 mg/dL     The following are interventions that have been identified:   Patient overdue refilling LIVALO   TAB 1MG. Unsure if patient is still prescribed this medication.     No patient outreach planned at this time.  PCP dc'd on 04/10/25 pt will try Repatha    Last Visit: 02.28.25  Next Visit: 09.29.25        Debra Jhaveri CPhT  [facility] Clinical   [facility]  Toll Free: [phone] Option 1    For Pharmacy Admin Tracking Only    Program: [employer]  CPA in place:  No  Gap Closed?: Yes   Time Spent (min): 10

## 2025-05-07 RX ORDER — BUDESONIDE AND FORMOTEROL FUMARATE DIHYDRATE 160; 4.5 UG/1; UG/1
AEROSOL RESPIRATORY (INHALATION)
Qty: 11 G | Refills: 5 | Status: SHIPPED | OUTPATIENT
Start: 2025-05-07

## 2025-05-07 NOTE — TELEPHONE ENCOUNTER
Comments:     Last Office Visit (last PCP visit):   2/28/2025    Next Visit Date:  Future Appointments   Date Time Provider Department Center   5/12/2025 11:00 AM [place] BONE DENSITY RM 1 [facility] RAD   9/29/2025 10:30 AM [facility]   10/16/2025  1:00 PM Gómez Robert MD [place] [facility]       **If hasn't been seen in over a year OR hasn't followed up according to last diabetes/ADHD visit, make appointment for patient before sending refill to provider.    Rx requested:  Requested Prescriptions     Pending Prescriptions Disp Refills    budesonide-formoterol (SYMBICORT) 160-4.5 MCG/ACT AERO [Pharmacy Med Name: Budesonide-Formoterol Fumarate 160-4.5 MCG/ACT Inhalation Aerosol] 11 g 0     Sig: INHALE 2 PUFFS TWICE DAILY

## 2025-05-12 ENCOUNTER — HOSPITAL ENCOUNTER (OUTPATIENT)
Dept: WOMENS IMAGING | Age: 75
Discharge: HOME | End: 2025-05-14
Payer: MEDICARE

## 2025-05-12 ENCOUNTER — RESULTS FOLLOW-UP (OUTPATIENT)
Age: 75
End: 2025-05-12

## 2025-05-12 DIAGNOSIS — M81.0 AGE-RELATED OSTEOPOROSIS WITHOUT CURRENT PATHOLOGICAL FRACTURE: ICD-10-CM

## 2025-05-12 PROCEDURE — 77080 DXA BONE DENSITY AXIAL: CPT

## 2025-05-20 ENCOUNTER — TELEPHONE (OUTPATIENT)
Age: 75
End: 2025-05-20

## 2025-05-20 ENCOUNTER — OFFICE VISIT (OUTPATIENT)
Age: 75
End: 2025-05-20
Payer: MEDICARE

## 2025-05-20 VITALS
HEART RATE: 66 BPM | BODY MASS INDEX: 18.83 KG/M2 | TEMPERATURE: 97.6 F | SYSTOLIC BLOOD PRESSURE: 124 MMHG | HEIGHT: 65 IN | WEIGHT: 113 LBS | DIASTOLIC BLOOD PRESSURE: 78 MMHG | OXYGEN SATURATION: 96 %

## 2025-05-20 DIAGNOSIS — M81.0 AGE-RELATED OSTEOPOROSIS WITHOUT CURRENT PATHOLOGICAL FRACTURE: Primary | ICD-10-CM

## 2025-05-20 DIAGNOSIS — I10 ESSENTIAL HYPERTENSION: ICD-10-CM

## 2025-05-20 DIAGNOSIS — J44.9 CHRONIC OBSTRUCTIVE PULMONARY DISEASE, UNSPECIFIED COPD TYPE (HCC): ICD-10-CM

## 2025-05-20 DIAGNOSIS — E03.9 ACQUIRED HYPOTHYROIDISM: ICD-10-CM

## 2025-05-20 DIAGNOSIS — K21.9 GASTROESOPHAGEAL REFLUX DISEASE, UNSPECIFIED WHETHER ESOPHAGITIS PRESENT: ICD-10-CM

## 2025-05-20 DIAGNOSIS — E55.9 VITAMIN D DEFICIENCY: ICD-10-CM

## 2025-05-20 DIAGNOSIS — M81.0 AGE-RELATED OSTEOPOROSIS WITHOUT CURRENT PATHOLOGICAL FRACTURE: ICD-10-CM

## 2025-05-20 PROCEDURE — 3074F SYST BP LT 130 MM HG: CPT | Performed by: PHYSICIAN ASSISTANT

## 2025-05-20 PROCEDURE — 1123F ACP DISCUSS/DSCN MKR DOCD: CPT | Performed by: PHYSICIAN ASSISTANT

## 2025-05-20 PROCEDURE — 1159F MED LIST DOCD IN RCRD: CPT | Performed by: PHYSICIAN ASSISTANT

## 2025-05-20 PROCEDURE — 3078F DIAST BP <80 MM HG: CPT | Performed by: PHYSICIAN ASSISTANT

## 2025-05-20 PROCEDURE — 99214 OFFICE O/P EST MOD 30 MIN: CPT | Performed by: PHYSICIAN ASSISTANT

## 2025-05-20 RX ORDER — IBANDRONATE SODIUM 150 MG/1
150 TABLET, FILM COATED ORAL
Qty: 4 TABLET | Refills: 3 | Status: SHIPPED | OUTPATIENT
Start: 2025-05-20

## 2025-05-20 RX ORDER — DOCUSATE SODIUM 100 MG/1
100 CAPSULE, LIQUID FILLED ORAL 2 TIMES DAILY
COMMUNITY

## 2025-05-20 NOTE — TELEPHONE ENCOUNTER
Spoke with pharmacist made aware Rx is writing correctly. Pharmacist stated there are no issues with Rx.

## 2025-05-20 NOTE — TELEPHONE ENCOUNTER
Taqueria from [facility] states that Ibandromate is written incorrectly. Pharmacist states that medication is to be taken every month, not every week. Please call [facility] at [phone] to discuss.

## 2025-05-20 NOTE — PROGRESS NOTES
Subjective  Erlinda Alvarenga, 74 y.o. female presents today with:  Chief Complaint   Patient presents with    Results     Patient presents today to discuss bone density results and medication options.        HPI  Patient is here to discuss treatment for osteoporosis  She suffers with fibromyalgia as well states Fosamax worsened her muscle pain in the past  She also suffers with gastritis on omeprazole daily concerning will also worsen her bone density  States she does not perform resistance exercises due to her fibromyalgia  She is also concerned treatment for her osteoporosis will disrupt her thyroid levels  COPD stable no exacerbation  Hypertension well-controlled  Hypothyroidism stable on Synthroid  Review of Systems   All other systems reviewed and are negative.        Past Medical History:   Diagnosis Date    A-fib (HCC)     Anxiety     Asthma     Cancer (HCC) 2018    squamous cell- eye brow - CC    Cancer of the skin, basal cell     Chronic constipation     Colon polyps     COPD (chronic obstructive pulmonary disease) (HCC)     Cyst of nipple     Endometriosis     Fibromyalgia     Goiter     Goiter     Hyperlipidemia     Hypertension     Hypothyroidism     Osteoarthritis     Squamous cell carcinoma in situ (SCCIS) of skin of right lower leg 01/2024    TMJ (dislocation of temporomandibular joint)     TMJ arthritis      Past Surgical History:   Procedure Laterality Date    COLONOSCOPY      DIAGNOSTIC CARDIAC CATH LAB PROCEDURE  04/18/2019    THYROIDECTOMY  2017    Thaddeus negron  -cc    THYROIDECTOMY Left 01/06/2015    UPPER GASTROINTESTINAL ENDOSCOPY N/A 4/4/2025    ESOPHAGOGASTRODUODENOSCOPY WITH BIOPSIES performed by William Galvez MD at [facility]    VAGINA SURGERY N/A 1/25/2024    REVERSE INTROITOPLASTY VESTIBULECTOMY performed by Luna Lester MD at [facility] OR     Social History     Socioeconomic History    Marital status:      Spouse name: Not on file    Number of children: Not on file

## 2025-05-21 ENCOUNTER — RESULTS FOLLOW-UP (OUTPATIENT)
Age: 75
End: 2025-05-21

## 2025-05-21 LAB — VITAMIN D 25-HYDROXY: 37.1 NG/ML (ref 30–100)

## 2025-05-29 ENCOUNTER — TELEPHONE (OUTPATIENT)
Age: 75
End: 2025-05-29

## 2025-05-29 NOTE — TELEPHONE ENCOUNTER
The directions are correct as I stated if the pharmacy insert suggest waiting an hour after the Boniva then wait an hour then take the omeprazole

## 2025-05-29 NOTE — TELEPHONE ENCOUNTER
Patient called in to clarify how to take her new medication(boniva) what she was told by you does not match what the script says. She thought she was told by you to take her thyroid med then take the boniva 1 hour later then take the omprazole 30 minutes from the boniva all on empty stomach then may eat 30 minutes after the omeprazole but when she picked the the boniva it said to not take any med till 1 hour after taking the boniva.

## 2025-05-29 NOTE — TELEPHONE ENCOUNTER
Spoke with patient to clarify the time to take the medications.    She wants to confirm if its okay to eat after waiting 30minutes after she takes the omeprazole?

## 2025-06-14 DIAGNOSIS — F51.04 CHRONIC INSOMNIA: ICD-10-CM

## 2025-06-14 DIAGNOSIS — F41.9 ANXIETY: ICD-10-CM

## 2025-06-16 RX ORDER — ALPRAZOLAM 0.5 MG
TABLET ORAL
Qty: 90 TABLET | Refills: 0 | Status: SHIPPED | OUTPATIENT
Start: 2025-06-16 | End: 2025-09-14

## 2025-07-17 ENCOUNTER — OFFICE VISIT (OUTPATIENT)
Age: 75
End: 2025-07-17
Payer: MEDICARE

## 2025-07-17 VITALS
OXYGEN SATURATION: 94 % | SYSTOLIC BLOOD PRESSURE: 136 MMHG | HEART RATE: 75 BPM | BODY MASS INDEX: 18.3 KG/M2 | RESPIRATION RATE: 16 BRPM | DIASTOLIC BLOOD PRESSURE: 76 MMHG | WEIGHT: 110 LBS

## 2025-07-17 DIAGNOSIS — I48.91 ATRIAL FIBRILLATION, UNSPECIFIED TYPE (HCC): Primary | ICD-10-CM

## 2025-07-17 DIAGNOSIS — I10 HYPERTENSION, UNSPECIFIED TYPE: ICD-10-CM

## 2025-07-17 LAB
ANION GAP SERPL CALCULATED.3IONS-SCNC: 15 MEQ/L (ref 9–15)
BUN SERPL-MCNC: 9 MG/DL (ref 8–23)
CALCIUM SERPL-MCNC: 9 MG/DL (ref 8.5–9.9)
CHLORIDE SERPL-SCNC: 97 MEQ/L (ref 95–107)
CO2 SERPL-SCNC: 24 MEQ/L (ref 20–31)
CREAT SERPL-MCNC: 0.54 MG/DL (ref 0.5–0.9)
ERYTHROCYTE [DISTWIDTH] IN BLOOD BY AUTOMATED COUNT: 13 % (ref 11.5–14.5)
GLUCOSE SERPL-MCNC: 98 MG/DL (ref 70–99)
HCT VFR BLD AUTO: 43.5 % (ref 37–47)
HGB BLD-MCNC: 14.2 G/DL (ref 12–16)
MCH RBC QN AUTO: 30 PG (ref 27–31.3)
MCHC RBC AUTO-ENTMCNC: 32.6 % (ref 33–37)
MCV RBC AUTO: 92 FL (ref 79.4–94.8)
PLATELET # BLD AUTO: 229 K/UL (ref 130–400)
POTASSIUM SERPL-SCNC: 4.1 MEQ/L (ref 3.4–4.9)
RBC # BLD AUTO: 4.73 M/UL (ref 4.2–5.4)
SODIUM SERPL-SCNC: 136 MEQ/L (ref 135–144)
WBC # BLD AUTO: 6.7 K/UL (ref 4.8–10.8)

## 2025-07-17 PROCEDURE — 93000 ELECTROCARDIOGRAM COMPLETE: CPT | Performed by: INTERNAL MEDICINE

## 2025-07-17 PROCEDURE — 3078F DIAST BP <80 MM HG: CPT | Performed by: INTERNAL MEDICINE

## 2025-07-17 PROCEDURE — 1159F MED LIST DOCD IN RCRD: CPT | Performed by: INTERNAL MEDICINE

## 2025-07-17 PROCEDURE — 3075F SYST BP GE 130 - 139MM HG: CPT | Performed by: INTERNAL MEDICINE

## 2025-07-17 PROCEDURE — 1123F ACP DISCUSS/DSCN MKR DOCD: CPT | Performed by: INTERNAL MEDICINE

## 2025-07-17 PROCEDURE — 99214 OFFICE O/P EST MOD 30 MIN: CPT | Performed by: INTERNAL MEDICINE

## 2025-07-17 RX ORDER — SODIUM CHLORIDE 0.9 % (FLUSH) 0.9 %
5-40 SYRINGE (ML) INJECTION PRN
OUTPATIENT
Start: 2025-07-17

## 2025-07-17 RX ORDER — DIPHENHYDRAMINE HYDROCHLORIDE 50 MG/ML
50 INJECTION, SOLUTION INTRAMUSCULAR; INTRAVENOUS ONCE
OUTPATIENT
Start: 2025-07-17 | End: 2025-07-17

## 2025-07-17 RX ORDER — ONDANSETRON 2 MG/ML
4 INJECTION INTRAMUSCULAR; INTRAVENOUS EVERY 6 HOURS PRN
OUTPATIENT
Start: 2025-07-17

## 2025-07-17 RX ORDER — NITROGLYCERIN 0.4 MG/1
0.4 TABLET SUBLINGUAL EVERY 5 MIN PRN
OUTPATIENT
Start: 2025-07-17

## 2025-07-17 RX ORDER — HYDROCORTISONE SODIUM SUCCINATE 100 MG/2ML
200 INJECTION INTRAMUSCULAR; INTRAVENOUS ONCE
OUTPATIENT
Start: 2025-07-17 | End: 2025-07-17

## 2025-07-17 RX ORDER — SODIUM CHLORIDE 0.9 % (FLUSH) 0.9 %
5-40 SYRINGE (ML) INJECTION EVERY 12 HOURS SCHEDULED
OUTPATIENT
Start: 2025-07-17

## 2025-07-17 RX ORDER — SODIUM CHLORIDE 9 MG/ML
INJECTION, SOLUTION INTRAVENOUS PRN
OUTPATIENT
Start: 2025-07-17

## 2025-07-17 RX ORDER — SODIUM CHLORIDE 450 MG/100ML
75 INJECTION, SOLUTION INTRAVENOUS CONTINUOUS
OUTPATIENT
Start: 2025-07-17

## 2025-07-17 ASSESSMENT — ENCOUNTER SYMPTOMS
GASTROINTESTINAL NEGATIVE: 1
WHEEZING: 0
RESPIRATORY NEGATIVE: 1
EYES NEGATIVE: 1
CHEST TIGHTNESS: 0
NAUSEA: 0
COUGH: 0
BLOOD IN STOOL: 0
STRIDOR: 0
SHORTNESS OF BREATH: 0

## 2025-07-17 NOTE — PROGRESS NOTES
Subsequent Progress Note    Patient: Erlinda GIBSON Cutlip  YOB: 1950  MRN: [id number]    Chief Complaint: carotid htn carotid cad  Chief Complaint   Patient presents with    Follow-up       CV Data:  2/2017 spect negative ccf  2/2017 PVR negative ccf  1/2019 CUS extensive atherosclerosis R>L  1/19 echo ef 60   4/5/2019 CTA neck - CHERELLE 24%  LICA 34  4/18/2019 CATH LAD Mild RCA 20% EF 55 EDP 5   S/P Complete Thyroidectomy   12/2020 PVR negative  12/2020 CUS CHERELLE 50-69%   1/22 CUS B/L 50%  4/24 CUS mild   10/24 Echo EF 60-65  10/24 SPECT negative    Subjective/HPI: no cp no sob no falls no bleed tolerating only 1/2 tab Livalo. Full tab caused severe myalgia.  LDL not to goal.      1/13/2020 no cp no sob no falls no bleed f/u CT chest mild enlarged R nodule.     12/7/2020 still smokes. No cp no palp no falls no bleeed no sob    1/11/21 TELEHEALTH EVALUATION -- Audio/Visual (During COVID- public health emergency)    No cp no sob no falls no bleed. Eats well still smokes    5/12/21 TELEHEALTH EVALUATION -- Audio/Visual (During COVID-19 public health emergency)    No cp no sob no falls no bleed take smeds doing well    9/16/21 now has bronchitis. No cp no sob no falls occ hemorrhoid bleed. Takes med    1/24/22 doing well no cp no sob no bleed. No falls takes meds. PFT at [facility] - moderate COPD    5/23/22 doing well no cp no so bno falls takes meds    10/10/22 doing well no cp no sob no falls nob leed. Takes meds.     4/10/23 er yesterday due to right facial swelling due to dental issues. CV wise doing well no cp no sob no falls no bleed.     10/9/23 recent Bronchitis. Recent UTI and hematuria. Resolved.  She has appt w [facility] Urologist. No cp     4/11/24 has back ache no cp no sob tripped over a side walk and fell.   Takes well.  Recent UTI and brief Hematuria - resolved.     10/14/24 now more VALE w Exertional chest pressure. No falls no bleed occ LH. No edema    4/10/25 doing fine lots of Reflux had EGD.   Has

## 2025-07-23 ENCOUNTER — OFFICE VISIT (OUTPATIENT)
Age: 75
End: 2025-07-23
Payer: MEDICARE

## 2025-07-23 VITALS
OXYGEN SATURATION: 98 % | HEART RATE: 67 BPM | TEMPERATURE: 97.7 F | WEIGHT: 110 LBS | HEIGHT: 65 IN | DIASTOLIC BLOOD PRESSURE: 64 MMHG | BODY MASS INDEX: 18.33 KG/M2 | SYSTOLIC BLOOD PRESSURE: 130 MMHG

## 2025-07-23 DIAGNOSIS — L03.116 CELLULITIS OF LEFT LOWER EXTREMITY: Primary | ICD-10-CM

## 2025-07-23 DIAGNOSIS — S81.812A SKIN TEAR OF LEFT LOWER LEG WITHOUT COMPLICATION, INITIAL ENCOUNTER: ICD-10-CM

## 2025-07-23 PROCEDURE — 1123F ACP DISCUSS/DSCN MKR DOCD: CPT | Performed by: NURSE PRACTITIONER

## 2025-07-23 PROCEDURE — 3078F DIAST BP <80 MM HG: CPT | Performed by: NURSE PRACTITIONER

## 2025-07-23 PROCEDURE — 99213 OFFICE O/P EST LOW 20 MIN: CPT | Performed by: NURSE PRACTITIONER

## 2025-07-23 PROCEDURE — 1159F MED LIST DOCD IN RCRD: CPT | Performed by: NURSE PRACTITIONER

## 2025-07-23 PROCEDURE — 3075F SYST BP GE 130 - 139MM HG: CPT | Performed by: NURSE PRACTITIONER

## 2025-07-23 RX ORDER — FLUOROURACIL 50 MG/G
1 CREAM TOPICAL DAILY
COMMUNITY
Start: 2025-07-09

## 2025-07-23 RX ORDER — SULFAMETHOXAZOLE AND TRIMETHOPRIM 800; 160 MG/1; MG/1
1 TABLET ORAL 2 TIMES DAILY
Qty: 14 TABLET | Refills: 0 | Status: SHIPPED | OUTPATIENT
Start: 2025-07-23 | End: 2025-07-30

## 2025-07-23 ASSESSMENT — ENCOUNTER SYMPTOMS: COLOR CHANGE: 1

## 2025-07-23 NOTE — PROGRESS NOTES
Subjective:      Patient ID: Erlinda Alvarenga is a 74 y.o. female who presents today for:  Chief Complaint   Patient presents with    Puncture Wound     Patient presents today with open wound on leg. States she was coming down wooden stairs and scrapped it.        HPI  Patient is here with c/o abrasion to the left lower leg.   Says she scapped it about a week ago.   Says it was more like a skin tear.  Says she has been swimming.   Says she was using some wound care spray and aquaphor.   Says it is more red and hard around the area.   Says she is using non stick pad.  Denies any fever or chills.     Past Medical History:   Diagnosis Date    A-fib (HCC)     Anxiety     Asthma     Cancer (HCC) 2018    squamous cell- eye brow - CC    Cancer of the skin, basal cell     Chronic constipation     Colon polyps     COPD (chronic obstructive pulmonary disease) (HCC)     Cyst of nipple     Endometriosis     Fibromyalgia     Goiter     Goiter     Hyperlipidemia     Hypertension     Hypothyroidism     Osteoarthritis     Squamous cell carcinoma in situ (SCCIS) of skin of right lower leg 01/2024    TMJ (dislocation of temporomandibular joint)     TMJ arthritis      Past Surgical History:   Procedure Laterality Date    COLONOSCOPY      DIAGNOSTIC CARDIAC CATH LAB PROCEDURE  04/18/2019    THYROIDECTOMY  2017    Thaddeus aj    THYROIDECTOMY Left 01/06/2015    UPPER GASTROINTESTINAL ENDOSCOPY N/A 4/4/2025    ESOPHAGOGASTRODUODENOSCOPY WITH BIOPSIES performed by [place] Galvez [place] [facility]    VAGINA SURGERY N/A 1/25/2024    REVERSE INTROITOPLASTY VESTIBULECTOMY performed [place] Luna Lester [place] [facility] [place]     Social History     Socioeconomic History    Marital status:      Spouse name: Not on file    Number of children: Not on file    Years of education: Not on file    Highest education level: Not on file   Occupational History    Not on file   Tobacco Use    Smoking status: Some Days     Current packs/day: 1.00

## 2025-07-29 ENCOUNTER — TELEPHONE (OUTPATIENT)
Age: 75
End: 2025-07-29

## 2025-07-29 DIAGNOSIS — I20.89 ANGINA AT REST: ICD-10-CM

## 2025-07-29 DIAGNOSIS — R93.1 ABNORMAL FINDINGS ON DIAGNOSTIC IMAGING OF HEART AND CORONARY CIRCULATION: ICD-10-CM

## 2025-07-29 DIAGNOSIS — I20.9 ANGINA, CLASS III: Primary | ICD-10-CM

## 2025-08-05 ENCOUNTER — HOSPITAL ENCOUNTER (OUTPATIENT)
Age: 75
Setting detail: OUTPATIENT SURGERY
Discharge: HOME | End: 2025-08-05
Attending: INTERNAL MEDICINE | Admitting: INTERNAL MEDICINE
Payer: MEDICARE

## 2025-08-05 VITALS
TEMPERATURE: 97.5 F | BODY MASS INDEX: 18.33 KG/M2 | SYSTOLIC BLOOD PRESSURE: 148 MMHG | OXYGEN SATURATION: 100 % | HEART RATE: 64 BPM | RESPIRATION RATE: 18 BRPM | HEIGHT: 65 IN | WEIGHT: 110 LBS | DIASTOLIC BLOOD PRESSURE: 72 MMHG

## 2025-08-05 DIAGNOSIS — I20.89 ANGINA AT REST: ICD-10-CM

## 2025-08-05 LAB — ECHO BSA: 1.51 M2

## 2025-08-05 PROCEDURE — 6370000000 HC RX 637 (ALT 250 FOR IP): Performed by: INTERNAL MEDICINE

## 2025-08-05 PROCEDURE — 93458 L HRT ARTERY/VENTRICLE ANGIO: CPT | Performed by: INTERNAL MEDICINE

## 2025-08-05 PROCEDURE — 99152 MOD SED SAME PHYS/QHP 5/>YRS: CPT | Performed by: INTERNAL MEDICINE

## 2025-08-05 PROCEDURE — 6360000002 HC RX W HCPCS: Performed by: INTERNAL MEDICINE

## 2025-08-05 PROCEDURE — 7100000010 HC PHASE II RECOVERY - FIRST 15 MIN: Performed by: INTERNAL MEDICINE

## 2025-08-05 PROCEDURE — 99153 MOD SED SAME PHYS/QHP EA: CPT | Performed by: INTERNAL MEDICINE

## 2025-08-05 PROCEDURE — 2580000003 HC RX 258: Performed by: INTERNAL MEDICINE

## 2025-08-05 PROCEDURE — 2709999900 HC NON-CHARGEABLE SUPPLY: Performed by: INTERNAL MEDICINE

## 2025-08-05 PROCEDURE — C1894 INTRO/SHEATH, NON-LASER: HCPCS | Performed by: INTERNAL MEDICINE

## 2025-08-05 PROCEDURE — 6360000004 HC RX CONTRAST MEDICATION: Performed by: INTERNAL MEDICINE

## 2025-08-05 PROCEDURE — C1769 GUIDE WIRE: HCPCS | Performed by: INTERNAL MEDICINE

## 2025-08-05 PROCEDURE — 7100000011 HC PHASE II RECOVERY - ADDTL 15 MIN: Performed by: INTERNAL MEDICINE

## 2025-08-05 RX ORDER — SODIUM CHLORIDE 0.9 % (FLUSH) 0.9 %
5-40 SYRINGE (ML) INJECTION EVERY 12 HOURS SCHEDULED
Status: DISCONTINUED | OUTPATIENT
Start: 2025-08-05 | End: 2025-08-05 | Stop reason: HOSPADM

## 2025-08-05 RX ORDER — ATORVASTATIN CALCIUM 10 MG/1
10 TABLET, FILM COATED ORAL DAILY
Qty: 90 TABLET | Refills: 0 | Status: SHIPPED | OUTPATIENT
Start: 2025-08-05

## 2025-08-05 RX ORDER — SODIUM CHLORIDE 9 MG/ML
INJECTION, SOLUTION INTRAVENOUS PRN
Status: DISCONTINUED | OUTPATIENT
Start: 2025-08-05 | End: 2025-08-05 | Stop reason: HOSPADM

## 2025-08-05 RX ORDER — ASPIRIN 81 MG/1
81 TABLET ORAL DAILY
Qty: 90 TABLET | Refills: 3 | Status: SHIPPED | OUTPATIENT
Start: 2025-08-05

## 2025-08-05 RX ORDER — MIDAZOLAM HYDROCHLORIDE 1 MG/ML
INJECTION, SOLUTION INTRAMUSCULAR; INTRAVENOUS PRN
Status: DISCONTINUED | OUTPATIENT
Start: 2025-08-05 | End: 2025-08-05 | Stop reason: HOSPADM

## 2025-08-05 RX ORDER — SODIUM CHLORIDE 450 MG/100ML
INJECTION, SOLUTION INTRAVENOUS CONTINUOUS
Status: DISCONTINUED | OUTPATIENT
Start: 2025-08-05 | End: 2025-08-05 | Stop reason: HOSPADM

## 2025-08-05 RX ORDER — ACETAMINOPHEN 325 MG/1
650 TABLET ORAL EVERY 4 HOURS PRN
Status: DISCONTINUED | OUTPATIENT
Start: 2025-08-05 | End: 2025-08-05 | Stop reason: HOSPADM

## 2025-08-05 RX ORDER — LOSARTAN POTASSIUM AND HYDROCHLOROTHIAZIDE 12.5; 5 MG/1; MG/1
1 TABLET ORAL DAILY
Qty: 30 TABLET | Refills: 3 | Status: SHIPPED | OUTPATIENT
Start: 2025-08-05

## 2025-08-05 RX ORDER — HYDROCORTISONE SODIUM SUCCINATE 100 MG/2ML
200 INJECTION INTRAMUSCULAR; INTRAVENOUS ONCE
Status: DISCONTINUED | OUTPATIENT
Start: 2025-08-05 | End: 2025-08-05 | Stop reason: HOSPADM

## 2025-08-05 RX ORDER — SODIUM CHLORIDE 0.9 % (FLUSH) 0.9 %
5-40 SYRINGE (ML) INJECTION PRN
Status: DISCONTINUED | OUTPATIENT
Start: 2025-08-05 | End: 2025-08-05 | Stop reason: HOSPADM

## 2025-08-05 RX ORDER — DIPHENHYDRAMINE HYDROCHLORIDE 50 MG/ML
50 INJECTION, SOLUTION INTRAMUSCULAR; INTRAVENOUS ONCE
Status: DISCONTINUED | OUTPATIENT
Start: 2025-08-05 | End: 2025-08-05 | Stop reason: HOSPADM

## 2025-08-05 RX ORDER — LIDOCAINE HYDROCHLORIDE 10 MG/ML
INJECTION, SOLUTION INFILTRATION; PERINEURAL PRN
Status: DISCONTINUED | OUTPATIENT
Start: 2025-08-05 | End: 2025-08-05 | Stop reason: HOSPADM

## 2025-08-05 RX ORDER — FENTANYL CITRATE 50 UG/ML
INJECTION, SOLUTION INTRAMUSCULAR; INTRAVENOUS PRN
Status: DISCONTINUED | OUTPATIENT
Start: 2025-08-05 | End: 2025-08-05 | Stop reason: HOSPADM

## 2025-08-05 RX ORDER — NITROGLYCERIN 0.4 MG/1
0.4 TABLET SUBLINGUAL EVERY 5 MIN PRN
Status: DISCONTINUED | OUTPATIENT
Start: 2025-08-05 | End: 2025-08-05 | Stop reason: HOSPADM

## 2025-08-05 RX ORDER — IOPAMIDOL 612 MG/ML
INJECTION, SOLUTION INTRAVASCULAR PRN
Status: DISCONTINUED | OUTPATIENT
Start: 2025-08-05 | End: 2025-08-05 | Stop reason: HOSPADM

## 2025-08-05 RX ORDER — ONDANSETRON 2 MG/ML
4 INJECTION INTRAMUSCULAR; INTRAVENOUS EVERY 6 HOURS PRN
Status: DISCONTINUED | OUTPATIENT
Start: 2025-08-05 | End: 2025-08-05 | Stop reason: HOSPADM

## 2025-08-05 RX ORDER — ASPIRIN 81 MG/1
81 TABLET, CHEWABLE ORAL ONCE
Status: COMPLETED | OUTPATIENT
Start: 2025-08-05 | End: 2025-08-05

## 2025-08-05 RX ORDER — ISOSORBIDE MONONITRATE 30 MG/1
30 TABLET, EXTENDED RELEASE ORAL DAILY
Qty: 90 TABLET | Refills: 3 | Status: SHIPPED | OUTPATIENT
Start: 2025-08-05

## 2025-08-05 RX ORDER — SODIUM CHLORIDE 450 MG/100ML
75 INJECTION, SOLUTION INTRAVENOUS CONTINUOUS
Status: DISCONTINUED | OUTPATIENT
Start: 2025-08-05 | End: 2025-08-05 | Stop reason: HOSPADM

## 2025-08-05 RX ADMIN — ASPIRIN 81 MG CHEWABLE TABLET 81 MG: 81 TABLET CHEWABLE at 07:51

## 2025-08-08 RX ORDER — BENZONATATE 200 MG/1
200 CAPSULE ORAL 3 TIMES DAILY PRN
Qty: 30 CAPSULE | Refills: 3 | Status: SHIPPED | OUTPATIENT
Start: 2025-08-08

## 2025-08-14 DIAGNOSIS — J44.1 COPD WITH ACUTE EXACERBATION (HCC): ICD-10-CM

## 2025-08-14 RX ORDER — GUAIFENESIN 600 MG/1
600 TABLET, EXTENDED RELEASE ORAL 2 TIMES DAILY
Qty: 60 TABLET | Refills: 5 | Status: SHIPPED | OUTPATIENT
Start: 2025-08-14

## 2025-09-01 DIAGNOSIS — J01.00 ACUTE MAXILLARY SINUSITIS, RECURRENCE NOT SPECIFIED: ICD-10-CM

## 2025-09-02 ENCOUNTER — OFFICE VISIT (OUTPATIENT)
Age: 75
End: 2025-09-02
Payer: MEDICARE

## 2025-09-02 VITALS
WEIGHT: 116 LBS | OXYGEN SATURATION: 96 % | RESPIRATION RATE: 16 BRPM | SYSTOLIC BLOOD PRESSURE: 118 MMHG | DIASTOLIC BLOOD PRESSURE: 60 MMHG | BODY MASS INDEX: 19.3 KG/M2 | HEART RATE: 79 BPM

## 2025-09-02 DIAGNOSIS — I10 ESSENTIAL HYPERTENSION: Primary | ICD-10-CM

## 2025-09-02 PROCEDURE — 1159F MED LIST DOCD IN RCRD: CPT | Performed by: INTERNAL MEDICINE

## 2025-09-02 PROCEDURE — 1123F ACP DISCUSS/DSCN MKR DOCD: CPT | Performed by: INTERNAL MEDICINE

## 2025-09-02 PROCEDURE — 99214 OFFICE O/P EST MOD 30 MIN: CPT | Performed by: INTERNAL MEDICINE

## 2025-09-02 PROCEDURE — 3074F SYST BP LT 130 MM HG: CPT | Performed by: INTERNAL MEDICINE

## 2025-09-02 PROCEDURE — 3078F DIAST BP <80 MM HG: CPT | Performed by: INTERNAL MEDICINE

## 2025-09-02 RX ORDER — CETIRIZINE HYDROCHLORIDE 10 MG/1
10 TABLET ORAL NIGHTLY
Qty: 90 TABLET | Refills: 1 | Status: SHIPPED | OUTPATIENT
Start: 2025-09-02

## 2025-09-02 ASSESSMENT — ENCOUNTER SYMPTOMS
GASTROINTESTINAL NEGATIVE: 1
STRIDOR: 0
NAUSEA: 0
BLOOD IN STOOL: 0
WHEEZING: 0
EYES NEGATIVE: 1
CHEST TIGHTNESS: 0
SHORTNESS OF BREATH: 0
RESPIRATORY NEGATIVE: 1
COUGH: 0

## (undated) DEVICE — GLOVE ORANGE PI 8 1/2   MSG9085

## (undated) DEVICE — TUBING, SUCTION, 1/4" X 10', STRAIGHT: Brand: MEDLINE

## (undated) DEVICE — TUBING IRRIGATION 140/160/180/190 SER GI ENDOSCP SMARTCAP

## (undated) DEVICE — ELECTRODE PT RET AD L9FT HI MOIST COND ADH HYDRGEL CORDED

## (undated) DEVICE — SUTURE PROL SZ 2-0 L36IN NONABSORBABLE BLU SH L26MM 1/2 CIR 8523H

## (undated) DEVICE — LITHOTOMY DRAPE WITH FLUID CONTROL POUCH: Brand: CONVERTORS

## (undated) DEVICE — PACK,BASIC: Brand: MEDLINE

## (undated) DEVICE — TUBING, SUCTION, 9/32" X 12', STRAIGHT: Brand: MEDLINE INDUSTRIES, INC.

## (undated) DEVICE — CONMED SCOPE SAVER BITE BLOCK, 20X27 MM: Brand: SCOPE SAVER

## (undated) DEVICE — SUTURE VCRL SZ 2-0 L27IN ABSRB UD L26MM SH 1/2 CIR J417H

## (undated) DEVICE — SYRINGE IRRIG 60ML SFT PLIABLE BLB EZ TO GRP 1 HND USE W/

## (undated) DEVICE — SINGLE PORT MANIFOLD: Brand: NEPTUNE 2

## (undated) DEVICE — GOWN,AURORA,NONRNF,XL,30/CS: Brand: MEDLINE

## (undated) DEVICE — NEPTUNE E-SEP SMOKE EVACUATION PENCIL, COATED, 70MM BLADE, PUSH BUTTON SWITCH: Brand: NEPTUNE E-SEP

## (undated) DEVICE — HYPODERMIC SAFETY NEEDLE: Brand: MAGELLAN

## (undated) DEVICE — TTL1LYR 16FR10ML 100%SIL TMPST TR: Brand: MEDLINE

## (undated) DEVICE — GAUZE,SPONGE,4"X4",16PLY,XRAY,STRL,LF: Brand: MEDLINE

## (undated) DEVICE — SKIN PREP TRAY 4 COMPARTM TRAY: Brand: MEDLINE INDUSTRIES, INC.

## (undated) DEVICE — YANKAUER,SMOOTH HANDLE,HIGH CAPACITY: Brand: MEDLINE INDUSTRIES, INC.

## (undated) DEVICE — SYRINGE MED 10ML TRNSLUC BRL PLUNG BLK MRK POLYPR CTRL

## (undated) DEVICE — GOWN,AURORA,NONREINFORCED,LARGE: Brand: MEDLINE

## (undated) DEVICE — BLADE,CARBON-STEEL,10,STRL,DISPOSABLE,TB: Brand: MEDLINE

## (undated) DEVICE — COVER LT HNDL BLU PLAS

## (undated) DEVICE — BRUSH ENDO CLN L90.5IN SHTH DIA1.7MM BRIST DIA5-7MM 2-6MM

## (undated) DEVICE — SPONGE,LAP,18"X18",DLX,XR,ST,5/PK,40/PK: Brand: MEDLINE

## (undated) DEVICE — FORCEPS BX L240CM JAW DIA2.8MM L CAP W/ NDL MIC MESH TOOTH

## (undated) DEVICE — GAUZE PK VAGINALXRAY DTECT 2INX15FT

## (undated) DEVICE — TOWEL,OR,DSP,ST,BLUE,STD,4/PK,20PK/CS: Brand: MEDLINE

## (undated) DEVICE — TUBE SET 96 MM 64 MM H2O PERISTALTIC STD AUX CHANNEL

## (undated) DEVICE — ENDO CARRY-ON PROCEDURE KIT: Brand: ENDO CARRY-ON PROCEDURE KIT

## (undated) DEVICE — COUNTER NDL 40 COUNT HLD 70 FOAM BLK ADH W/ MAG

## (undated) DEVICE — SUTURE VCRL SZ 0 L27IN ABSRB UD SH L26MM 1/2 CIR TAPERPOINT J418H

## (undated) DEVICE — KIT GYN W/ 1 L SNOWMAN DISP RETRCT RNG 5MM SHRP HK STAY TWO

## (undated) DEVICE — LABEL MED MINI W/ MARKER

## (undated) DEVICE — SET,IRRIGATION,CYSTO/TUR: Brand: MEDLINE

## (undated) DEVICE — LINER INCONT W7XL14IN PEACH POLYMER FLUF ADH WT CNTOUR DLX